# Patient Record
Sex: MALE | Race: BLACK OR AFRICAN AMERICAN | NOT HISPANIC OR LATINO | Employment: FULL TIME | URBAN - METROPOLITAN AREA
[De-identification: names, ages, dates, MRNs, and addresses within clinical notes are randomized per-mention and may not be internally consistent; named-entity substitution may affect disease eponyms.]

---

## 2017-05-10 ENCOUNTER — OFFICE VISIT (OUTPATIENT)
Dept: FAMILY MEDICINE CLINIC | Facility: CLINIC | Age: 32
End: 2017-05-10

## 2017-05-10 VITALS
WEIGHT: 315 LBS | HEIGHT: 72 IN | BODY MASS INDEX: 42.66 KG/M2 | TEMPERATURE: 98.4 F | SYSTOLIC BLOOD PRESSURE: 130 MMHG | OXYGEN SATURATION: 98 % | DIASTOLIC BLOOD PRESSURE: 64 MMHG | HEART RATE: 73 BPM

## 2017-05-10 DIAGNOSIS — E11.9 TYPE 2 DIABETES MELLITUS WITHOUT COMPLICATION, WITHOUT LONG-TERM CURRENT USE OF INSULIN (HCC): ICD-10-CM

## 2017-05-10 DIAGNOSIS — R10.13 EPIGASTRIC ABDOMINAL PAIN: ICD-10-CM

## 2017-05-10 DIAGNOSIS — L98.9 SKIN LESION: ICD-10-CM

## 2017-05-10 DIAGNOSIS — R10.11 RUQ ABDOMINAL PAIN: Primary | ICD-10-CM

## 2017-05-10 DIAGNOSIS — E78.5 DYSLIPIDEMIA: ICD-10-CM

## 2017-05-10 DIAGNOSIS — I10 BENIGN ESSENTIAL HYPERTENSION: ICD-10-CM

## 2017-05-10 PROCEDURE — 99214 OFFICE O/P EST MOD 30 MIN: CPT | Performed by: FAMILY MEDICINE

## 2017-05-10 RX ORDER — PANTOPRAZOLE SODIUM 40 MG/1
40 TABLET, DELAYED RELEASE ORAL DAILY
Qty: 90 TABLET | Refills: 3 | Status: SHIPPED | OUTPATIENT
Start: 2017-05-10 | End: 2017-11-10

## 2017-05-10 RX ORDER — HYDROCODONE BITARTRATE AND ACETAMINOPHEN 5; 325 MG/1; MG/1
1 TABLET ORAL EVERY 8 HOURS PRN
Qty: 24 TABLET | Refills: 0 | Status: SHIPPED | OUTPATIENT
Start: 2017-05-10 | End: 2017-07-17 | Stop reason: HOSPADM

## 2017-05-12 ENCOUNTER — APPOINTMENT (OUTPATIENT)
Dept: ULTRASOUND IMAGING | Facility: HOSPITAL | Age: 32
End: 2017-05-12
Attending: EMERGENCY MEDICINE

## 2017-05-12 ENCOUNTER — TELEPHONE (OUTPATIENT)
Dept: FAMILY MEDICINE CLINIC | Facility: CLINIC | Age: 32
End: 2017-05-12

## 2017-05-12 ENCOUNTER — APPOINTMENT (OUTPATIENT)
Dept: ULTRASOUND IMAGING | Facility: HOSPITAL | Age: 32
End: 2017-05-12

## 2017-05-12 ENCOUNTER — HOSPITAL ENCOUNTER (EMERGENCY)
Facility: HOSPITAL | Age: 32
Discharge: HOME | End: 2017-05-12
Attending: EMERGENCY MEDICINE | Admitting: EMERGENCY MEDICINE

## 2017-05-12 VITALS
OXYGEN SATURATION: 100 % | BODY MASS INDEX: 42.66 KG/M2 | RESPIRATION RATE: 20 BRPM | DIASTOLIC BLOOD PRESSURE: 67 MMHG | TEMPERATURE: 97 F | SYSTOLIC BLOOD PRESSURE: 133 MMHG | WEIGHT: 315 LBS | HEIGHT: 72 IN | HEART RATE: 86 BPM

## 2017-05-12 DIAGNOSIS — K29.00 ACUTE GASTRITIS WITHOUT HEMORRHAGE, UNSPECIFIED GASTRITIS TYPE: Primary | ICD-10-CM

## 2017-05-12 LAB
ALBUMIN SERPL-MCNC: 4.2 G/DL (ref 3.5–5.2)
ALBUMIN/GLOB SERPL: 1.4 G/DL
ALP SERPL-CCNC: 75 U/L (ref 39–117)
ALT SERPL W P-5'-P-CCNC: 31 U/L (ref 1–41)
ANION GAP SERPL CALCULATED.3IONS-SCNC: 13.4 MMOL/L
AST SERPL-CCNC: 25 U/L (ref 1–40)
BASOPHILS # BLD AUTO: 0.04 10*3/MM3 (ref 0–0.2)
BASOPHILS NFR BLD AUTO: 0.4 % (ref 0–1.5)
BILIRUB SERPL-MCNC: 0.4 MG/DL (ref 0.1–1.2)
BILIRUB UR QL STRIP: NEGATIVE
BUN BLD-MCNC: 11 MG/DL (ref 6–20)
BUN/CREAT SERPL: 9.6 (ref 7–25)
CALCIUM SPEC-SCNC: 9.3 MG/DL (ref 8.6–10.5)
CHLORIDE SERPL-SCNC: 99 MMOL/L (ref 98–107)
CLARITY UR: CLEAR
CO2 SERPL-SCNC: 25.6 MMOL/L (ref 22–29)
COLOR UR: YELLOW
CREAT BLD-MCNC: 1.14 MG/DL (ref 0.76–1.27)
DEPRECATED RDW RBC AUTO: 40.2 FL (ref 37–54)
EOSINOPHIL # BLD AUTO: 0.19 10*3/MM3 (ref 0–0.7)
EOSINOPHIL NFR BLD AUTO: 1.8 % (ref 0.3–6.2)
ERYTHROCYTE [DISTWIDTH] IN BLOOD BY AUTOMATED COUNT: 14.2 % (ref 11.5–14.5)
GFR SERPL CREATININE-BSD FRML MDRD: 91 ML/MIN/1.73
GLOBULIN UR ELPH-MCNC: 3.1 GM/DL
GLUCOSE BLD-MCNC: 106 MG/DL (ref 65–99)
GLUCOSE UR STRIP-MCNC: NEGATIVE MG/DL
HCT VFR BLD AUTO: 41.7 % (ref 40.4–52.2)
HGB BLD-MCNC: 13.7 G/DL (ref 13.7–17.6)
HGB UR QL STRIP.AUTO: NEGATIVE
HOLD SPECIMEN: NORMAL
HOLD SPECIMEN: NORMAL
IMM GRANULOCYTES # BLD: 0.02 10*3/MM3 (ref 0–0.03)
IMM GRANULOCYTES NFR BLD: 0.2 % (ref 0–0.5)
KETONES UR QL STRIP: NEGATIVE
LEUKOCYTE ESTERASE UR QL STRIP.AUTO: NEGATIVE
LIPASE SERPL-CCNC: 40 U/L (ref 13–60)
LYMPHOCYTES # BLD AUTO: 2.55 10*3/MM3 (ref 0.9–4.8)
LYMPHOCYTES NFR BLD AUTO: 24.6 % (ref 19.6–45.3)
MCH RBC QN AUTO: 25.8 PG (ref 27–32.7)
MCHC RBC AUTO-ENTMCNC: 32.9 G/DL (ref 32.6–36.4)
MCV RBC AUTO: 78.7 FL (ref 79.8–96.2)
MONOCYTES # BLD AUTO: 0.62 10*3/MM3 (ref 0.2–1.2)
MONOCYTES NFR BLD AUTO: 6 % (ref 5–12)
NEUTROPHILS # BLD AUTO: 6.95 10*3/MM3 (ref 1.9–8.1)
NEUTROPHILS NFR BLD AUTO: 67 % (ref 42.7–76)
NITRITE UR QL STRIP: NEGATIVE
PH UR STRIP.AUTO: 5.5 [PH] (ref 5–8)
PLATELET # BLD AUTO: 287 10*3/MM3 (ref 140–500)
PMV BLD AUTO: 9.8 FL (ref 6–12)
POTASSIUM BLD-SCNC: 3.7 MMOL/L (ref 3.5–5.2)
PROT SERPL-MCNC: 7.3 G/DL (ref 6–8.5)
PROT UR QL STRIP: NEGATIVE
RBC # BLD AUTO: 5.3 10*6/MM3 (ref 4.6–6)
SODIUM BLD-SCNC: 138 MMOL/L (ref 136–145)
SP GR UR STRIP: 1.03 (ref 1–1.03)
UROBILINOGEN UR QL STRIP: NORMAL
WBC NRBC COR # BLD: 10.37 10*3/MM3 (ref 4.5–10.7)
WHOLE BLOOD HOLD SPECIMEN: NORMAL
WHOLE BLOOD HOLD SPECIMEN: NORMAL

## 2017-05-12 PROCEDURE — 76705 ECHO EXAM OF ABDOMEN: CPT

## 2017-05-12 PROCEDURE — 81003 URINALYSIS AUTO W/O SCOPE: CPT | Performed by: EMERGENCY MEDICINE

## 2017-05-12 PROCEDURE — 99283 EMERGENCY DEPT VISIT LOW MDM: CPT

## 2017-05-12 PROCEDURE — 96374 THER/PROPH/DIAG INJ IV PUSH: CPT

## 2017-05-12 PROCEDURE — 85025 COMPLETE CBC W/AUTO DIFF WBC: CPT | Performed by: EMERGENCY MEDICINE

## 2017-05-12 PROCEDURE — 80053 COMPREHEN METABOLIC PANEL: CPT | Performed by: EMERGENCY MEDICINE

## 2017-05-12 PROCEDURE — 83690 ASSAY OF LIPASE: CPT | Performed by: EMERGENCY MEDICINE

## 2017-05-12 RX ORDER — SODIUM CHLORIDE 0.9 % (FLUSH) 0.9 %
10 SYRINGE (ML) INJECTION AS NEEDED
Status: DISCONTINUED | OUTPATIENT
Start: 2017-05-12 | End: 2017-05-12 | Stop reason: HOSPADM

## 2017-05-12 RX ORDER — PANTOPRAZOLE SODIUM 40 MG/10ML
80 INJECTION, POWDER, LYOPHILIZED, FOR SOLUTION INTRAVENOUS ONCE
Status: COMPLETED | OUTPATIENT
Start: 2017-05-12 | End: 2017-05-12

## 2017-05-12 RX ORDER — SUCRALFATE ORAL 1 G/10ML
1 SUSPENSION ORAL 4 TIMES DAILY
Qty: 420 ML | Refills: 0 | Status: SHIPPED | OUTPATIENT
Start: 2017-05-12 | End: 2017-07-17 | Stop reason: HOSPADM

## 2017-05-12 RX ORDER — LANSOPRAZOLE 15 MG/1
15 CAPSULE, DELAYED RELEASE ORAL 2 TIMES DAILY
Qty: 30 CAPSULE | Refills: 0 | Status: SHIPPED | OUTPATIENT
Start: 2017-05-12 | End: 2017-07-17 | Stop reason: HOSPADM

## 2017-05-12 RX ADMIN — PANTOPRAZOLE SODIUM 80 MG: 40 INJECTION, POWDER, FOR SOLUTION INTRAVENOUS at 07:34

## 2017-05-15 DIAGNOSIS — R10.13 EPIGASTRIC ABDOMINAL PAIN: ICD-10-CM

## 2017-05-15 DIAGNOSIS — R10.11 RUQ ABDOMINAL PAIN: Primary | ICD-10-CM

## 2017-05-16 ENCOUNTER — TELEPHONE (OUTPATIENT)
Dept: SOCIAL WORK | Facility: HOSPITAL | Age: 32
End: 2017-05-16

## 2017-07-17 ENCOUNTER — OFFICE VISIT (OUTPATIENT)
Dept: FAMILY MEDICINE CLINIC | Facility: CLINIC | Age: 32
End: 2017-07-17

## 2017-07-17 VITALS
WEIGHT: 315 LBS | OXYGEN SATURATION: 97 % | BODY MASS INDEX: 42.66 KG/M2 | HEIGHT: 72 IN | TEMPERATURE: 98 F | HEART RATE: 87 BPM | SYSTOLIC BLOOD PRESSURE: 118 MMHG | DIASTOLIC BLOOD PRESSURE: 60 MMHG

## 2017-07-17 DIAGNOSIS — J01.10 ACUTE NON-RECURRENT FRONTAL SINUSITIS: Primary | ICD-10-CM

## 2017-07-17 DIAGNOSIS — E78.5 DYSLIPIDEMIA: ICD-10-CM

## 2017-07-17 DIAGNOSIS — R73.03 PREDIABETES: ICD-10-CM

## 2017-07-17 DIAGNOSIS — I10 BENIGN ESSENTIAL HYPERTENSION: ICD-10-CM

## 2017-07-17 PROCEDURE — 99213 OFFICE O/P EST LOW 20 MIN: CPT | Performed by: FAMILY MEDICINE

## 2017-07-17 RX ORDER — AMOXICILLIN 875 MG/1
875 TABLET, COATED ORAL 2 TIMES DAILY
Qty: 20 TABLET | Refills: 0 | Status: SHIPPED | OUTPATIENT
Start: 2017-07-17 | End: 2017-11-10

## 2017-07-17 NOTE — PROGRESS NOTES
Subjective   Josiah Loomis is a 32 y.o. male. Presents today for   Chief Complaint   Patient presents with   • URI   • Cough     1 month       Cough   This is a new problem. Episode onset: 1 month. The problem has been unchanged. Episode frequency: intermittently. The cough is productive of sputum. Associated symptoms include chills, a fever (last week), headaches (after coughing sinus area), shortness of breath and wheezing. Pertinent negatives include no ear congestion, ear pain, heartburn, hemoptysis, nasal congestion, postnasal drip, rhinorrhea or sore throat. Nothing aggravates the symptoms. He has tried OTC cough suppressant for the symptoms. The treatment provided no relief. There is no history of environmental allergies.     Also right foot numb;  No pain;  No back pain;  Had predm 2 years ago with a1c 6.3%;  Has polyuria and polydipsia, though reports drinking 1 gallon water a day intentionally to lose weight.  Has lost several pounds.  Patient states going to gym, eating healthy;  Reports over all feels best has ever felt in general.  Hx of htn, bp okay w/o medications after weight loss.    Review of Systems   Constitutional: Positive for chills and fever (last week).   HENT: Positive for congestion and sinus pressure. Negative for ear pain, postnasal drip, rhinorrhea and sore throat.    Respiratory: Positive for cough, shortness of breath and wheezing. Negative for hemoptysis.    Gastrointestinal: Negative for abdominal pain, diarrhea, heartburn, nausea and vomiting.   Allergic/Immunologic: Negative for environmental allergies.   Neurological: Positive for headaches (after coughing sinus area).       The following portions of the patient's history were reviewed and updated as appropriate: allergies, current medications, past medical history and problem list.    Patient Active Problem List   Diagnosis   • Sprain of acromioclavicular ligament   • Atopic dermatitis   • Benign essential hypertension   •  "Dyslipidemia   • Abdominal hernia   • Prediabetes       No Known Allergies    Current Outpatient Prescriptions on File Prior to Visit   Medication Sig Dispense Refill   • pantoprazole (PROTONIX) 40 MG EC tablet Take 1 tablet by mouth Daily. 90 tablet 3   • [DISCONTINUED] atorvastatin (LIPITOR) 80 MG tablet Take 1 tablet by mouth daily. 30 tablet 11   • [DISCONTINUED] HYDROcodone-acetaminophen (NORCO) 5-325 MG per tablet Take 1 tablet by mouth Every 8 (Eight) Hours As Needed for Severe Pain (7-10). 24 tablet 0   • [DISCONTINUED] lansoprazole (PREVACID) 15 MG capsule Take 1 capsule by mouth 2 (Two) Times a Day. 30 capsule 0   • [DISCONTINUED] lisinopril (PRINIVIL,ZESTRIL) 20 MG tablet Take 1 tablet by mouth daily. 30 tablet 11   • [DISCONTINUED] metFORMIN (GLUCOPHAGE) 500 MG tablet Take 1 tablet by mouth daily with breakfast. 30 tablet 11   • [DISCONTINUED] sucralfate (CARAFATE) 1 GM/10ML suspension Take 10 mL by mouth 4 (Four) Times a Day. 420 mL 0     No current facility-administered medications on file prior to visit.        Objective   Vitals:    07/17/17 1317   BP: 118/60   BP Location: Left arm   Patient Position: Sitting   Cuff Size: Large Adult   Pulse: 87   Temp: 98 °F (36.7 °C)   TempSrc: Oral   SpO2: 97%   Weight: (!) 366 lb (166 kg)   Height: 72\" (182.9 cm)       Physical Exam   Constitutional: He appears well-developed and well-nourished.   HENT:   Head: Normocephalic and atraumatic.   Neck: Neck supple. No JVD present. No thyromegaly present.   Cardiovascular: Normal rate, regular rhythm and normal heart sounds.  Exam reveals no gallop and no friction rub.    No murmur heard.  Pulmonary/Chest: Effort normal and breath sounds normal. No respiratory distress. He has no wheezes. He has no rales.   Abdominal: Soft. Bowel sounds are normal. He exhibits no distension. There is no tenderness. There is no rebound and no guarding.   Musculoskeletal: He exhibits no edema.   Neurological: He is alert.   Skin: Skin " is warm and dry.   Psychiatric: He has a normal mood and affect. His behavior is normal.   Nursing note and vitals reviewed.      Assessment/Plan   Josiah was seen today for uri and cough.    Diagnoses and all orders for this visit:    Acute non-recurrent frontal sinusitis  -     amoxicillin (AMOXIL) 875 MG tablet; Take 1 tablet by mouth 2 (Two) Times a Day.    Prediabetes  -     Comprehensive Metabolic Panel  -     Hemoglobin A1c  -     Lipid Panel    Dyslipidemia  -     Comprehensive Metabolic Panel  -     Hemoglobin A1c  -     Lipid Panel    Benign essential hypertension  -     Comprehensive Metabolic Panel    -patient doing better overall, but overdue for labs;  Last ordered not done.  Right numb heel, no pain and non-bothersome, will monitor and screen for dm2.         -Follow up: 6 months       Current Outpatient Prescriptions:   •  pantoprazole (PROTONIX) 40 MG EC tablet, Take 1 tablet by mouth Daily., Disp: 90 tablet, Rfl: 3  •  amoxicillin (AMOXIL) 875 MG tablet, Take 1 tablet by mouth 2 (Two) Times a Day., Disp: 20 tablet, Rfl: 0

## 2017-07-18 LAB
ALBUMIN SERPL-MCNC: 3.8 G/DL (ref 3.5–5.5)
ALBUMIN/GLOB SERPL: 1.2 {RATIO} (ref 1.2–2.2)
ALP SERPL-CCNC: 148 IU/L (ref 39–117)
ALT SERPL-CCNC: 54 IU/L (ref 0–44)
AST SERPL-CCNC: 45 IU/L (ref 0–40)
BILIRUB SERPL-MCNC: 0.4 MG/DL (ref 0–1.2)
BUN SERPL-MCNC: 9 MG/DL (ref 6–20)
BUN/CREAT SERPL: 10 (ref 9–20)
CALCIUM SERPL-MCNC: 9.5 MG/DL (ref 8.7–10.2)
CHLORIDE SERPL-SCNC: 98 MMOL/L (ref 96–106)
CHOLEST SERPL-MCNC: 235 MG/DL (ref 100–199)
CO2 SERPL-SCNC: 25 MMOL/L (ref 18–29)
CREAT SERPL-MCNC: 0.92 MG/DL (ref 0.76–1.27)
GLOBULIN SER CALC-MCNC: 3.2 G/DL (ref 1.5–4.5)
GLUCOSE SERPL-MCNC: 133 MG/DL (ref 65–99)
HBA1C MFR BLD: 6.3 % (ref 4.8–5.6)
HDLC SERPL-MCNC: 45 MG/DL
LDLC SERPL CALC-MCNC: 166 MG/DL (ref 0–99)
POTASSIUM SERPL-SCNC: 3.9 MMOL/L (ref 3.5–5.2)
PROT SERPL-MCNC: 7 G/DL (ref 6–8.5)
SODIUM SERPL-SCNC: 139 MMOL/L (ref 134–144)
TRIGL SERPL-MCNC: 121 MG/DL (ref 0–149)
VLDLC SERPL CALC-MCNC: 24 MG/DL (ref 5–40)

## 2017-07-19 PROCEDURE — 85025 COMPLETE CBC W/AUTO DIFF WBC: CPT | Performed by: PHYSICIAN ASSISTANT

## 2017-07-19 PROCEDURE — 99283 EMERGENCY DEPT VISIT LOW MDM: CPT

## 2017-07-19 PROCEDURE — 80053 COMPREHEN METABOLIC PANEL: CPT | Performed by: PHYSICIAN ASSISTANT

## 2017-07-19 PROCEDURE — 36415 COLL VENOUS BLD VENIPUNCTURE: CPT

## 2017-07-19 NOTE — PROGRESS NOTES
Call and mail copy of results to patient.  Liver enzymes elevated mildly;  ORder RUQ US (GB) and acute hepatitis profile (dx elevated liver enzymes).  Blood sugar borderline, continue work on diet, exericse and weight loss.

## 2017-07-20 ENCOUNTER — HOSPITAL ENCOUNTER (EMERGENCY)
Facility: HOSPITAL | Age: 32
Discharge: HOME | End: 2017-07-20
Attending: EMERGENCY MEDICINE | Admitting: EMERGENCY MEDICINE

## 2017-07-20 VITALS
BODY MASS INDEX: 42.66 KG/M2 | HEART RATE: 85 BPM | WEIGHT: 315 LBS | RESPIRATION RATE: 14 BRPM | OXYGEN SATURATION: 100 % | TEMPERATURE: 99.6 F | HEIGHT: 72 IN | SYSTOLIC BLOOD PRESSURE: 132 MMHG | DIASTOLIC BLOOD PRESSURE: 63 MMHG

## 2017-07-20 DIAGNOSIS — R20.2 PARESTHESIA: ICD-10-CM

## 2017-07-20 DIAGNOSIS — M76.62 ACHILLES TENDINITIS OF LEFT LOWER EXTREMITY: Primary | ICD-10-CM

## 2017-07-20 LAB
ALBUMIN SERPL-MCNC: 3.9 G/DL (ref 3.5–5.2)
ALBUMIN/GLOB SERPL: 0.9 G/DL
ALP SERPL-CCNC: 141 U/L (ref 39–117)
ALT SERPL W P-5'-P-CCNC: 56 U/L (ref 1–41)
ANION GAP SERPL CALCULATED.3IONS-SCNC: 16.2 MMOL/L
AST SERPL-CCNC: 46 U/L (ref 1–40)
BASOPHILS # BLD AUTO: 0.04 10*3/MM3 (ref 0–0.2)
BASOPHILS NFR BLD AUTO: 0.4 % (ref 0–1.5)
BILIRUB SERPL-MCNC: 0.5 MG/DL (ref 0.1–1.2)
BUN BLD-MCNC: 12 MG/DL (ref 6–20)
BUN/CREAT SERPL: 9.6 (ref 7–25)
CALCIUM SPEC-SCNC: 9.8 MG/DL (ref 8.6–10.5)
CHLORIDE SERPL-SCNC: 98 MMOL/L (ref 98–107)
CO2 SERPL-SCNC: 23.8 MMOL/L (ref 22–29)
CREAT BLD-MCNC: 1.25 MG/DL (ref 0.76–1.27)
DEPRECATED RDW RBC AUTO: 40.1 FL (ref 37–54)
EOSINOPHIL # BLD AUTO: 0.31 10*3/MM3 (ref 0–0.7)
EOSINOPHIL NFR BLD AUTO: 3.2 % (ref 0.3–6.2)
ERYTHROCYTE [DISTWIDTH] IN BLOOD BY AUTOMATED COUNT: 14.2 % (ref 11.5–14.5)
GFR SERPL CREATININE-BSD FRML MDRD: 82 ML/MIN/1.73
GLOBULIN UR ELPH-MCNC: 4.2 GM/DL
GLUCOSE BLD-MCNC: 101 MG/DL (ref 65–99)
HCT VFR BLD AUTO: 41 % (ref 40.4–52.2)
HGB BLD-MCNC: 13.4 G/DL (ref 13.7–17.6)
IMM GRANULOCYTES # BLD: 0.03 10*3/MM3 (ref 0–0.03)
IMM GRANULOCYTES NFR BLD: 0.3 % (ref 0–0.5)
LYMPHOCYTES # BLD AUTO: 1.58 10*3/MM3 (ref 0.9–4.8)
LYMPHOCYTES NFR BLD AUTO: 16.3 % (ref 19.6–45.3)
MCH RBC QN AUTO: 25.7 PG (ref 27–32.7)
MCHC RBC AUTO-ENTMCNC: 32.7 G/DL (ref 32.6–36.4)
MCV RBC AUTO: 78.5 FL (ref 79.8–96.2)
MONOCYTES # BLD AUTO: 0.73 10*3/MM3 (ref 0.2–1.2)
MONOCYTES NFR BLD AUTO: 7.5 % (ref 5–12)
NEUTROPHILS # BLD AUTO: 7 10*3/MM3 (ref 1.9–8.1)
NEUTROPHILS NFR BLD AUTO: 72.3 % (ref 42.7–76)
PLATELET # BLD AUTO: 338 10*3/MM3 (ref 140–500)
PMV BLD AUTO: 9.7 FL (ref 6–12)
POTASSIUM BLD-SCNC: 3.8 MMOL/L (ref 3.5–5.2)
PROT SERPL-MCNC: 8.1 G/DL (ref 6–8.5)
RBC # BLD AUTO: 5.22 10*6/MM3 (ref 4.6–6)
SODIUM BLD-SCNC: 138 MMOL/L (ref 136–145)
WBC NRBC COR # BLD: 9.69 10*3/MM3 (ref 4.5–10.7)

## 2017-07-20 RX ORDER — NAPROXEN 500 MG/1
500 TABLET ORAL 2 TIMES DAILY PRN
Qty: 20 TABLET | Refills: 0 | Status: SHIPPED | OUTPATIENT
Start: 2017-07-20 | End: 2017-09-18

## 2017-07-20 NOTE — ED TRIAGE NOTES
"Pt c/o numbness in right heel that began this past Saturday but this morning when he went to get up from tying his shoe, he felt a severe \"pulling pain\" which is making it hard to walk. He also c/o pain in left achilles area that \"feels like its pulling and is tender to touch.   "

## 2017-07-20 NOTE — ED PROVIDER NOTES
" EMERGENCY DEPARTMENT ENCOUNTER    CHIEF COMPLAINT  Chief Complaint: left foot pain  History given by: patient  History limited by: nothing   Room Number: 16/16  PMD: Kodi Rosario DO      HPI:  Pt is a 32 y.o. male who presents complaining of left foot pain onset last night. Pt also complains of right foot numbness, but he denies right foot pain. He states he is on his feet a lot at work. Earlier, pt felt something \"pulling\" in his foot while bending over. He denies recent shoe change.    Onset: last night   Timing: constant   Location: feet   Radiation: does not radiate   Quality: new   Intensity/Severity: mild  Progression: unchanged   Associated Symptoms: right foot numbness   Aggravating Factors: none specified   Alleviating Factors: none specified   Previous Episodes: none specified   Treatment before arrival: none specified     PAST MEDICAL HISTORY  Active Ambulatory Problems     Diagnosis Date Noted   • Sprain of acromioclavicular ligament 05/26/2016   • Atopic dermatitis 05/26/2016   • Benign essential hypertension 05/26/2016   • Dyslipidemia 05/26/2016   • Abdominal hernia 05/26/2016   • Prediabetes 05/26/2016     Resolved Ambulatory Problems     Diagnosis Date Noted   • No Resolved Ambulatory Problems     Past Medical History:   Diagnosis Date   • GERD (gastroesophageal reflux disease)    • Hyperlipidemia    • Hypertension        PAST SURGICAL HISTORY  History reviewed. No pertinent surgical history.    FAMILY HISTORY  History reviewed. No pertinent family history.    SOCIAL HISTORY  Social History     Social History   • Marital status:      Spouse name: N/A   • Number of children: N/A   • Years of education: N/A     Occupational History   • Not on file.     Social History Main Topics   • Smoking status: Never Smoker   • Smokeless tobacco: Never Used   • Alcohol use Yes      Comment: occ   • Drug use: No   • Sexual activity: Defer     Other Topics Concern   • Not on file     Social History " Narrative       ALLERGIES  Review of patient's allergies indicates no known allergies.    REVIEW OF SYSTEMS  Review of Systems   Constitutional: Negative for chills and fever.   HENT: Negative for sore throat.    Respiratory: Negative for shortness of breath.    Cardiovascular: Negative for chest pain.   Gastrointestinal: Negative for nausea and vomiting.   Genitourinary: Negative for dysuria.   Musculoskeletal: Negative for back pain.        Left foot pain   Skin: Negative for rash.   Neurological: Positive for numbness (right foot). Negative for dizziness.   Psychiatric/Behavioral: The patient is not nervous/anxious.        PHYSICAL EXAM  ED Triage Vitals   Temp Heart Rate Resp BP SpO2   07/19/17 2233 07/19/17 2233 07/19/17 2233 07/19/17 2253 07/19/17 2233   99.6 °F (37.6 °C) 127 16 176/90 98 %      Temp src Heart Rate Source Patient Position BP Location FiO2 (%)   07/19/17 2233 07/19/17 2233 07/19/17 2253 07/19/17 2253 --   Tympanic Monitor Sitting Right arm        Physical Exam   Constitutional: He is oriented to person, place, and time and well-developed, well-nourished, and in no distress.   HENT:   Head: Normocephalic and atraumatic.   Eyes: EOM are normal. Pupils are equal, round, and reactive to light.   Neck: Normal range of motion. Neck supple.   Cardiovascular: Normal rate, regular rhythm and normal heart sounds.    Pulses:       Dorsalis pedis pulses are 2+ on the right side, and 2+ on the left side.   Pulmonary/Chest: Effort normal and breath sounds normal. No respiratory distress.   Abdominal: Soft. There is no tenderness. There is no rebound and no guarding.   Musculoskeletal: Normal range of motion. He exhibits no edema.        Left ankle: Achilles tendon exhibits pain.        Right foot: There is no tenderness and no swelling.        Left foot: There is normal range of motion.   Neurological: He is alert and oriented to person, place, and time. He has normal sensation and normal strength.   Skin:  Skin is warm and dry.   Psychiatric: Mood and affect normal.   Nursing note and vitals reviewed.      LAB RESULTS  Lab Results (last 24 hours)     Procedure Component Value Units Date/Time    CBC & Differential [[id number]] Collected:  07/19/17 2349    Specimen:  Blood Updated:  07/20/17 0027    Narrative:       The following orders were created for panel order CBC & Differential.  Procedure                               Abnormality         Status                     ---------                               -----------         ------                     CBC Auto Differential[[id number]]         Abnormal            Final result                 Please view results for these tests on the individual orders.    Comprehensive Metabolic Panel [[id number]]  (Abnormal) Collected:  07/19/17 2349    Specimen:  Blood Updated:  07/20/17 0057     Glucose 101 (H) mg/dL      BUN 12 mg/dL      Creatinine 1.25 mg/dL      Sodium 138 mmol/L      Potassium 3.8 mmol/L      Chloride 98 mmol/L      CO2 23.8 mmol/L      Calcium 9.8 mg/dL      Total Protein 8.1 g/dL      Albumin 3.90 g/dL      ALT (SGPT) 56 (H) U/L      AST (SGOT) 46 (H) U/L      Alkaline Phosphatase 141 (H) U/L      Total Bilirubin 0.5 mg/dL      eGFR   Amer 82 mL/min/1.73      Globulin 4.2 gm/dL      A/G Ratio 0.9 g/dL      BUN/Creatinine Ratio 9.6     Anion Gap 16.2 mmol/L     CBC Auto Differential [[id number]]  (Abnormal) Collected:  07/19/17 2349    Specimen:  Blood Updated:  07/20/17 0027     WBC 9.69 10*3/mm3      RBC 5.22 10*6/mm3      Hemoglobin 13.4 (L) g/dL      Hematocrit 41.0 %      MCV 78.5 (L) fL      MCH 25.7 (L) pg      MCHC 32.7 g/dL      RDW 14.2 %      RDW-SD 40.1 fl      MPV 9.7 fL      Platelets 338 10*3/mm3      Neutrophil % 72.3 %      Lymphocyte % 16.3 (L) %      Monocyte % 7.5 %      Eosinophil % 3.2 %      Basophil % 0.4 %      Immature Grans % 0.3 %      Neutrophils, Absolute 7.00 10*3/mm3      Lymphocytes, Absolute 1.58 10*3/mm3      Monocytes,  Absolute 0.73 10*3/mm3      Eosinophils, Absolute 0.31 10*3/mm3      Basophils, Absolute 0.04 10*3/mm3      Immature Grans, Absolute 0.03 10*3/mm3           I ordered the above labs and reviewed the results.    PROCEDURES  Procedures      PROGRESS AND CONSULTS  ED Course     0239: Discussed unremarkable labs and diagnosis of tendonitis. Pt will be discharged and treated symptomatically. Pt understands and agrees with plan for discharge and all questions were addressed.       MEDICAL DECISION MAKING  Results were reviewed/discussed with the patient and they were also made aware of online access. Pt also made aware that some labs, such as cultures, will not be resulted during ER visit and follow up with PMD is necessary.     MDM  Number of Diagnoses or Management Options     Amount and/or Complexity of Data Reviewed  Clinical lab tests: ordered and reviewed    Patient Progress  Patient progress: stable         DIAGNOSIS  Final diagnoses:   Achilles tendinitis of left lower extremity   Paresthesia of right heel       DISPOSITION  DISCHARGE    Patient discharged in stable condition.    Reviewed implications of results, diagnosis, meds, responsibility to follow up, warning signs and symptoms of possible worsening, potential complications and reasons to return to ER.  Patient/Family voiced understanding of above instructions.    Discussed plan for discharge, as there is no emergent indication for admission.  Pt/family is agreeable and understands need for follow up and repeat testing.  Pt is aware that discharge does not mean that nothing is wrong but it indicates no emergency is present that requires admission and they must continue care with follow-up as given below or physician of their choice.     FOLLOW-UP  Kodi Rosario DO  [address]  [address]  [place] [id number] [phone]    Call in 1 week  If symptoms persist         Medication List      New Prescriptions          naproxen 500 MG tablet   Commonly known  as:  NAPROSYN   Take 1 tablet by mouth 2 (Two) Times a Day As Needed for Mild Pain .             Latest Documented Vital Signs:  As of 2:49 AM  BP- 130/75 HR- 90 Temp- 99.6 °F (37.6 °C) (Tympanic) O2 sat- 98%    --  Documentation assistance provided by mirna Romano for Freddie Cochran.  Information recorded by the scribe was done at my direction and has been verified and validated by me.       Raina Romano  07/20/17 0250       Miguel Cochran MD  07/20/17 0402

## 2017-07-20 NOTE — DISCHARGE INSTRUCTIONS
Follow-up with your doctor next week if symptoms persist.  Apply ice to affected area as needed.  Take medication as prescribed.  Return to emergency department for worsening pain or other concern.

## 2017-07-21 ENCOUNTER — TELEPHONE (OUTPATIENT)
Dept: SOCIAL WORK | Facility: HOSPITAL | Age: 32
End: 2017-07-21

## 2017-07-31 DIAGNOSIS — R74.8 ELEVATED LIVER ENZYMES: Primary | ICD-10-CM

## 2017-08-01 ENCOUNTER — TELEPHONE (OUTPATIENT)
Dept: FAMILY MEDICINE CLINIC | Facility: CLINIC | Age: 32
End: 2017-08-01

## 2017-08-01 RX ORDER — AMOXICILLIN 500 MG/1
1000 CAPSULE ORAL 3 TIMES DAILY
Qty: 60 CAPSULE | Refills: 0 | Status: SHIPPED | OUTPATIENT
Start: 2017-08-01 | End: 2017-08-11

## 2017-08-01 RX ORDER — METHYLPREDNISOLONE 4 MG/1
TABLET ORAL
Qty: 21 TABLET | Refills: 0 | Status: SHIPPED | OUTPATIENT
Start: 2017-08-01 | End: 2017-09-18

## 2017-09-18 ENCOUNTER — OFFICE VISIT (OUTPATIENT)
Dept: FAMILY MEDICINE CLINIC | Facility: CLINIC | Age: 32
End: 2017-09-18

## 2017-09-18 VITALS
HEART RATE: 81 BPM | DIASTOLIC BLOOD PRESSURE: 80 MMHG | TEMPERATURE: 98.1 F | SYSTOLIC BLOOD PRESSURE: 142 MMHG | WEIGHT: 315 LBS | BODY MASS INDEX: 48.28 KG/M2 | OXYGEN SATURATION: 98 %

## 2017-09-18 DIAGNOSIS — H10.33 ACUTE CONJUNCTIVITIS OF BOTH EYES, UNSPECIFIED ACUTE CONJUNCTIVITIS TYPE: Primary | ICD-10-CM

## 2017-09-18 PROCEDURE — 99213 OFFICE O/P EST LOW 20 MIN: CPT | Performed by: FAMILY MEDICINE

## 2017-09-18 RX ORDER — TOBRAMYCIN 3 MG/ML
1 SOLUTION/ DROPS OPHTHALMIC 4 TIMES DAILY
Qty: 5 ML | Refills: 0 | Status: SHIPPED | OUTPATIENT
Start: 2017-09-18 | End: 2017-11-10

## 2017-09-18 NOTE — PROGRESS NOTES
Subjective   Josiah Loomis is a 32 y.o. male. Presents today for   Chief Complaint   Patient presents with   • Follow-up     had pink eye and was given ointment and would like a eye drop, ointment causing blurred vision.       Eye Problem    Both eyes are affected.This is a new problem. The current episode started 1 to 4 weeks ago. The problem occurs intermittently. The problem has been gradually improving. There was no injury mechanism. The pain is moderate. He does not wear contacts. Associated symptoms include blurred vision (from ointment), an eye discharge, eye redness and a recent URI. Pertinent negatives include no double vision, fever, foreign body sensation, nausea, photophobia or vomiting. Treatments tried: eye ointment, doesn't like as blurs vision at work. The treatment provided mild relief.       Review of Systems   Constitutional: Negative for chills and fever.   Eyes: Positive for blurred vision (from ointment), discharge and redness. Negative for double vision, photophobia and visual disturbance.   Gastrointestinal: Negative for nausea and vomiting.       The following portions of the patient's history were reviewed and updated as appropriate: allergies, current medications, past medical history and problem list.    Patient Active Problem List   Diagnosis   • Sprain of acromioclavicular ligament   • Atopic dermatitis   • Benign essential hypertension   • Dyslipidemia   • Abdominal hernia   • Prediabetes       No Known Allergies    Current Outpatient Prescriptions on File Prior to Visit   Medication Sig Dispense Refill   • amoxicillin (AMOXIL) 875 MG tablet Take 1 tablet by mouth 2 (Two) Times a Day. 20 tablet 0   • pantoprazole (PROTONIX) 40 MG EC tablet Take 1 tablet by mouth Daily. 90 tablet 3   • [DISCONTINUED] MethylPREDNISolone (MEDROL, MICHAEL,) 4 MG tablet Take as directed on package instructions. 21 tablet 0   • [DISCONTINUED] naproxen (NAPROSYN) 500 MG tablet Take 1 tablet by mouth 2 (Two) Times  a Day As Needed for Mild Pain . 20 tablet 0     No current facility-administered medications on file prior to visit.        Objective   Vitals:    09/18/17 0911   BP: 142/80   Pulse: 81   Temp: 98.1 °F (36.7 °C)   SpO2: 98%   Weight: (!) 356 lb (161 kg)       Physical Exam   Constitutional: He appears well-developed and well-nourished.   Eyes: EOM and lids are normal. Right conjunctiva is injected. Left conjunctiva is injected.   Neck: Neck supple.   Neurological: He is alert.   Skin: Skin is warm and dry.   Psychiatric: He has a normal mood and affect. His behavior is normal.   Nursing note and vitals reviewed.      Assessment/Plan   Josiah was seen today for follow-up.    Diagnoses and all orders for this visit:    Acute conjunctivitis of both eyes, unspecified acute conjunctivitis type  -     tobramycin 0.3 % solution ophthalmic solution; Administer 1 drop to both eyes 4 (Four) Times a Day.    -improving;  Ok change to drops         -Follow up: Prn - RTC if worse or no improvement.          Current Outpatient Prescriptions:   •  amoxicillin (AMOXIL) 875 MG tablet, Take 1 tablet by mouth 2 (Two) Times a Day., Disp: 20 tablet, Rfl: 0  •  pantoprazole (PROTONIX) 40 MG EC tablet, Take 1 tablet by mouth Daily., Disp: 90 tablet, Rfl: 3

## 2017-11-10 ENCOUNTER — OFFICE VISIT (OUTPATIENT)
Dept: FAMILY MEDICINE CLINIC | Facility: CLINIC | Age: 32
End: 2017-11-10

## 2017-11-10 VITALS
SYSTOLIC BLOOD PRESSURE: 138 MMHG | DIASTOLIC BLOOD PRESSURE: 78 MMHG | BODY MASS INDEX: 42.66 KG/M2 | WEIGHT: 315 LBS | OXYGEN SATURATION: 97 % | TEMPERATURE: 98.3 F | HEART RATE: 97 BPM | HEIGHT: 72 IN

## 2017-11-10 DIAGNOSIS — L02.31 CELLULITIS AND ABSCESS OF BUTTOCK: Primary | ICD-10-CM

## 2017-11-10 DIAGNOSIS — L03.317 CELLULITIS AND ABSCESS OF BUTTOCK: Primary | ICD-10-CM

## 2017-11-10 PROCEDURE — 96372 THER/PROPH/DIAG INJ SC/IM: CPT | Performed by: FAMILY MEDICINE

## 2017-11-10 PROCEDURE — 10061 I&D ABSCESS COMP/MULTIPLE: CPT | Performed by: FAMILY MEDICINE

## 2017-11-10 RX ORDER — SULFAMETHOXAZOLE AND TRIMETHOPRIM 800; 160 MG/1; MG/1
1 TABLET ORAL 2 TIMES DAILY
Qty: 20 TABLET | Refills: 0 | Status: SHIPPED | OUTPATIENT
Start: 2017-11-10 | End: 2017-11-16 | Stop reason: SDUPTHER

## 2017-11-10 RX ORDER — CEPHALEXIN 500 MG/1
500 CAPSULE ORAL 4 TIMES DAILY
Qty: 40 CAPSULE | Refills: 0 | Status: SHIPPED | OUTPATIENT
Start: 2017-11-10 | End: 2017-11-16 | Stop reason: SDUPTHER

## 2017-11-10 RX ORDER — IBUPROFEN 800 MG/1
800 TABLET ORAL EVERY 8 HOURS PRN
Qty: 45 TABLET | Refills: 0 | Status: SHIPPED | OUTPATIENT
Start: 2017-11-10 | End: 2018-04-01

## 2017-11-10 NOTE — PROGRESS NOTES
"Subjective   Josiah Loomis is a 32 y.o. male. Presents today for   Chief Complaint   Patient presents with   • Cyst     pt has painful cyst at top of tail bone. started hurting thursday of last week.   • Burn     pt has a blister on back from heating pad.       Cyst   This is a new problem. The current episode started in the past 7 days. The problem occurs constantly. The problem has been rapidly worsening. Associated symptoms include chills, myalgias and a rash. Pertinent negatives include no fever or nausea. Associated symptoms comments: Reports past 3-4 days developed growing \"cyst\" on gluteal crease.  Used heating pad as severe pain and hardness to gluteal crease and left buttock;  Warm to touch, painful to sit.  Thought may be burn himself.. Nothing aggravates the symptoms. He has tried heat and NSAIDs for the symptoms. The treatment provided mild relief.       Review of Systems   Constitutional: Positive for chills. Negative for fever.   Gastrointestinal: Negative for nausea.   Musculoskeletal: Positive for myalgias.   Skin: Positive for rash.       The following portions of the patient's history were reviewed and updated as appropriate: allergies, current medications, past medical history and problem list.    Patient Active Problem List   Diagnosis   • Sprain of acromioclavicular ligament   • Atopic dermatitis   • Benign essential hypertension   • Dyslipidemia   • Abdominal hernia   • Prediabetes       No Known Allergies    No current outpatient prescriptions on file prior to visit.     No current facility-administered medications on file prior to visit.        Objective   Vitals:    11/10/17 1433   BP: 138/78   BP Location: Left arm   Patient Position: Sitting   Cuff Size: Large Adult   Pulse: 97   Temp: 98.3 °F (36.8 °C)   TempSrc: Oral   SpO2: 97%   Weight: (!) 350 lb (159 kg)   Height: 72\" (182.9 cm)       Physical Exam   Constitutional: He appears well-developed and well-nourished.   HENT:   Head: " Normocephalic and atraumatic.   Neck: Neck supple. No JVD present. No thyromegaly present.   Cardiovascular: Normal rate, regular rhythm and normal heart sounds.  Exam reveals no gallop and no friction rub.    No murmur heard.  Pulmonary/Chest: Effort normal and breath sounds normal. No respiratory distress. He has no wheezes. He has no rales.   Abdominal: Soft. Bowel sounds are normal. He exhibits no distension. There is no tenderness. There is no rebound and no guarding.   Musculoskeletal: He exhibits no edema.   Neurological: He is alert.   Skin: Skin is warm and dry.        Psychiatric: He has a normal mood and affect. His behavior is normal.   Nursing note and vitals reviewed.  Incision & Drainage  Date/Time: 11/13/2017 8:29 AM  Performed by: GONZALEZ CHAUDHRY  Authorized by: GONZALEZ CHAUDHRY   Consent: Verbal consent obtained.  Risks and benefits: risks, benefits and alternatives were discussed  Consent given by: patient  Patient understanding: patient states understanding of the procedure being performed  Site marked: the operative site was marked  Required items: required blood products, implants, devices, and special equipment available  Patient identity confirmed: verbally with patient  Type: abscess  Body area: anogenital  Location details: gluteal cleft  Anesthesia: local infiltration    Anesthesia:  Local Anesthetic: lidocaine 2% without epinephrine  Anesthetic total: 10 mL    Sedation:  Patient sedated: no  Scalpel size: 11  Incision type: single straight  Drainage: purulent  Drainage amount: copious  Wound treatment: wound left open and  drain placed  Packing material: 1/4 in iodoform gauze (24 in pcked;  wound tracked 4-5 inches, debrided loculations with copious purulent drainage)  Patient tolerance: Patient tolerated the procedure well with no immediate complications      Ceftriaxone 1gm IM x1  Wound cx/s taken    Assessment/Plan   Josiah was seen today for cyst and burn.    Diagnoses and all  orders for this visit:    Cellulitis and abscess of buttock  -     Culture, Routine - Swab, Buttock  -     cefTRIAXone (ROCEPHIN) 1 g in lidocaine PF 1% (XYLOCAINE) IM only syringe; Inject 4 mL into the shoulder, thigh, or buttocks 1 (One) Time.  -     sulfamethoxazole-trimethoprim (BACTRIM DS,SEPTRA DS) 800-160 MG per tablet; Take 1 tablet by mouth 2 (Two) Times a Day.  -     cephalexin (KEFLEX) 500 MG capsule; Take 1 capsule by mouth 4 (Four) Times a Day.  -     ibuprofen (ADVIL,MOTRIN) 800 MG tablet; Take 1 tablet by mouth Every 8 (Eight) Hours As Needed for Mild Pain , Moderate Pain  or Fever.  -     Incision & Drainage    -I am working in  tomorrow directed to f/u there as needs daily recheck and packing.  I expressed importance of f/u and to go TO ER if worsening, red streaking or fevers and d/w him how serious the infection could become.  Currently outpatient treatment appropriate.  Left in stable condition and felt better.         -Follow up: 1 day       Current Outpatient Prescriptions:   •  cephalexin (KEFLEX) 500 MG capsule, Take 1 capsule by mouth 4 (Four) Times a Day., Disp: 40 capsule, Rfl: 0  •  ibuprofen (ADVIL,MOTRIN) 800 MG tablet, Take 1 tablet by mouth Every 8 (Eight) Hours As Needed for Mild Pain , Moderate Pain  or Fever., Disp: 45 tablet, Rfl: 0  •  sulfamethoxazole-trimethoprim (BACTRIM DS,SEPTRA DS) 800-160 MG per tablet, Take 1 tablet by mouth 2 (Two) Times a Day., Disp: 20 tablet, Rfl: 0

## 2017-11-16 ENCOUNTER — OFFICE VISIT (OUTPATIENT)
Dept: FAMILY MEDICINE CLINIC | Facility: CLINIC | Age: 32
End: 2017-11-16

## 2017-11-16 VITALS
WEIGHT: 315 LBS | DIASTOLIC BLOOD PRESSURE: 66 MMHG | BODY MASS INDEX: 48.28 KG/M2 | SYSTOLIC BLOOD PRESSURE: 126 MMHG | HEART RATE: 84 BPM | TEMPERATURE: 98.4 F | OXYGEN SATURATION: 98 %

## 2017-11-16 DIAGNOSIS — L03.317 CELLULITIS AND ABSCESS OF BUTTOCK: ICD-10-CM

## 2017-11-16 DIAGNOSIS — L02.31 ABSCESS AND CELLULITIS OF GLUTEAL REGION: Primary | ICD-10-CM

## 2017-11-16 DIAGNOSIS — L02.31 CELLULITIS AND ABSCESS OF BUTTOCK: ICD-10-CM

## 2017-11-16 DIAGNOSIS — L03.317 ABSCESS AND CELLULITIS OF GLUTEAL REGION: Primary | ICD-10-CM

## 2017-11-16 LAB
BACTERIA SPEC AEROBE CULT: ABNORMAL
BACTERIA SPEC CULT: ABNORMAL
BASOPHILS # BLD AUTO: 0.05 10*3/MM3 (ref 0–0.2)
BASOPHILS NFR BLD AUTO: 0.7 % (ref 0–1.5)
CRP SERPL-MCNC: 1.21 MG/DL (ref 0–0.5)
EOSINOPHIL # BLD AUTO: 0.32 10*3/MM3 (ref 0–0.7)
EOSINOPHIL NFR BLD AUTO: 4.2 % (ref 0.3–6.2)
ERYTHROCYTE [DISTWIDTH] IN BLOOD BY AUTOMATED COUNT: 16.1 % (ref 11.5–14.5)
ERYTHROCYTE [SEDIMENTATION RATE] IN BLOOD BY WESTERGREN METHOD: 20 MM/HR (ref 0–15)
HCT VFR BLD AUTO: 43.9 % (ref 40.4–52.2)
HGB BLD-MCNC: 13.5 G/DL (ref 13.7–17.6)
IMM GRANULOCYTES # BLD: 0.04 10*3/MM3 (ref 0–0.03)
IMM GRANULOCYTES NFR BLD: 0.5 % (ref 0–0.5)
LYMPHOCYTES # BLD AUTO: 1.6 10*3/MM3 (ref 0.9–4.8)
LYMPHOCYTES NFR BLD AUTO: 20.9 % (ref 19.6–45.3)
MCH RBC QN AUTO: 24.7 PG (ref 27–32.7)
MCHC RBC AUTO-ENTMCNC: 30.8 G/DL (ref 32.6–36.4)
MCV RBC AUTO: 80.4 FL (ref 79.8–96.2)
MONOCYTES # BLD AUTO: 0.64 10*3/MM3 (ref 0.2–1.2)
MONOCYTES NFR BLD AUTO: 8.3 % (ref 5–12)
NEUTROPHILS # BLD AUTO: 5.02 10*3/MM3 (ref 1.9–8.1)
NEUTROPHILS NFR BLD AUTO: 65.4 % (ref 42.7–76)
OTHER ANTIBIOTIC SUSC ISLT: ABNORMAL
PLATELET # BLD AUTO: 348 10*3/MM3 (ref 140–500)
RBC # BLD AUTO: 5.46 10*6/MM3 (ref 4.6–6)
WBC # BLD AUTO: 7.67 10*3/MM3 (ref 4.5–10.7)

## 2017-11-16 PROCEDURE — 99213 OFFICE O/P EST LOW 20 MIN: CPT | Performed by: FAMILY MEDICINE

## 2017-11-16 RX ORDER — CEPHALEXIN 500 MG/1
500 CAPSULE ORAL 4 TIMES DAILY
Qty: 40 CAPSULE | Refills: 0 | Status: SHIPPED | OUTPATIENT
Start: 2017-11-16 | End: 2018-04-01

## 2017-11-16 RX ORDER — SULFAMETHOXAZOLE AND TRIMETHOPRIM 800; 160 MG/1; MG/1
1 TABLET ORAL 2 TIMES DAILY
Qty: 20 TABLET | Refills: 0 | Status: SHIPPED | OUTPATIENT
Start: 2017-11-16 | End: 2018-04-01

## 2017-11-16 NOTE — PROGRESS NOTES
Subjective   Josiah Loomis is a 32 y.o. male. Presents today for   Chief Complaint   Patient presents with   • Follow-up     cyst on tailbone       Wound Check   He was originally treated 5 to 10 days ago. Previous treatment included IV/IM antibiotics, oral antibiotics, wound cleansing or irrigation and I&D of abscess. Maximum temperature: no fevers or chills. There has been colored (some purulent drainage) discharge from the wound. There is no redness (resolved) present. There is no swelling (resolved) present. There is no pain present.       Review of Systems   Constitutional: Negative for chills and fever.   Skin: Positive for wound. Negative for rash.       The following portions of the patient's history were reviewed and updated as appropriate: allergies, current medications, past medical history and problem list.    Patient Active Problem List   Diagnosis   • Sprain of acromioclavicular ligament   • Atopic dermatitis   • Benign essential hypertension   • Dyslipidemia   • Abdominal hernia   • Prediabetes       No Known Allergies    Current Outpatient Prescriptions on File Prior to Visit   Medication Sig Dispense Refill   • cephalexin (KEFLEX) 500 MG capsule Take 1 capsule by mouth 4 (Four) Times a Day. 40 capsule 0   • ibuprofen (ADVIL,MOTRIN) 800 MG tablet Take 1 tablet by mouth Every 8 (Eight) Hours As Needed for Mild Pain , Moderate Pain  or Fever. 45 tablet 0   • sulfamethoxazole-trimethoprim (BACTRIM DS,SEPTRA DS) 800-160 MG per tablet Take 1 tablet by mouth 2 (Two) Times a Day. 20 tablet 0     No current facility-administered medications on file prior to visit.        Objective   Vitals:    11/16/17 0736   BP: 126/66   Pulse: 84   Temp: 98.4 °F (36.9 °C)   SpO2: 98%   Weight: (!) 356 lb (161 kg)       Physical Exam   Constitutional: He appears well-developed and well-nourished.   Neck: Neck supple.   Neurological: He is alert.   Skin: Skin is warm and dry.   Indurated area of buttock resolved, small amt  purulence from I&D site, iodoform gauze down to 1 inch in wound.  No redness or warmth; no tenderness to touch.  Cleansed with betadine and covered with sterile gauze.   Psychiatric: He has a normal mood and affect. His behavior is normal.   Nursing note and vitals reviewed.      Assessment/Plan   Josiah was seen today for follow-up.    Diagnoses and all orders for this visit:    Abscess and cellulitis of gluteal region  -     cephalexin (KEFLEX) 500 MG capsule; Take 1 capsule by mouth 4 (Four) Times a Day.  -     sulfamethoxazole-trimethoprim (BACTRIM DS,SEPTRA DS) 800-160 MG per tablet; Take 1 tablet by mouth 2 (Two) Times a Day.  -     CBC & Differential  -     Sedimentation Rate  -     C-reactive Protein    Cellulitis and abscess of buttock  -     cephalexin (KEFLEX) 500 MG capsule; Take 1 capsule by mouth 4 (Four) Times a Day.  -     sulfamethoxazole-trimethoprim (BACTRIM DS,SEPTRA DS) 800-160 MG per tablet; Take 1 tablet by mouth 2 (Two) Times a Day.  -     CBC & Differential  -     Sedimentation Rate  -     C-reactive Protein    -will extend abx, keep covered sterile gauze, to return if reforming of abscess; Has greatly improved;  Will recheck CBC, sed rate and crp as some scanty purulence today.           -Follow up: 2 weeks       Current Outpatient Prescriptions:   •  cephalexin (KEFLEX) 500 MG capsule, Take 1 capsule by mouth 4 (Four) Times a Day., Disp: 40 capsule, Rfl: 0  •  ibuprofen (ADVIL,MOTRIN) 800 MG tablet, Take 1 tablet by mouth Every 8 (Eight) Hours As Needed for Mild Pain , Moderate Pain  or Fever., Disp: 45 tablet, Rfl: 0  •  sulfamethoxazole-trimethoprim (BACTRIM DS,SEPTRA DS) 800-160 MG per tablet, Take 1 tablet by mouth 2 (Two) Times a Day., Disp: 20 tablet, Rfl: 0

## 2018-03-31 ENCOUNTER — APPOINTMENT (OUTPATIENT)
Dept: CT IMAGING | Facility: HOSPITAL | Age: 33
End: 2018-03-31

## 2018-03-31 ENCOUNTER — HOSPITAL ENCOUNTER (EMERGENCY)
Facility: HOSPITAL | Age: 33
Discharge: HOME | End: 2018-04-01
Attending: EMERGENCY MEDICINE | Admitting: EMERGENCY MEDICINE

## 2018-03-31 DIAGNOSIS — H20.9 UVEITIS: Primary | ICD-10-CM

## 2018-03-31 LAB
ALBUMIN SERPL-MCNC: 3.8 G/DL (ref 3.5–5.2)
ALBUMIN/GLOB SERPL: 0.9 G/DL
ALP SERPL-CCNC: 297 U/L (ref 39–117)
ALT SERPL W P-5'-P-CCNC: 56 U/L (ref 1–41)
ANION GAP SERPL CALCULATED.3IONS-SCNC: 12.4 MMOL/L
AST SERPL-CCNC: 50 U/L (ref 1–40)
BASOPHILS # BLD AUTO: 0.04 10*3/MM3 (ref 0–0.2)
BASOPHILS NFR BLD AUTO: 0.5 % (ref 0–1.5)
BILIRUB SERPL-MCNC: 0.5 MG/DL (ref 0.1–1.2)
BUN BLD-MCNC: 8 MG/DL (ref 6–20)
BUN/CREAT SERPL: 10.3 (ref 7–25)
CALCIUM SPEC-SCNC: 9.5 MG/DL (ref 8.6–10.5)
CHLORIDE SERPL-SCNC: 99 MMOL/L (ref 98–107)
CO2 SERPL-SCNC: 25.6 MMOL/L (ref 22–29)
CREAT BLD-MCNC: 0.78 MG/DL (ref 0.76–1.27)
D-LACTATE SERPL-SCNC: 1.1 MMOL/L (ref 0.5–2)
DEPRECATED RDW RBC AUTO: 40.7 FL (ref 37–54)
EOSINOPHIL # BLD AUTO: 0.33 10*3/MM3 (ref 0–0.7)
EOSINOPHIL NFR BLD AUTO: 4.2 % (ref 0.3–6.2)
ERYTHROCYTE [DISTWIDTH] IN BLOOD BY AUTOMATED COUNT: 14.4 % (ref 11.5–14.5)
FLUAV AG NPH QL: NEGATIVE
FLUBV AG NPH QL IA: NEGATIVE
GFR SERPL CREATININE-BSD FRML MDRD: 141 ML/MIN/1.73
GLOBULIN UR ELPH-MCNC: 4.2 GM/DL
GLUCOSE BLD-MCNC: 146 MG/DL (ref 65–99)
HCT VFR BLD AUTO: 45.8 % (ref 40.4–52.2)
HGB BLD-MCNC: 14.8 G/DL (ref 13.7–17.6)
HOLD SPECIMEN: NORMAL
HOLD SPECIMEN: NORMAL
IMM GRANULOCYTES # BLD: 0.02 10*3/MM3 (ref 0–0.03)
IMM GRANULOCYTES NFR BLD: 0.3 % (ref 0–0.5)
LYMPHOCYTES # BLD AUTO: 1.35 10*3/MM3 (ref 0.9–4.8)
LYMPHOCYTES NFR BLD AUTO: 17.3 % (ref 19.6–45.3)
MCH RBC QN AUTO: 25.5 PG (ref 27–32.7)
MCHC RBC AUTO-ENTMCNC: 32.3 G/DL (ref 32.6–36.4)
MCV RBC AUTO: 79 FL (ref 79.8–96.2)
MONOCYTES # BLD AUTO: 0.71 10*3/MM3 (ref 0.2–1.2)
MONOCYTES NFR BLD AUTO: 9.1 % (ref 5–12)
NEUTROPHILS # BLD AUTO: 5.37 10*3/MM3 (ref 1.9–8.1)
NEUTROPHILS NFR BLD AUTO: 68.6 % (ref 42.7–76)
PLATELET # BLD AUTO: 277 10*3/MM3 (ref 140–500)
PMV BLD AUTO: 9.6 FL (ref 6–12)
POTASSIUM BLD-SCNC: 4 MMOL/L (ref 3.5–5.2)
PROT SERPL-MCNC: 8 G/DL (ref 6–8.5)
RBC # BLD AUTO: 5.8 10*6/MM3 (ref 4.6–6)
SODIUM BLD-SCNC: 137 MMOL/L (ref 136–145)
WBC NRBC COR # BLD: 7.82 10*3/MM3 (ref 4.5–10.7)
WHOLE BLOOD HOLD SPECIMEN: NORMAL
WHOLE BLOOD HOLD SPECIMEN: NORMAL

## 2018-03-31 PROCEDURE — 99284 EMERGENCY DEPT VISIT MOD MDM: CPT

## 2018-03-31 PROCEDURE — 80053 COMPREHEN METABOLIC PANEL: CPT | Performed by: PHYSICIAN ASSISTANT

## 2018-03-31 PROCEDURE — 87040 BLOOD CULTURE FOR BACTERIA: CPT | Performed by: PHYSICIAN ASSISTANT

## 2018-03-31 PROCEDURE — 83605 ASSAY OF LACTIC ACID: CPT | Performed by: PHYSICIAN ASSISTANT

## 2018-03-31 PROCEDURE — 70450 CT HEAD/BRAIN W/O DYE: CPT

## 2018-03-31 PROCEDURE — 85025 COMPLETE CBC W/AUTO DIFF WBC: CPT | Performed by: PHYSICIAN ASSISTANT

## 2018-03-31 PROCEDURE — 36415 COLL VENOUS BLD VENIPUNCTURE: CPT

## 2018-03-31 PROCEDURE — 87804 INFLUENZA ASSAY W/OPTIC: CPT | Performed by: PHYSICIAN ASSISTANT

## 2018-04-01 VITALS
DIASTOLIC BLOOD PRESSURE: 78 MMHG | RESPIRATION RATE: 16 BRPM | HEIGHT: 72 IN | BODY MASS INDEX: 42.66 KG/M2 | TEMPERATURE: 99 F | OXYGEN SATURATION: 96 % | SYSTOLIC BLOOD PRESSURE: 139 MMHG | HEART RATE: 98 BPM | WEIGHT: 315 LBS

## 2018-04-01 LAB — CHROMATIN AB SERPL-ACNC: <10 IU/ML (ref 0–14)

## 2018-04-01 PROCEDURE — 86592 SYPHILIS TEST NON-TREP QUAL: CPT | Performed by: EMERGENCY MEDICINE

## 2018-04-01 PROCEDURE — 86431 RHEUMATOID FACTOR QUANT: CPT | Performed by: EMERGENCY MEDICINE

## 2018-04-01 PROCEDURE — 86038 ANTINUCLEAR ANTIBODIES: CPT | Performed by: EMERGENCY MEDICINE

## 2018-04-01 PROCEDURE — 96374 THER/PROPH/DIAG INJ IV PUSH: CPT

## 2018-04-01 PROCEDURE — 96375 TX/PRO/DX INJ NEW DRUG ADDON: CPT

## 2018-04-01 PROCEDURE — 25010000002 KETOROLAC TROMETHAMINE PER 15 MG: Performed by: EMERGENCY MEDICINE

## 2018-04-01 PROCEDURE — 25010000002 DIPHENHYDRAMINE PER 50 MG: Performed by: EMERGENCY MEDICINE

## 2018-04-01 RX ORDER — HYDROCODONE BITARTRATE AND ACETAMINOPHEN 5; 325 MG/1; MG/1
1 TABLET ORAL EVERY 6 HOURS PRN
Qty: 10 TABLET | Refills: 0 | OUTPATIENT
Start: 2018-04-01 | End: 2019-04-07

## 2018-04-01 RX ORDER — TETRACAINE HYDROCHLORIDE 5 MG/ML
2 SOLUTION OPHTHALMIC ONCE
Status: DISCONTINUED | OUTPATIENT
Start: 2018-04-01 | End: 2018-04-01 | Stop reason: HOSPADM

## 2018-04-01 RX ORDER — KETOROLAC TROMETHAMINE 30 MG/ML
30 INJECTION, SOLUTION INTRAMUSCULAR; INTRAVENOUS ONCE
Status: COMPLETED | OUTPATIENT
Start: 2018-04-01 | End: 2018-04-01

## 2018-04-01 RX ORDER — DIPHENHYDRAMINE HYDROCHLORIDE 50 MG/ML
25 INJECTION INTRAMUSCULAR; INTRAVENOUS ONCE
Status: COMPLETED | OUTPATIENT
Start: 2018-04-01 | End: 2018-04-01

## 2018-04-01 RX ADMIN — KETOROLAC TROMETHAMINE 30 MG: 30 INJECTION, SOLUTION INTRAMUSCULAR at 01:37

## 2018-04-01 RX ADMIN — DIPHENHYDRAMINE HYDROCHLORIDE 25 MG: 50 INJECTION, SOLUTION INTRAMUSCULAR; INTRAVENOUS at 01:37

## 2018-04-01 NOTE — ED TRIAGE NOTES
C/o headache/rt eye pain that started Wednesday with blurred vision.  +photophobia severe today. Denies nausea/vomiting.  Fever today.

## 2018-04-01 NOTE — ED NOTES
Pt c/o HA (behind right eye), blurred vision and sensitivity to light x 2 days, worse today. Pt had no relief with Ibuprofen. No history of migraines. Denies any neck pain. No nausea/vomiting.     Phyllis Burgess RN  03/31/18 2150          Phyllis Burgess RN  03/31/18 2151

## 2018-04-01 NOTE — ED PROVIDER NOTES
EMERGENCY DEPARTMENT ENCOUNTER    CHIEF COMPLAINT  Chief Complaint: Headache  History given by: Pt  History limited by: Nothing  Room Number: 18/18  PMD: Kodi Rosario DO      HPI:  Pt is a 33 y.o. male who presents with throbbing and stabbing headache that began 2-3 days ago, and begins at his R eye and radiates backwards. The pt confirms blurry vision even with his glasses, photophobia, fever, and sinus pressure and denies neck pain, chest pain, SOA, N/V. The pt states that this has happened in the past but normally goes away, and it was due more to lack of sleep.     Duration - 2-3 days  Onset - gradual  Timing - persistent  Location of Headache - right-sided unilateral  Radiation - with radiation to right side of head  Description of Headache - throbbing pain  Intensity/Severity - moderate  Progression - worse  Associated Symptoms - photophobia, blurred vision, fever  Aura -Blurred vision  Aggravating Factors- No increased intracranial pressure symptoms  Alleviating Factors - unable to obtain relief with OTC meds  History of Headaches- The pt has had previous headache like this caused by lack of sleep  Treatment Prior to Arrival - Unknown    PAST MEDICAL HISTORY  Active Ambulatory Problems     Diagnosis Date Noted   • Sprain of acromioclavicular ligament 05/26/2016   • Atopic dermatitis 05/26/2016   • Benign essential hypertension 05/26/2016   • Dyslipidemia 05/26/2016   • Abdominal hernia 05/26/2016   • Prediabetes 05/26/2016     Resolved Ambulatory Problems     Diagnosis Date Noted   • No Resolved Ambulatory Problems     Past Medical History:   Diagnosis Date   • GERD (gastroesophageal reflux disease)    • Hyperlipidemia    • Hypertension        PAST SURGICAL HISTORY  History reviewed. No pertinent surgical history.    FAMILY HISTORY  History reviewed. No pertinent family history.    SOCIAL HISTORY  Social History     Social History   • Marital status:      Spouse name: N/A   • Number of children:  N/A   • Years of education: N/A     Occupational History   • Not on file.     Social History Main Topics   • Smoking status: Never Smoker   • Smokeless tobacco: Never Used   • Alcohol use Yes      Comment: rarely   • Drug use: No   • Sexual activity: Defer     Other Topics Concern   • Not on file     Social History Narrative   • No narrative on file       ALLERGIES  Review of patient's allergies indicates no known allergies.    REVIEW OF SYSTEMS  Review of Systems    PHYSICAL EXAM  ED Triage Vitals   Temp Heart Rate Resp BP SpO2   03/31/18 2151 03/31/18 2151 03/31/18 2151 03/31/18 2211 03/31/18 2151   (!) 101 °F (38.3 °C) 114 18 138/96 95 %      Temp src Heart Rate Source Patient Position BP Location FiO2 (%)   03/31/18 2151 03/31/18 2151 -- -- --   Tympanic Monitor          Physical Exam   Constitutional: He is oriented to person, place, and time and well-developed, well-nourished, and in no distress. He appears distressed.   HENT:   Head: Normocephalic and atraumatic.   Eyes: EOM are normal. Pupils are equal, round, and reactive to light. Right conjunctiva is injected. Left conjunctiva is injected.   Photophobia   Neck: Normal range of motion and full passive range of motion without pain. Neck supple. No no neck rigidity.   Cardiovascular: Normal rate, regular rhythm and normal heart sounds.    Pulmonary/Chest: Effort normal and breath sounds normal. No respiratory distress.   Abdominal: Soft. There is no tenderness. There is no rebound and no guarding.   Musculoskeletal: Normal range of motion. He exhibits no edema.   Neurological: He is alert and oriented to person, place, and time. He has normal sensation and normal strength.   No focal neuro defecits   Skin: Skin is warm and dry.   Psychiatric: Mood and affect normal.   Nursing note and vitals reviewed.      LAB RESULTS  Lab Results (last 24 hours)     Procedure Component Value Units Date/Time    Influenza Antigen, Rapid - Swab, Nasopharynx [[id number]]   (Normal) Collected:  03/31/18 2213    Specimen:  Swab from Nasopharynx Updated:  03/31/18 2234     Influenza A Ag, EIA Negative     Influenza B Ag, EIA Negative    CBC & Differential [[id number]] Collected:  03/31/18 2238    Specimen:  Blood Updated:  03/31/18 2252    Narrative:       The following orders were created for panel order CBC & Differential.  Procedure                               Abnormality         Status                     ---------                               -----------         ------                     CBC Auto Differential[[id number]]        Abnormal            Final result                 Please view results for these tests on the individual orders.    Comprehensive Metabolic Panel [[id number]]  (Abnormal) Collected:  03/31/18 2238    Specimen:  Blood from Arm, Left Updated:  03/31/18 2313     Glucose 146 (H) mg/dL      BUN 8 mg/dL      Creatinine 0.78 mg/dL      Sodium 137 mmol/L      Potassium 4.0 mmol/L      Chloride 99 mmol/L      CO2 25.6 mmol/L      Calcium 9.5 mg/dL      Total Protein 8.0 g/dL      Albumin 3.80 g/dL      ALT (SGPT) 56 (H) U/L      AST (SGOT) 50 (H) U/L      Alkaline Phosphatase 297 (H) U/L      Total Bilirubin 0.5 mg/dL      eGFR   Amer 141 mL/min/1.73      Globulin 4.2 gm/dL      A/G Ratio 0.9 g/dL      BUN/Creatinine Ratio 10.3     Anion Gap 12.4 mmol/L     Lactic Acid, Plasma [[id number]]  (Normal) Collected:  03/31/18 2238    Specimen:  Blood from Arm, Left Updated:  03/31/18 2304     Lactate 1.1 mmol/L     Blood Culture - Blood, [[id number]] Collected:  03/31/18 2238    Specimen:  Blood from Arm, Right Updated:  03/31/18 2243    Blood Culture - Blood, [[id number]] Collected:  03/31/18 2238    Specimen:  Blood from Arm, Left Updated:  03/31/18 2243    CBC Auto Differential [[id number]]  (Abnormal) Collected:  03/31/18 2238    Specimen:  Blood from Arm, Left Updated:  03/31/18 2252     WBC 7.82 10*3/mm3      RBC 5.80 10*6/mm3      Hemoglobin 14.8 g/dL       Hematocrit 45.8 %      MCV 79.0 (L) fL      MCH 25.5 (L) pg      MCHC 32.3 (L) g/dL      RDW 14.4 %      RDW-SD 40.7 fl      MPV 9.6 fL      Platelets 277 10*3/mm3      Neutrophil % 68.6 %      Lymphocyte % 17.3 (L) %      Monocyte % 9.1 %      Eosinophil % 4.2 %      Basophil % 0.5 %      Immature Grans % 0.3 %      Neutrophils, Absolute 5.37 10*3/mm3      Lymphocytes, Absolute 1.35 10*3/mm3      Monocytes, Absolute 0.71 10*3/mm3      Eosinophils, Absolute 0.33 10*3/mm3      Basophils, Absolute 0.04 10*3/mm3      Immature Grans, Absolute 0.02 10*3/mm3     Antinuclear Antibody With Reflex Cascade [[id number]] Collected:  04/01/18 0141    Specimen:  Blood Updated:  04/01/18 0148    Rheumatoid Factor [[id number]] Collected:  04/01/18 0141    Specimen:  Blood Updated:  04/01/18 0148    RPR [[id number]] Collected:  04/01/18 0141    Specimen:  Blood Updated:  04/01/18 0148          I ordered the above labs and reviewed the results    RADIOLOGY  CT Head Without Contrast   Preliminary Result   No definite acute intracranial pathology.                           I ordered the above noted radiological studies. Interpreted by radiologist. Reviewed by me in PACS.       PROCEDURES  Procedures      PROGRESS AND CONSULTS  ED Course   2338 Ordered CT head for further evaluation. Ordered tetracaine for the pt's eye exam.     0024 Rechecked the patient and he is resting, and still has eye and head pain. Discussed pertinent lab and imaging results, including negative head CT and unremarkable labs. Discussed the plan to check the pressure in the pt's eye, which had 2 normal readings. Discussed that the eye pain could be caused by uveitis, and that the pt should see an eye doctor for treatment. Patient agrees with plan and all questions were addressed.   Eye pressure: 17, 15 IOP    0032 Ordered fluorescein for eye exam.    0100 Performed eye exam, which was negative for corneal uptake.     0109 Placed call to ophthalmology for consult  without answer.     0115 Discussed the pt with Dr. Joshi (ophthalmology) who states that I should order syphilis and ABBEY workup for further evaluation. Also stated that the pt can be seen in office on Monday.     0119 Ordered rheumatoid factor, RPR, and ABBEY for further evaluation.     0120 Ordered toradol for pain and benadryl.    0122 Discussed the consultation with the ophthalmologist, and the plan to discharge the pt and for him to call the ophthalmologist tomorrow for an appointment. Stated that the pt will also be given pain medication for the pt's headache. The pt and girlfriend agree with the plan and all questions were addressed.     0205 Rechecked the pt who states that his headache has improved. Discussed the plan to discharge the pt with a prescription for his headache pain. The pt and  agree with the plan and all questions were addressed.     MEDICAL DECISION MAKING  Results were reviewed/discussed with the patient and they were also made aware of online access. Pt also made aware that some labs, such as cultures, will not be resulted during ER visit and follow up with PMD is necessary.     MDM  Number of Diagnoses or Management Options     Amount and/or Complexity of Data Reviewed  Clinical lab tests: ordered and reviewed (Unremarkable labs)  Tests in the radiology section of CPT®: ordered and reviewed (CT head: No acute intracranial pathology)  Decide to obtain previous medical records or to obtain history from someone other than the patient: yes  Review and summarize past medical records: yes  Discuss the patient with other providers: yes (Dr. Joshi (ophthalmology) )    Patient Progress  Patient progress: stable         DIAGNOSIS  Final diagnoses:   Uveitis       DISPOSITION  Disposition: Discharged.    Patient discharged in stable condition.    Reviewed implications of results, diagnosis, meds, responsibility to follow up, warning signs and symptoms of possible worsening,  potential complications and reasons to return to ER, including new or worsening symptoms.    Patient/Family voiced understanding of above instructions.    Discussed plan for discharge, as there is no emergent indication for admission.  Pt/family is agreeable and understands need for follow up and repeat testing.  Pt is aware that discharge does not mean that nothing is wrong but it indicates no emergency is present and they must continue care with follow-up as given below or physician of their choice.     FOLLOW-UP  Magda Joshi MD  [address] [place] [id number] [phone]      follow up on Monday         Medication List      New Prescriptions    HYDROcodone-acetaminophen 5-325 MG per tablet  Commonly known as:  NORCO  Take 1 tablet by mouth Every 6 (Six) Hours As Needed for Moderate Pain .        Stop    cephalexin 500 MG capsule  Commonly known as:  KEFLEX     ibuprofen 800 MG tablet  Commonly known as:  ADVIL,MOTRIN     sulfamethoxazole-trimethoprim 800-160 MG per tablet  Commonly known as:  BACTRIM DS,SEPTRA DS            Latest Documented Vital Signs:  As of 2:06 AM  BP- 138/96 HR- 110 Temp- 99.8 °F (37.7 °C) (Oral) O2 sat- 95%    --  Documentation assistance provided by mirna Ramires for Dr. Soler.  Information recorded by the scribe was done at my direction and has been verified and validated by me.         Madiha Ramires  04/01/18 0113       Madiha Ramires  04/01/18 0133       Madiha Ramires  04/01/18 0206       Reid Soler MD  04/01/18 5292

## 2018-04-02 LAB — RPR SER QL: NORMAL

## 2018-04-03 ENCOUNTER — TELEPHONE (OUTPATIENT)
Dept: SOCIAL WORK | Facility: HOSPITAL | Age: 33
End: 2018-04-03

## 2018-04-03 LAB
ANA SER QL: NEGATIVE
Lab: NORMAL

## 2018-04-05 LAB
BACTERIA SPEC AEROBE CULT: NORMAL
BACTERIA SPEC AEROBE CULT: NORMAL

## 2018-11-16 ENCOUNTER — OFFICE VISIT (OUTPATIENT)
Dept: FAMILY MEDICINE CLINIC | Facility: CLINIC | Age: 33
End: 2018-11-16

## 2018-11-16 VITALS
BODY MASS INDEX: 45.1 KG/M2 | DIASTOLIC BLOOD PRESSURE: 72 MMHG | OXYGEN SATURATION: 97 % | HEIGHT: 70 IN | SYSTOLIC BLOOD PRESSURE: 140 MMHG | HEART RATE: 79 BPM | WEIGHT: 315 LBS

## 2018-11-16 DIAGNOSIS — E78.5 DYSLIPIDEMIA: Primary | ICD-10-CM

## 2018-11-16 DIAGNOSIS — Z20.2 STD EXPOSURE: ICD-10-CM

## 2018-11-16 DIAGNOSIS — R73.03 PREDIABETES: ICD-10-CM

## 2018-11-16 DIAGNOSIS — E66.01 MORBIDLY OBESE (HCC): ICD-10-CM

## 2018-11-16 PROCEDURE — 99213 OFFICE O/P EST LOW 20 MIN: CPT | Performed by: FAMILY MEDICINE

## 2018-11-16 RX ORDER — METRONIDAZOLE 500 MG/1
500 TABLET ORAL 2 TIMES DAILY
Qty: 14 TABLET | Refills: 0 | OUTPATIENT
Start: 2018-11-16 | End: 2019-04-07

## 2018-11-16 NOTE — PATIENT INSTRUCTIONS
Calorie Counting for Weight Loss  Calories are units of energy. Your body needs a certain amount of calories from food to keep you going throughout the day. When you eat more calories than your body needs, your body stores the extra calories as fat. When you eat fewer calories than your body needs, your body burns fat to get the energy it needs.  Calorie counting means keeping track of how many calories you eat and drink each day. Calorie counting can be helpful if you need to lose weight. If you make sure to eat fewer calories than your body needs, you should lose weight. Ask your health care provider what a healthy weight is for you.  For calorie counting to work, you will need to eat the right number of calories in a day in order to lose a healthy amount of weight per week. A dietitian can help you determine how many calories you need in a day and will give you suggestions on how to reach your calorie goal.  · A healthy amount of weight to lose per week is usually 1-2 lb (0.5-0.9 kg). This usually means that your daily calorie intake should be reduced by 500-750 calories.  · Eating 1,200 - 1,500 calories per day can help most women lose weight.  · Eating 1,500 - 1,800 calories per day can help most men lose weight.    What do I need to know about calorie counting?  In order to meet your daily calorie goal, you will need to:  · Find out how many calories are in each food you would like to eat. Try to do this before you eat.  · Decide how much of the food you plan to eat.  · Write down what you ate and how many calories it had. Doing this is called keeping a food log.    To successfully lose weight, it is important to balance calorie counting with a healthy lifestyle that includes regular activity. Aim for 150 minutes of moderate exercise (such as walking) or 75 minutes of vigorous exercise (such as running) each week.  Where do I find calorie information?    The number of calories in a food can be found on a  Nutrition Facts label. If a food does not have a Nutrition Facts label, try to look up the calories online or ask your dietitian for help.  Remember that calories are listed per serving. If you choose to have more than one serving of a food, you will have to multiply the calories per serving by the amount of servings you plan to eat. For example, the label on a package of bread might say that a serving size is 1 slice and that there are 90 calories in a serving. If you eat 1 slice, you will have eaten 90 calories. If you eat 2 slices, you will have eaten 180 calories.  How do I keep a food log?  Immediately after each meal, record the following information in your food log:  · What you ate. Don't forget to include toppings, sauces, and other extras on the food.  · How much you ate. This can be measured in cups, ounces, or number of items.  · How many calories each food and drink had.  · The total number of calories in the meal.    Keep your food log near you, such as in a small notebook in your pocket, or use a mobile steffen or website. Some programs will calculate calories for you and show you how many calories you have left for the day to meet your goal.  What are some calorie counting tips?  · Use your calories on foods and drinks that will fill you up and not leave you hungry:  ? Some examples of foods that fill you up are nuts and nut butters, vegetables, lean proteins, and high-fiber foods like whole grains. High-fiber foods are foods with more than 5 g fiber per serving.  ? Drinks such as sodas, specialty coffee drinks, alcohol, and juices have a lot of calories, yet do not fill you up.  · Eat nutritious foods and avoid empty calories. Empty calories are calories you get from foods or beverages that do not have many vitamins or protein, such as candy, sweets, and soda. It is better to have a nutritious high-calorie food (such as an avocado) than a food with few nutrients (such as a bag of chips).  · Know how  "many calories are in the foods you eat most often. This will help you calculate calorie counts faster.  · Pay attention to calories in drinks. Low-calorie drinks include water and unsweetened drinks.  · Pay attention to nutrition labels for \"low fat\" or \"fat free\" foods. These foods sometimes have the same amount of calories or more calories than the full fat versions. They also often have added sugar, starch, or salt, to make up for flavor that was removed with the fat.  · Find a way of tracking calories that works for you. Get creative. Try different apps or programs if writing down calories does not work for you.  What are some portion control tips?  · Know how many calories are in a serving. This will help you know how many servings of a certain food you can have.  · Use a measuring cup to measure serving sizes. You could also try weighing out portions on a kitchen scale. With time, you will be able to estimate serving sizes for some foods.  · Take some time to put servings of different foods on your favorite plates, bowls, and cups so you know what a serving looks like.  · Try not to eat straight from a bag or box. Doing this can lead to overeating. Put the amount you would like to eat in a cup or on a plate to make sure you are eating the right portion.  · Use smaller plates, glasses, and bowls to prevent overeating.  · Try not to multitask (for example, watch TV or use your computer) while eating. If it is time to eat, sit down at a table and enjoy your food. This will help you to know when you are full. It will also help you to be aware of what you are eating and how much you are eating.  What are tips for following this plan?  Reading food labels  · Check the calorie count compared to the serving size. The serving size may be smaller than what you are used to eating.  · Check the source of the calories. Make sure the food you are eating is high in vitamins and protein and low in saturated and trans " fats.  Shopping  · Read nutrition labels while you shop. This will help you make healthy decisions before you decide to purchase your food.  · Make a grocery list and stick to it.  Cooking  · Try to cook your favorite foods in a healthier way. For example, try baking instead of frying.  · Use low-fat dairy products.  Meal planning  · Use more fruits and vegetables. Half of your plate should be fruits and vegetables.  · Include lean proteins like poultry and fish.  How do I count calories when eating out?  · Ask for smaller portion sizes.  · Consider sharing an entree and sides instead of getting your own entree.  · If you get your own entree, eat only half. Ask for a box at the beginning of your meal and put the rest of your entree in it so you are not tempted to eat it.  · If calories are listed on the menu, choose the lower calorie options.  · Choose dishes that include vegetables, fruits, whole grains, low-fat dairy products, and lean protein.  · Choose items that are boiled, broiled, grilled, or steamed. Stay away from items that are buttered, battered, fried, or served with cream sauce. Items labeled “crispy” are usually fried, unless stated otherwise.  · Choose water, low-fat milk, unsweetened iced tea, or other drinks without added sugar. If you want an alcoholic beverage, choose a lower calorie option such as a glass of wine or light beer.  · Ask for dressings, sauces, and syrups on the side. These are usually high in calories, so you should limit the amount you eat.  · If you want a salad, choose a garden salad and ask for grilled meats. Avoid extra toppings like villafana, cheese, or fried items. Ask for the dressing on the side, or ask for olive oil and vinegar or lemon to use as dressing.  · Estimate how many servings of a food you are given. For example, a serving of cooked rice is ½ cup or about the size of half a baseball. Knowing serving sizes will help you be aware of how much food you are eating at  restaurants. The list below tells you how big or small some common portion sizes are based on everyday objects:  ? 1 oz--4 stacked dice.  ? 3 oz--1 deck of cards.  ? 1 tsp--1 die.  ? 1 Tbsp--½ a ping-pong ball.  ? 2 Tbsp--1 ping-pong ball.  ? ½ cup--½ baseball.  ? 1 cup--1 baseball.  Summary  · Calorie counting means keeping track of how many calories you eat and drink each day. If you eat fewer calories than your body needs, you should lose weight.  · A healthy amount of weight to lose per week is usually 1-2 lb (0.5-0.9 kg). This usually means reducing your daily calorie intake by 500-750 calories.  · The number of calories in a food can be found on a Nutrition Facts label. If a food does not have a Nutrition Facts label, try to look up the calories online or ask your dietitian for help.  · Use your calories on foods and drinks that will fill you up, and not on foods and drinks that will leave you hungry.  · Use smaller plates, glasses, and bowls to prevent overeating.  This information is not intended to replace advice given to you by your health care provider. Make sure you discuss any questions you have with your health care provider.  Document Released: 12/18/2006 Document Revised: 11/17/2017 Document Reviewed: 11/17/2017  [employer] Interactive Patient Education © 2018 [employer]        Exercising to Lose Weight  Exercising can help you to lose weight. In order to lose weight through exercise, you need to do vigorous-intensity exercise. You can tell that you are exercising with vigorous intensity if you are breathing very hard and fast and cannot hold a conversation while exercising.  Moderate-intensity exercise helps to maintain your current weight. You can tell that you are exercising at a moderate level if you have a higher heart rate and faster breathing, but you are still able to hold a conversation.  How often should I exercise?  Choose an activity that you enjoy and set realistic goals. Your health  care provider can help you to make an activity plan that works for you. Exercise regularly as directed by your health care provider. This may include:  · Doing resistance training twice each week, such as:  ? Push-ups.  ? Sit-ups.  ? Lifting weights.  ? Using resistance bands.  · Doing a given intensity of exercise for a given amount of time. Choose from these options:  ? 150 minutes of moderate-intensity exercise every week.  ? 75 minutes of vigorous-intensity exercise every week.  ? A mix of moderate-intensity and vigorous-intensity exercise every week.    Children, pregnant women, people who are out of shape, people who are overweight, and older adults may need to consult a health care provider for individual recommendations. If you have any sort of medical condition, be sure to consult your health care provider before starting a new exercise program.  What are some activities that can help me to lose weight?  · Walking at a rate of at least 4.5 miles an hour.  · Jogging or running at a rate of 5 miles per hour.  · Biking at a rate of at least 10 miles per hour.  · Lap swimming.  · Roller-skating or in-line skating.  · Cross-country skiing.  · Vigorous competitive sports, such as football, basketball, and soccer.  · Jumping rope.  · Aerobic dancing.  How can I be more active in my day-to-day activities?  · Use the stairs instead of the elevator.  · Take a walk during your lunch break.  · If you drive, park your car farther away from work or school.  · If you take public transportation, get off one stop early and walk the rest of the way.  · Make all of your phone calls while standing up and walking around.  · Get up, stretch, and walk around every 30 minutes throughout the day.  What guidelines should I follow while exercising?  · Do not exercise so much that you hurt yourself, feel dizzy, or get very short of breath.  · Consult your health care provider prior to starting a new exercise program.  · Wear  comfortable clothes and shoes with good support.  · Drink plenty of water while you exercise to prevent dehydration or heat stroke. Body water is lost during exercise and must be replaced.  · Work out until you breathe faster and your heart beats faster.  This information is not intended to replace advice given to you by your health care provider. Make sure you discuss any questions you have with your health care provider.  Document Released: 01/20/2012 Document Revised: 05/25/2017 Document Reviewed: 05/21/2015  [employer] Interactive Patient Education © 2018 [employer]

## 2018-11-16 NOTE — PROGRESS NOTES
"Subjective   Josiah Loomis is a 33 y.o. male. Presents today for   Chief Complaint   Patient presents with   • Exposure to STD     testing       History of Present Illness  Wife tested + for trich at pap smear;  Patient denies being with someone else.  Would like checked.      Also hx of htn, off meds after significant weight loss;  Also predm, due for dm2 screening.  Lipids elevatd in past.    Review of Systems   Respiratory: Negative for shortness of breath.    Cardiovascular: Negative for chest pain.   Genitourinary: Negative for decreased urine volume, difficulty urinating, dysuria, penile pain, penile swelling and urgency.       Patient Active Problem List   Diagnosis   • Sprain of acromioclavicular ligament   • Atopic dermatitis   • Dyslipidemia   • Abdominal hernia   • Prediabetes       Social History     Socioeconomic History   • Marital status:      Spouse name: Not on file   • Number of children: Not on file   • Years of education: Not on file   • Highest education level: Not on file   Tobacco Use   • Smoking status: Never Smoker   • Smokeless tobacco: Never Used   Substance and Sexual Activity   • Alcohol use: Yes     Comment: rarely   • Drug use: No   • Sexual activity: Defer       No Known Allergies    Current Outpatient Medications on File Prior to Visit   Medication Sig Dispense Refill   • HYDROcodone-acetaminophen (NORCO) 5-325 MG per tablet Take 1 tablet by mouth Every 6 (Six) Hours As Needed for Moderate Pain . 10 tablet 0     No current facility-administered medications on file prior to visit.        Objective   Vitals:    11/16/18 0809   BP: 140/72   BP Location: Right arm   Patient Position: Sitting   Cuff Size: Large Adult   Pulse: 79   SpO2: 97%   Weight: (!) 156 kg (344 lb)   Height: 177.8 cm (70\")       Physical Exam   Constitutional: He appears well-developed and well-nourished.   HENT:   Head: Normocephalic and atraumatic.   Neck: Neck supple. No JVD present. No thyromegaly present. "   Cardiovascular: Normal rate, regular rhythm and normal heart sounds. Exam reveals no gallop and no friction rub.   No murmur heard.  Pulmonary/Chest: Effort normal and breath sounds normal. No respiratory distress. He has no wheezes. He has no rales.   Abdominal: Soft. Bowel sounds are normal. He exhibits no distension. There is no tenderness. There is no rebound and no guarding.   Musculoskeletal: He exhibits no edema.   Neurological: He is alert.   Skin: Skin is warm and dry.   Psychiatric: He has a normal mood and affect. His behavior is normal.   Nursing note and vitals reviewed.      Assessment/Plan   Josiah was seen today for exposure to std.    Diagnoses and all orders for this visit:    Dyslipidemia  -     Comprehensive Metabolic Panel  -     Lipid Panel  -     RPR  -     HIV-1 / O / 2 Ag / Antibody 4th Generation  -     Chlamydia trachomatis, Neisseria gonorrhoeae, Trichomonas vaginalis, PCR - Urine, Urine, Clean Catch  -     Hemoglobin A1c    Prediabetes  -     Comprehensive Metabolic Panel  -     Lipid Panel  -     RPR  -     HIV-1 / O / 2 Ag / Antibody 4th Generation  -     Chlamydia trachomatis, Neisseria gonorrhoeae, Trichomonas vaginalis, PCR - Urine, Urine, Clean Catch  -     Hemoglobin A1c    Morbidly obese (CMS/HCC)  -     Comprehensive Metabolic Panel  -     Lipid Panel  -     RPR  -     HIV-1 / O / 2 Ag / Antibody 4th Generation  -     Chlamydia trachomatis, Neisseria gonorrhoeae, Trichomonas vaginalis, PCR - Urine, Urine, Clean Catch  -     Hemoglobin A1c    STD exposure  -     RPR  -     HIV-1 / O / 2 Ag / Antibody 4th Generation  -     Chlamydia trachomatis, Neisseria gonorrhoeae, Trichomonas vaginalis, PCR - Urine, Urine, Clean Catch  -     metroNIDAZOLE (FLAGYL) 500 MG tablet; Take 1 tablet by mouth 2 (Two) Times a Day.      Obesity - will give educational material on weight loss;  Has lost a lot of weight laredy  Patient has been erroneously marked as diabetic. Based on the available  clinical information, he does not have diabetes and should therefore be excluded from diabetic health maintenance and quality measures for the remainder of the reporting period.  He has prediabetes.       -Follow up: 6 months and prn

## 2018-11-20 LAB
ALBUMIN SERPL-MCNC: 4 G/DL (ref 3.5–5.2)
ALBUMIN/GLOB SERPL: 1.1 G/DL
ALP SERPL-CCNC: 297 U/L (ref 39–117)
ALT SERPL-CCNC: 70 U/L (ref 1–41)
AST SERPL-CCNC: 56 U/L (ref 1–40)
BILIRUB SERPL-MCNC: 0.4 MG/DL (ref 0.1–1.2)
BUN SERPL-MCNC: 9 MG/DL (ref 6–20)
BUN/CREAT SERPL: 9.4 (ref 7–25)
C TRACH RRNA SPEC QL NAA+PROBE: NEGATIVE
CALCIUM SERPL-MCNC: 9.8 MG/DL (ref 8.6–10.5)
CHLORIDE SERPL-SCNC: 100 MMOL/L (ref 98–107)
CHOLEST SERPL-MCNC: 282 MG/DL (ref 0–200)
CO2 SERPL-SCNC: 27.6 MMOL/L (ref 22–29)
CREAT SERPL-MCNC: 0.96 MG/DL (ref 0.76–1.27)
GLOBULIN SER CALC-MCNC: 3.7 GM/DL
GLUCOSE SERPL-MCNC: 112 MG/DL (ref 65–99)
HBA1C MFR BLD: 5.9 % (ref 4.8–5.6)
HDLC SERPL-MCNC: 48 MG/DL (ref 40–60)
HIV 1+2 AB+HIV1 P24 AG SERPL QL IA: NON REACTIVE
LDLC SERPL CALC-MCNC: 204 MG/DL (ref 0–100)
N GONORRHOEA RRNA SPEC QL NAA+PROBE: NEGATIVE
POTASSIUM SERPL-SCNC: 4.3 MMOL/L (ref 3.5–5.2)
PROT SERPL-MCNC: 7.7 G/DL (ref 6–8.5)
RPR SER QL: NORMAL
SODIUM SERPL-SCNC: 139 MMOL/L (ref 136–145)
T VAGINALIS RRNA VAG QL NAA+PROBE: NEGATIVE
TRIGL SERPL-MCNC: 148 MG/DL (ref 0–150)
VLDLC SERPL CALC-MCNC: 29.6 MG/DL (ref 5–40)

## 2018-11-23 NOTE — PROGRESS NOTES
Call and mail copy of results to patient.  STD testing is all negative.  His liver enzymes are elevated again and a little worse.  Work on weight loss.  Blood sugar borderline, work on weight loss and exercise.

## 2019-04-07 ENCOUNTER — HOSPITAL ENCOUNTER (EMERGENCY)
Facility: HOSPITAL | Age: 34
Discharge: HOME | End: 2019-04-07
Attending: EMERGENCY MEDICINE | Admitting: EMERGENCY MEDICINE

## 2019-04-07 VITALS
SYSTOLIC BLOOD PRESSURE: 147 MMHG | WEIGHT: 315 LBS | HEIGHT: 72 IN | BODY MASS INDEX: 42.66 KG/M2 | OXYGEN SATURATION: 98 % | DIASTOLIC BLOOD PRESSURE: 87 MMHG | HEART RATE: 98 BPM | TEMPERATURE: 99.2 F | RESPIRATION RATE: 16 BRPM

## 2019-04-07 DIAGNOSIS — J10.1 INFLUENZA B: Primary | ICD-10-CM

## 2019-04-07 LAB
FLUAV AG NPH QL: NEGATIVE
FLUBV AG NPH QL IA: POSITIVE

## 2019-04-07 PROCEDURE — 99282 EMERGENCY DEPT VISIT SF MDM: CPT

## 2019-04-07 PROCEDURE — 87804 INFLUENZA ASSAY W/OPTIC: CPT | Performed by: PHYSICIAN ASSISTANT

## 2019-04-07 PROCEDURE — 99283 EMERGENCY DEPT VISIT LOW MDM: CPT

## 2019-04-07 RX ORDER — CETIRIZINE HYDROCHLORIDE 10 MG/1
10 TABLET ORAL DAILY
Qty: 30 TABLET | Refills: 0 | Status: SHIPPED | OUTPATIENT
Start: 2019-04-07 | End: 2019-05-24

## 2019-04-07 RX ORDER — PREDNISOLONE ACETATE 10 MG/ML
1 SUSPENSION/ DROPS OPHTHALMIC 4 TIMES DAILY
COMMUNITY
End: 2019-04-07

## 2019-04-07 RX ORDER — FLUTICASONE PROPIONATE 50 MCG
2 SPRAY, SUSPENSION (ML) NASAL DAILY
Qty: 18.2 ML | Refills: 0 | Status: SHIPPED | OUTPATIENT
Start: 2019-04-07 | End: 2019-05-24

## 2019-04-07 NOTE — DISCHARGE INSTRUCTIONS
Home, rest, medicine as prescribed, keep well hydrated, tylenol or ibuprofen as needed, follow up with PCP for recheck. Return to care with further concerns.

## 2019-04-07 NOTE — ED PROVIDER NOTES
The patient presents complaining of nasal congestion that started three days ago. Pt denies fever or cough but reports he was exposed to flu B at work.     Physical Exam:  Patient is nontoxic appearing and in NAD. The pt is alert and oriented X 3.  HENT: normocephalic, atraumatic  Lungs/cardiovascular: The pt's heart is RRR without murmur. The pt's lungs are clear to auscultation. Nasal congestion.  Abdomen: The pt's abd is soft and nontender.  Back/extremities: FROM  Skin: warm and dry    Plan: Discussed plan to discharge the pt with recommendations to take medications for symptomatic treatment. Pt understands and agrees with the plan. All questions have been answered.      MD ATTESTATION NOTE    The ANALISA and I have discussed this patient's history, physical exam, and treatment plan.  I have reviewed the documentation and personally had a face to face interaction with the patient. I affirm the documentation and agree with the treatment and plan.  The attached note describes my personal findings.    Documentation assistance provided by mirna Jimenez for Dr Soler. Information recorded by the scribe was done at my direction and has been verified and validated by me.       Tracy Jimenez  04/07/19 0474       Reid Soler MD  04/07/19 1032

## 2019-04-07 NOTE — ED PROVIDER NOTES
EMERGENCY DEPARTMENT ENCOUNTER    CHIEF COMPLAINT  Chief Complaint: Congestion  History given by: Patient  History limited by:   Room Number: 29/29  PMD: Kodi Rosario DO      HPI:  Pt is a 34 y.o. male who presents complaining of congestion that began 2 days ago. He also reports having some rhinorrhea. He denies any sore throat or cough. He also denies any abd pain, N/V/D, fever, or chills. Pt reports some generalized myalgia. He states recent sick contact at work. Pt has had 2 supervisors dx with flu. No hx of pulmonary disease.     Duration: 2 days  Onset: gradual  Timing: intermittent  Location: upper respiratory  Quality: congestion  Intensity/Severity: moderate  Progression: unchanged  Associated Symptoms: none  Aggravating Factors: none  Alleviating Factors: none  Previous Episodes: none  Treatment before arrival: none    PAST MEDICAL HISTORY  Active Ambulatory Problems     Diagnosis Date Noted   • Sprain of acromioclavicular ligament 05/26/2016   • Atopic dermatitis 05/26/2016   • Dyslipidemia 05/26/2016   • Abdominal hernia 05/26/2016   • Prediabetes 05/26/2016   • Morbidly obese (CMS/HCC) 11/16/2018     Resolved Ambulatory Problems     Diagnosis Date Noted   • Benign essential hypertension 05/26/2016     Past Medical History:   Diagnosis Date   • GERD (gastroesophageal reflux disease)    • Hyperlipidemia    • Hypertension        PAST SURGICAL HISTORY  History reviewed. No pertinent surgical history.    FAMILY HISTORY  History reviewed. No pertinent family history.    SOCIAL HISTORY  Social History     Socioeconomic History   • Marital status:      Spouse name: Not on file   • Number of children: Not on file   • Years of education: Not on file   • Highest education level: Not on file   Tobacco Use   • Smoking status: Never Smoker   • Smokeless tobacco: Never Used   Substance and Sexual Activity   • Alcohol use: Yes     Comment: rarely   • Drug use: No   • Sexual activity: Defer        ALLERGIES  Patient has no known allergies.    REVIEW OF SYSTEMS  Review of Systems   Constitutional: Negative for activity change, appetite change and fever.   HENT: Positive for congestion, rhinorrhea, sinus pressure and sneezing. Negative for sore throat.    Eyes: Negative.    Respiratory: Negative for cough and shortness of breath.    Cardiovascular: Negative for chest pain and leg swelling.   Gastrointestinal: Negative for abdominal pain, diarrhea and vomiting.   Endocrine: Negative.    Genitourinary: Negative for decreased urine volume and dysuria.   Musculoskeletal: Negative for neck pain.   Skin: Negative for rash and wound.   Allergic/Immunologic: Negative.    Neurological: Negative for weakness, numbness and headaches.   Hematological: Negative.    Psychiatric/Behavioral: Negative.    All other systems reviewed and are negative.      PHYSICAL EXAM  ED Triage Vitals [04/07/19 0237]   Temp Heart Rate Resp BP SpO2   99.2 °F (37.3 °C) 111 18 149/96 98 %      Temp src Heart Rate Source Patient Position BP Location FiO2 (%)   Tympanic Monitor Sitting Right arm --       Physical Exam   Constitutional: He is oriented to person, place, and time and well-developed, well-nourished, and in no distress. No distress.   HENT:   Head: Normocephalic and atraumatic.   Right Ear: Tympanic membrane is not erythematous.   Left Ear: Tympanic membrane is not erythematous.   Mouth/Throat: Oropharynx is clear and moist. No oropharyngeal exudate or posterior oropharyngeal erythema.   Eyes: EOM are normal. Pupils are equal, round, and reactive to light.   Neck: Normal range of motion. Neck supple.   Cardiovascular: Normal rate, regular rhythm and normal heart sounds.   Pulmonary/Chest: Effort normal and breath sounds normal. No respiratory distress. He has no wheezes.   Abdominal: Soft. There is no tenderness. There is no rebound and no guarding.   Musculoskeletal: Normal range of motion. He exhibits no edema.    Lymphadenopathy:     He has no cervical adenopathy.   Neurological: He is alert and oriented to person, place, and time. He has normal sensation and normal strength.   Skin: Skin is warm and dry. No rash noted.   Psychiatric: Mood and affect normal.   Nursing note and vitals reviewed.      LAB RESULTS  Lab Results (last 24 hours)     Procedure Component Value Units Date/Time    Influenza Antigen, Rapid - Swab, Nasopharynx [[id number]]  (Abnormal) Collected:  04/07/19 0326    Specimen:  Swab from Nasopharynx Updated:  04/07/19 0351     Influenza A Ag, EIA Negative     Influenza B Ag, EIA Positive          I ordered the above labs and reviewed the results    RADIOLOGY  No orders to display        I ordered the above noted radiological studies. Interpreted by radiologist.  Reviewed by me in PACS.       PROCEDURES  Procedures      PROGRESS AND CONSULTS     3:15 AM  Ordered flu swab.  3:53 AM  Rechecked with pt. Informed pt of his positive flu swab and plan to discharge. Pt understands and agrees with plan. All concerns were addressed.  3:57 AM  Reviewed pt's history and workup with Dr. Soler.  After a bedside evaluation; Dr Soler agrees with the plan of care        MEDICAL DECISION MAKING  Results were reviewed/discussed with the patient and they were also made aware of online access. Pt also made aware that some labs, such as cultures, will not be resulted during ER visit and follow up with PMD is necessary.     MDM  Number of Diagnoses or Management Options  Influenza B:      Amount and/or Complexity of Data Reviewed  Clinical lab tests: reviewed and ordered (Flu B positive)           DIAGNOSIS  Final diagnoses:   Influenza B       DISPOSITION  DISCHARGE    Patient discharged in stable condition.    Reviewed implications of results, diagnosis, meds, responsibility to follow up, warning signs and symptoms of possible worsening, potential complications and reasons to return to ER.    Patient/Family voiced  understanding of above instructions.    Discussed plan for discharge, as there is no emergent indication for admission. Patient referred to primary care provider for BP management due to today's BP. Pt/family is agreeable and understands need for follow up and repeat testing.  Pt is aware that discharge does not mean that nothing is wrong but it indicates no emergency is present that requires admission and they must continue care with follow-up as given below or physician of their choice.     FOLLOW-UP  Kodi Rosario, DO  [address]  [place] [id number] [phone]    Schedule an appointment as soon as possible for a visit in 1 week           Medication List      New Prescriptions    cetirizine 10 MG tablet  Commonly known as:  zyrTEC  Take 1 tablet by mouth Daily.     fluticasone 50 MCG/ACT nasal spray  Commonly known as:  FLONASE  2 sprays into the nostril(s) as directed by provider Daily.        Stop    brimonidine 0.1 % solution ophthalmic solution  Commonly known as:  ALPHAGAN P     HYDROcodone-acetaminophen 5-325 MG per tablet  Commonly known as:  NORCO     metroNIDAZOLE 500 MG tablet  Commonly known as:  FLAGYL     prednisoLONE acetate 1 % ophthalmic suspension  Commonly known as:  PRED FORTE              Latest Documented Vital Signs:  As of 4:39 AM  BP- 147/87 HR- 98 Temp- 99.2 °F (37.3 °C) (Tympanic) O2 sat- 98%    --  Documentation assistance provided by mirna Knox for Amadeo Bangura PA-C.  Information recorded by the scribe was done at my direction and has been verified and validated by me.     Josias Knox  04/07/19 9492       Amadeo Bangura PA  04/07/19 5015

## 2019-04-07 NOTE — ED NOTES
Pt to the ER with sinus congestion and exposure to the flu.      Phyllis Burgess RN  04/07/19 0230

## 2019-04-11 ENCOUNTER — OFFICE VISIT (OUTPATIENT)
Dept: FAMILY MEDICINE CLINIC | Facility: CLINIC | Age: 34
End: 2019-04-11

## 2019-04-11 VITALS
HEART RATE: 97 BPM | SYSTOLIC BLOOD PRESSURE: 140 MMHG | TEMPERATURE: 98.4 F | BODY MASS INDEX: 42.66 KG/M2 | WEIGHT: 315 LBS | OXYGEN SATURATION: 98 % | HEIGHT: 72 IN | DIASTOLIC BLOOD PRESSURE: 82 MMHG

## 2019-04-11 DIAGNOSIS — J10.1 INFLUENZA B: Primary | ICD-10-CM

## 2019-04-11 PROCEDURE — 99213 OFFICE O/P EST LOW 20 MIN: CPT | Performed by: NURSE PRACTITIONER

## 2019-04-11 RX ORDER — BENZONATATE 100 MG/1
100 CAPSULE ORAL 3 TIMES DAILY PRN
Qty: 30 CAPSULE | Refills: 0 | Status: SHIPPED | OUTPATIENT
Start: 2019-04-11 | End: 2019-05-24

## 2019-04-11 NOTE — PROGRESS NOTES
Subjective     Josiah Loomis is a 34 y.o. male who presents with   Chief Complaint   Patient presents with   • Influenza     dx with influenza B at ER on 4/7 and is here for a follow up on this. Was advised to return to work tomorrow but still not feeling well.        4/7/2019 DX at ER with Influenza B  Feeling really fatigued and unable to attend work. Wants to follow up with PCP as directed.      Influenza   This is a new problem. The current episode started in the past 7 days. The problem occurs constantly. The problem has been gradually improving. Associated symptoms include coughing and fatigue. Pertinent negatives include no chest pain or chills.   Cough   This is a new problem. The current episode started in the past 7 days. The problem has been gradually improving. The problem occurs constantly. The cough is productive of sputum. Associated symptoms include postnasal drip and rhinorrhea. Pertinent negatives include no chest pain, chills, hemoptysis, nasal congestion or shortness of breath. Nothing aggravates the symptoms. He has tried nothing for the symptoms. The treatment provided mild relief.        Review of Systems   Constitutional: Positive for fatigue. Negative for chills.   HENT: Positive for postnasal drip and rhinorrhea.    Respiratory: Positive for cough. Negative for hemoptysis and shortness of breath.    Cardiovascular: Negative for chest pain.   All other systems reviewed and are negative.        The following portions of the patient's history were reviewed and updated as appropriate: allergies, current medications, past family history, past medical history, past social history, past surgical history and problem list.      Patient Active Problem List    Diagnosis Date Noted   • Morbidly obese (CMS/HCC) 11/16/2018   • Sprain of acromioclavicular ligament 05/26/2016   • Atopic dermatitis 05/26/2016   • Dyslipidemia 05/26/2016   • Abdominal hernia 05/26/2016   • Prediabetes 05/26/2016  "      Current Outpatient Medications on File Prior to Visit   Medication Sig Dispense Refill   • cetirizine (zyrTEC) 10 MG tablet Take 1 tablet by mouth Daily. 30 tablet 0   • fluticasone (FLONASE) 50 MCG/ACT nasal spray 2 sprays into the nostril(s) as directed by provider Daily. 18.2 mL 0     No current facility-administered medications on file prior to visit.        Objective     /82   Pulse 97   Temp 98.4 °F (36.9 °C) (Oral)   Ht 182.9 cm (72\")   Wt (!) 158 kg (349 lb)   SpO2 98%   BMI 47.33 kg/m²     Physical Exam   Constitutional: He is oriented to person, place, and time. He appears well-developed and well-nourished.   Eyes: Conjunctivae are normal. Pupils are equal, round, and reactive to light.   Cardiovascular: Normal rate and regular rhythm.   Pulmonary/Chest: Effort normal and breath sounds normal.   Abdominal: Soft. Bowel sounds are normal.   Musculoskeletal: Normal range of motion. He exhibits no edema.   Neurological: He is alert and oriented to person, place, and time. No cranial nerve deficit.   Skin: Skin is warm. No erythema.   Psychiatric: He has a normal mood and affect. His behavior is normal. Judgment and thought content normal.   Vitals reviewed.      Procedures    Assessment/Plan   Josiah was seen today for influenza.    Diagnoses and all orders for this visit:    Influenza B  -     benzonatate (TESSALON PERLES) 100 MG capsule; Take 1 capsule by mouth 3 (Three) Times a Day As Needed for Cough.        Discussion  Extended work note RTC if problem persist or worsen.       Future Appointments   Date Time Provider Department Center   5/24/2019  8:00 AM Kodi Rosario DO [facility] None         "

## 2019-05-24 ENCOUNTER — OFFICE VISIT (OUTPATIENT)
Dept: FAMILY MEDICINE CLINIC | Facility: CLINIC | Age: 34
End: 2019-05-24

## 2019-05-24 VITALS
WEIGHT: 315 LBS | HEIGHT: 72 IN | SYSTOLIC BLOOD PRESSURE: 128 MMHG | OXYGEN SATURATION: 97 % | DIASTOLIC BLOOD PRESSURE: 78 MMHG | BODY MASS INDEX: 42.66 KG/M2 | HEART RATE: 91 BPM

## 2019-05-24 DIAGNOSIS — M77.11 LATERAL EPICONDYLITIS OF RIGHT ELBOW: ICD-10-CM

## 2019-05-24 DIAGNOSIS — H20.9 UVEITIS OF BOTH EYES: Primary | ICD-10-CM

## 2019-05-24 PROCEDURE — 99213 OFFICE O/P EST LOW 20 MIN: CPT | Performed by: FAMILY MEDICINE

## 2019-05-24 RX ORDER — METHYLPREDNISOLONE 4 MG/1
TABLET ORAL
Qty: 21 TABLET | Refills: 0 | Status: SHIPPED | OUTPATIENT
Start: 2019-05-24 | End: 2019-08-08

## 2019-05-24 NOTE — PROGRESS NOTES
Subjective   Josiah Loomis is a 34 y.o. male. Presents today for   Chief Complaint   Patient presents with   • Eye Problem     follow up   • elbow pain     right       Eye Problem    Both eyes are affected.This is a new problem. The current episode started more than 1 year ago. The problem occurs constantly. The problem has been waxing and waning. There was no injury mechanism. The pain is moderate. Associated symptoms include blurred vision and eye redness. Pertinent negatives include no double vision, fever, nausea, vomiting or weakness. Associated symptoms comments: Patient seeing ophthalmology just had procedure left eye as worse.  Reports told eye pressure high.  Dx with uveitis per patient.  Reviewed ophthalmology notes and per them ABBEY negative, RF negative, syphilis neg, neg cxr and ACE level high 108.  They recommend see rheumatology.  He denies swollen or stiff joints.  Denies dry mouth.  Denies family hx of rheumatoid arthritis or lupus.  Doesn't know Fathers side well.  Denies rashes, TB exposure;  No penile d/c;  No arthralgias;  Does have some right elbow pain, hurting from work.  Denies cough, dyspnea.  Denies hx of sarcoid.. He has tried eye drops for the symptoms. The treatment provided mild relief.       Review of Systems   Constitutional: Negative for fever. Unexpected weight change: Weight gain or loss.   Eyes: Positive for blurred vision, redness and visual disturbance. Negative for double vision.   Respiratory: Negative for shortness of breath and wheezing.    Cardiovascular: Negative for chest pain, palpitations and leg swelling.   Gastrointestinal: Negative for abdominal pain, nausea and vomiting.   Genitourinary: Negative for discharge.   Musculoskeletal: Negative for myalgias.   Skin: Negative for rash.   Neurological: Negative for dizziness, tremors, syncope, facial asymmetry, speech difficulty, weakness, light-headedness, numbness and headaches.   Hematological: Negative for adenopathy.  "      Patient Active Problem List   Diagnosis   • Sprain of acromioclavicular ligament   • Atopic dermatitis   • Dyslipidemia   • Abdominal hernia   • Prediabetes   • Morbidly obese (CMS/HCC)       Social History     Socioeconomic History   • Marital status:      Spouse name: Not on file   • Number of children: Not on file   • Years of education: Not on file   • Highest education level: Not on file   Tobacco Use   • Smoking status: Never Smoker   • Smokeless tobacco: Never Used   Substance and Sexual Activity   • Alcohol use: Yes     Comment: rarely   • Drug use: No   • Sexual activity: Defer       No Known Allergies    Current Outpatient Medications on File Prior to Visit   Medication Sig Dispense Refill   • [DISCONTINUED] benzonatate (TESSALON PERLES) 100 MG capsule Take 1 capsule by mouth 3 (Three) Times a Day As Needed for Cough. 30 capsule 0   • [DISCONTINUED] cetirizine (zyrTEC) 10 MG tablet Take 1 tablet by mouth Daily. 30 tablet 0   • [DISCONTINUED] fluticasone (FLONASE) 50 MCG/ACT nasal spray 2 sprays into the nostril(s) as directed by provider Daily. 18.2 mL 0     No current facility-administered medications on file prior to visit.        Objective   Vitals:    05/24/19 1128   BP: 128/78   BP Location: Right arm   Patient Position: Sitting   Cuff Size: Large Adult   Pulse: 91   SpO2: 97%   Weight: (!) 159 kg (350 lb)   Height: 182.9 cm (72\")       Physical Exam   Constitutional: He appears well-developed and well-nourished.   Eyes: Left conjunctiva is injected.   Left ARVIND   Neck: Neck supple.   Musculoskeletal:        Right elbow: He exhibits normal range of motion. Tenderness found. Lateral epicondyle tenderness noted. No radial head, no medial epicondyle and no olecranon process tenderness noted.   Neurological: He is alert.   Skin: Skin is warm and dry.   Psychiatric: He has a normal mood and affect. His behavior is normal.   Nursing note and vitals reviewed.      Assessment/Plan   Problems " Addressed this Visit     None      Visit Diagnoses     Uveitis of both eyes    -  Primary    Relevant Orders    Comprehensive Metabolic Panel    CBC & Differential    ABBEY With / DsDNA, RNP, Sjogrens A / B, Smith    Sedimentation Rate    C-reactive Protein    Ambulatory Referral to Rheumatology    ANCA Panel    Angiotensin Converting Enzyme    HLA-B27 Antigen    Lyme, Total Antibody Test / Reflex    C4+C3    FTA Antibodies, IgG & IgM - Cerebrospinal Fluid, Lumbar Puncture    QuantiFERON TB Gold    Lateral epicondylitis of right elbow        Relevant Medications    methylPREDNISolone (MEDROL, MICHAEL,) 4 MG tablet        Refer to rheumatology for chronic uveitis, uncertain etiology;  Will check labs prior for possible causes.  Had mildly elevated ACE level, will recheck.  No prior hx of sarcoid.  Elbow pain - medrol pack;  Prn - RTC if worse or no improvement.         -Follow up: will call with results;  Patient to call if doesn't hear back on referral to rheumatology.

## 2019-05-30 LAB
ACE SERPL-CCNC: 113 U/L (ref 14–82)
ALBUMIN SERPL-MCNC: 4.1 G/DL (ref 3.5–5.5)
ALBUMIN/GLOB SERPL: 1.4 {RATIO} (ref 1.2–2.2)
ALP SERPL-CCNC: 208 IU/L (ref 39–117)
ALT SERPL-CCNC: 59 IU/L (ref 0–44)
ANA SER QL: NEGATIVE
AST SERPL-CCNC: 50 IU/L (ref 0–40)
B BURGDOR IGG+IGM SER-ACNC: <0.91 ISR (ref 0–0.9)
BASOPHILS # BLD AUTO: 0 X10E3/UL (ref 0–0.2)
BASOPHILS NFR BLD AUTO: 1 %
BILIRUB SERPL-MCNC: 0.6 MG/DL (ref 0–1.2)
BUN SERPL-MCNC: 7 MG/DL (ref 6–20)
BUN/CREAT SERPL: 7 (ref 9–20)
C-ANCA TITR SER IF: ABNORMAL TITER
C3 SERPL-MCNC: 211 MG/DL (ref 82–167)
C4 SERPL-MCNC: 34 MG/DL (ref 14–44)
CALCIUM SERPL-MCNC: 9.5 MG/DL (ref 8.7–10.2)
CHLORIDE SERPL-SCNC: 101 MMOL/L (ref 96–106)
CO2 SERPL-SCNC: 22 MMOL/L (ref 20–29)
CREAT SERPL-MCNC: 0.94 MG/DL (ref 0.76–1.27)
CRP SERPL-MCNC: 7.2 MG/L (ref 0–4.9)
EOSINOPHIL # BLD AUTO: 0.3 X10E3/UL (ref 0–0.4)
EOSINOPHIL NFR BLD AUTO: 4 %
ERYTHROCYTE [DISTWIDTH] IN BLOOD BY AUTOMATED COUNT: 14.3 % (ref 12.3–15.4)
ERYTHROCYTE [SEDIMENTATION RATE] IN BLOOD BY WESTERGREN METHOD: 26 MM/HR (ref 0–15)
GAMMA INTERFERON BACKGROUND BLD IA-ACNC: 0.03 IU/ML
GLOBULIN SER CALC-MCNC: 3 G/DL (ref 1.5–4.5)
GLUCOSE SERPL-MCNC: 118 MG/DL (ref 65–99)
HCT VFR BLD AUTO: 41.4 % (ref 37.5–51)
HGB BLD-MCNC: 14.4 G/DL (ref 13–17.7)
HLA-B27 QL NAA+PROBE: NEGATIVE
IMM GRANULOCYTES # BLD AUTO: 0 X10E3/UL (ref 0–0.1)
IMM GRANULOCYTES NFR BLD AUTO: 0 %
LYMPHOCYTES # BLD AUTO: 2.4 X10E3/UL (ref 0.7–3.1)
LYMPHOCYTES NFR BLD AUTO: 33 %
M TB IFN-G BLD-IMP: NEGATIVE
M TB IFN-G CD4+ BCKGRND COR BLD-ACNC: 0.16 IU/ML
MCH RBC QN AUTO: 27 PG (ref 26.6–33)
MCHC RBC AUTO-ENTMCNC: 34.8 G/DL (ref 31.5–35.7)
MCV RBC AUTO: 78 FL (ref 79–97)
MITOGEN IGNF BLD-ACNC: >10 IU/ML
MONOCYTES # BLD AUTO: 0.6 X10E3/UL (ref 0.1–0.9)
MONOCYTES NFR BLD AUTO: 8 %
MYELOPEROXIDASE AB SER IA-ACNC: <9 U/ML (ref 0–9)
NEUTROPHILS # BLD AUTO: 3.9 X10E3/UL (ref 1.4–7)
NEUTROPHILS NFR BLD AUTO: 54 %
P-ANCA ATYPICAL TITR SER IF: ABNORMAL TITER
P-ANCA TITR SER IF: ABNORMAL TITER
PLATELET # BLD AUTO: 255 X10E3/UL (ref 150–450)
POTASSIUM SERPL-SCNC: 4 MMOL/L (ref 3.5–5.2)
PROT SERPL-MCNC: 7.1 G/DL (ref 6–8.5)
PROTEINASE3 AB SER IA-ACNC: <3.5 U/ML (ref 0–3.5)
QUANTIFERON INCUBATION: NORMAL
QUANTIFERON TB2 AG VALUE: 0.07 IU/ML
RBC # BLD AUTO: 5.33 X10E6/UL (ref 4.14–5.8)
SERVICE CMNT-IMP: NORMAL
SODIUM SERPL-SCNC: 139 MMOL/L (ref 134–144)
WBC # BLD AUTO: 7.3 X10E3/UL (ref 3.4–10.8)

## 2019-08-08 ENCOUNTER — OFFICE VISIT (OUTPATIENT)
Dept: FAMILY MEDICINE CLINIC | Facility: CLINIC | Age: 34
End: 2019-08-08

## 2019-08-08 VITALS
SYSTOLIC BLOOD PRESSURE: 140 MMHG | OXYGEN SATURATION: 98 % | HEART RATE: 81 BPM | DIASTOLIC BLOOD PRESSURE: 90 MMHG | TEMPERATURE: 98.2 F | WEIGHT: 315 LBS | HEIGHT: 72 IN | BODY MASS INDEX: 42.66 KG/M2

## 2019-08-08 DIAGNOSIS — R35.0 FREQUENT URINATION: ICD-10-CM

## 2019-08-08 DIAGNOSIS — E11.9 TYPE 2 DIABETES MELLITUS WITHOUT COMPLICATION, WITHOUT LONG-TERM CURRENT USE OF INSULIN (HCC): ICD-10-CM

## 2019-08-08 DIAGNOSIS — R63.1 POLYDIPSIA: ICD-10-CM

## 2019-08-08 DIAGNOSIS — R35.89 POLYURIA: ICD-10-CM

## 2019-08-08 DIAGNOSIS — R63.2 POLYPHAGIA: Primary | ICD-10-CM

## 2019-08-08 DIAGNOSIS — R30.0 DYSURIA: ICD-10-CM

## 2019-08-08 LAB
BILIRUB BLD-MCNC: NEGATIVE MG/DL
CLARITY, POC: CLEAR
COLOR UR: YELLOW
GLUCOSE BLDC GLUCOMTR-MCNC: 512 MG/DL (ref 70–130)
GLUCOSE UR STRIP-MCNC: ABNORMAL MG/DL
KETONES UR QL: NEGATIVE
LEUKOCYTE EST, POC: NEGATIVE
NITRITE UR-MCNC: NEGATIVE MG/ML
PH UR: 6 [PH] (ref 5–8)
PROT UR STRIP-MCNC: NEGATIVE MG/DL
RBC # UR STRIP: NEGATIVE /UL
SP GR UR: 1 (ref 1–1.03)
UROBILINOGEN UR QL: NORMAL

## 2019-08-08 PROCEDURE — 99214 OFFICE O/P EST MOD 30 MIN: CPT | Performed by: NURSE PRACTITIONER

## 2019-08-08 PROCEDURE — 82947 ASSAY GLUCOSE BLOOD QUANT: CPT | Performed by: NURSE PRACTITIONER

## 2019-08-08 RX ORDER — BRIMONIDINE TARTRATE 2 MG/ML
SOLUTION/ DROPS OPHTHALMIC
COMMUNITY
Start: 2019-06-30 | End: 2020-02-07 | Stop reason: DRUGHIGH

## 2019-08-08 RX ORDER — FLUOROMETHOLONE 0.1 %
SUSPENSION, DROPS(FINAL DOSAGE FORM)(ML) OPHTHALMIC (EYE)
COMMUNITY
Start: 2019-06-30

## 2019-08-08 RX ORDER — BRIMONIDINE TARTRATE 0.1 %
DROPS OPHTHALMIC (EYE)
COMMUNITY
Start: 2019-06-30 | End: 2022-05-31 | Stop reason: ALTCHOICE

## 2019-08-08 RX ORDER — PREDNISONE 20 MG/1
TABLET ORAL
COMMUNITY
Start: 2019-05-31 | End: 2020-02-07

## 2019-08-08 NOTE — PROGRESS NOTES
Subjective     Josiah Loomis is a 34 y.o. male who presents with   Chief Complaint   Patient presents with   • Urinary Frequency       Excessive thirst drinks about 3L to several gallons a day and still thirsty. For about 1-4 wks ago and has not changed. Took AZO bladder control on Saturday and again on Monday and Tuesday      Urinary Frequency    This is a new problem. The current episode started 1 to 4 weeks ago. The problem occurs every urination. The problem has been gradually worsening. The patient is experiencing no pain. There has been no fever. He is sexually active. Associated symptoms include frequency and urgency. Pertinent negatives include no chills, discharge, flank pain, hesitancy or vomiting. He has tried nothing for the symptoms.        Review of Systems   Constitutional: Negative for activity change, chills and fever.   Gastrointestinal: Negative for abdominal distention, abdominal pain and vomiting.   Endocrine: Positive for polydipsia, polyphagia and polyuria.   Genitourinary: Positive for frequency and urgency. Negative for difficulty urinating, dysuria, flank pain, genital sores and hesitancy.   Psychiatric/Behavioral: Negative.    All other systems reviewed and are negative.        The following portions of the patient's history were reviewed and updated as appropriate: allergies, current medications, past family history, past medical history, past social history, past surgical history and problem list.      Patient Active Problem List    Diagnosis Date Noted   • Morbidly obese (CMS/[facility]) 11/16/2018   • Sprain of acromioclavicular ligament 05/26/2016   • Atopic dermatitis 05/26/2016   • Dyslipidemia 05/26/2016   • Abdominal hernia 05/26/2016   • Prediabetes 05/26/2016       Current Outpatient Medications on File Prior to Visit   Medication Sig Dispense Refill   • ALPHAGAN P 0.1 % solution ophthalmic solution      • brimonidine (ALPHAGAN) 0.2 % ophthalmic solution      • fluorometholone (FML)  "0.1 % ophthalmic suspension      • predniSONE (DELTASONE) 20 MG tablet      • [DISCONTINUED] methylPREDNISolone (MEDROL, MICHAEL,) 4 MG tablet Take as directed on package instructions. 21 tablet 0     No current facility-administered medications on file prior to visit.        Objective     /90 (BP Location: Left arm, Patient Position: Sitting, Cuff Size: Adult)   Pulse 81   Temp 98.2 °F (36.8 °C) (Oral)   Ht 182.9 cm (72\")   Wt (!) 147 kg (325 lb)   SpO2 98%   BMI 44.08 kg/m²     Physical Exam   Constitutional: He is oriented to person, place, and time. He appears well-developed and well-nourished.   Eyes: Conjunctivae are normal. Pupils are equal, round, and reactive to light.   Neck: Normal range of motion.   Cardiovascular: Normal rate, regular rhythm and normal heart sounds.   Pulmonary/Chest: Effort normal and breath sounds normal. No respiratory distress.   Abdominal: Bowel sounds are normal. He exhibits no distension and no mass. There is no tenderness. There is no rebound and no guarding.   Neurological: He is alert and oriented to person, place, and time. No cranial nerve deficit or sensory deficit.   Skin: Skin is warm and dry.   Psychiatric: He has a normal mood and affect.   Vitals reviewed.      Procedures    Assessment/Plan   Josiah was seen today for urinary frequency.    Diagnoses and all orders for this visit:    Polyphagia    Polydipsia  -     POC Glucose  -     Hemoglobin A1c    Dysuria    Frequent urination  -     POCT urinalysis dipstick, automated    Type 2 diabetes mellitus without complication, without long-term current use of insulin (CMS/[facility])  -     metFORMIN (GLUCOPHAGE) 850 MG tablet; Take 1 tablet by mouth Daily With Breakfast.  -     insulin lispro (humaLOG) injection 14 Units  -     CBC & Differential  -     Comprehensive metabolic panel; Future        Discussion Fasting glucose 544 and 514. Patient has been trying to loose weight and exercise. He has lost weight. He was " previously pre-diabetic and the Blood sugars at this time are not controlled. Patient started on metformin. Labs to check GFR. Patient is to follow up in 2-3 wks.  Discussed DM2 disease process and medications. Diabetes education teaching. Self monitoring and care. Diet and exercise and lifestyle modification.     Patient education given on DM2 and the patient expresses understanding and acceptance of instructions. Valerie Gutierrez, ISAIAH 8/8/2019 11:03 AM    No future appointments.

## 2019-08-09 LAB
BASOPHILS # BLD AUTO: 0.03 10*3/MM3 (ref 0–0.2)
BASOPHILS NFR BLD AUTO: 0.4 % (ref 0–1.5)
EOSINOPHIL # BLD AUTO: 0.03 10*3/MM3 (ref 0–0.4)
EOSINOPHIL NFR BLD AUTO: 0.4 % (ref 0.3–6.2)
ERYTHROCYTE [DISTWIDTH] IN BLOOD BY AUTOMATED COUNT: 13.7 % (ref 12.3–15.4)
HCT VFR BLD AUTO: 47.7 % (ref 37.5–51)
HGB BLD-MCNC: 14.8 G/DL (ref 13–17.7)
IMM GRANULOCYTES # BLD AUTO: 0.03 10*3/MM3 (ref 0–0.05)
IMM GRANULOCYTES NFR BLD AUTO: 0.4 % (ref 0–0.5)
LYMPHOCYTES # BLD AUTO: 0.76 10*3/MM3 (ref 0.7–3.1)
LYMPHOCYTES NFR BLD AUTO: 9.5 % (ref 19.6–45.3)
MCH RBC QN AUTO: 25.4 PG (ref 26.6–33)
MCHC RBC AUTO-ENTMCNC: 31 G/DL (ref 31.5–35.7)
MCV RBC AUTO: 82 FL (ref 79–97)
MONOCYTES # BLD AUTO: 0.2 10*3/MM3 (ref 0.1–0.9)
MONOCYTES NFR BLD AUTO: 2.5 % (ref 5–12)
NEUTROPHILS # BLD AUTO: 6.92 10*3/MM3 (ref 1.7–7)
NEUTROPHILS NFR BLD AUTO: 86.8 % (ref 42.7–76)
NRBC BLD AUTO-RTO: 0 /100 WBC (ref 0–0.2)
PLATELET # BLD AUTO: 249 10*3/MM3 (ref 140–450)
RBC # BLD AUTO: 5.82 10*6/MM3 (ref 4.14–5.8)
WBC # BLD AUTO: 7.97 10*3/MM3 (ref 3.4–10.8)

## 2019-08-10 LAB — HBA1C MFR BLD: 13.2 % (ref 4.8–5.6)

## 2019-08-12 NOTE — PROGRESS NOTES
HGA1C is 13.2 continue all medication as directed. How are blood sugars at home? Below 200 fasting ? Did he make follow up appt ?

## 2019-08-13 ENCOUNTER — RESULTS ENCOUNTER (OUTPATIENT)
Dept: FAMILY MEDICINE CLINIC | Facility: CLINIC | Age: 34
End: 2019-08-13

## 2019-08-13 DIAGNOSIS — E11.9 TYPE 2 DIABETES MELLITUS WITHOUT COMPLICATION, WITHOUT LONG-TERM CURRENT USE OF INSULIN (HCC): ICD-10-CM

## 2019-08-13 RX ORDER — PEN NEEDLE, DIABETIC 30 GX3/16"
1 NEEDLE, DISPOSABLE MISCELLANEOUS 4 TIMES DAILY
Qty: 400 EACH | Refills: 5 | Status: SHIPPED | OUTPATIENT
Start: 2019-08-13 | End: 2019-08-14 | Stop reason: SDUPTHER

## 2019-08-14 ENCOUNTER — TELEPHONE (OUTPATIENT)
Dept: FAMILY MEDICINE CLINIC | Facility: CLINIC | Age: 34
End: 2019-08-14

## 2019-08-14 RX ORDER — GLIPIZIDE AND METFORMIN HCL 2.5; 5 MG/1; MG/1
2 TABLET, FILM COATED ORAL DAILY
Qty: 42 TABLET | Refills: 0 | Status: SHIPPED | OUTPATIENT
Start: 2019-08-14 | End: 2019-08-22

## 2019-08-14 RX ORDER — PEN NEEDLE, DIABETIC 30 GX3/16"
1 NEEDLE, DISPOSABLE MISCELLANEOUS 4 TIMES DAILY
Qty: 400 EACH | Refills: 5 | Status: SHIPPED | OUTPATIENT
Start: 2019-08-14 | End: 2020-09-17

## 2019-08-14 NOTE — TELEPHONE ENCOUNTER
Spoke with patient in regards to metformin causing stomach issues. He said it is no longer causing problems.     His blood sugar checks were...    08/13/19  9pm (300's)    08/14/19  (306) before breakfast  (424) after breakfast     He also has an appoint with Dr. Rosario next week.

## 2019-08-15 ENCOUNTER — TELEPHONE (OUTPATIENT)
Dept: FAMILY MEDICINE CLINIC | Facility: CLINIC | Age: 34
End: 2019-08-15

## 2019-08-15 NOTE — TELEPHONE ENCOUNTER
Dr. Johnson called in Insulin yesterday for patient, Was starting patient on Konbiglyze XR 2.5/1000 daily for one week. Then 5mg/1000 daily. We can discontinue and he can follow up with Dr. Rosario

## 2019-08-22 ENCOUNTER — OFFICE VISIT (OUTPATIENT)
Dept: FAMILY MEDICINE CLINIC | Facility: CLINIC | Age: 34
End: 2019-08-22

## 2019-08-22 VITALS
DIASTOLIC BLOOD PRESSURE: 80 MMHG | BODY MASS INDEX: 44.76 KG/M2 | HEART RATE: 70 BPM | TEMPERATURE: 97.8 F | SYSTOLIC BLOOD PRESSURE: 136 MMHG | OXYGEN SATURATION: 97 % | WEIGHT: 315 LBS

## 2019-08-22 DIAGNOSIS — Z79.4 TYPE 2 DIABETES MELLITUS WITHOUT COMPLICATION, WITH LONG-TERM CURRENT USE OF INSULIN (HCC): Primary | ICD-10-CM

## 2019-08-22 DIAGNOSIS — E11.9 TYPE 2 DIABETES MELLITUS WITHOUT COMPLICATION, WITH LONG-TERM CURRENT USE OF INSULIN (HCC): Primary | ICD-10-CM

## 2019-08-22 PROCEDURE — 99213 OFFICE O/P EST LOW 20 MIN: CPT | Performed by: FAMILY MEDICINE

## 2019-08-22 RX ORDER — FLASH GLUCOSE SCANNING READER
1 EACH MISCELLANEOUS DAILY
Qty: 2 DEVICE | Refills: 12 | Status: SHIPPED | OUTPATIENT
Start: 2019-08-22 | End: 2022-01-27

## 2019-08-22 NOTE — PROGRESS NOTES
Subjective   Josiah Loomis is a 34 y.o. male. Presents today for   Chief Complaint   Patient presents with   • Diabetes     med check and diabetes out of control.  Labs microalbumin and diabetic foot exam.       Diabetes   He presents for his initial diabetic visit. He has type 2 diabetes mellitus. The initial diagnosis of diabetes was made 1 month ago. His disease course has been improving. Associated symptoms include blurred vision, polydipsia, polyuria and visual change. Pertinent negatives for diabetes include no chest pain, no foot paresthesias and no foot ulcerations. Symptoms are improving. Risk factors for coronary artery disease include diabetes mellitus, dyslipidemia, hypertension, male sex and obesity. Current diabetic treatment includes insulin injections and oral agent (monotherapy). He is compliant with treatment most of the time. He is following a generally healthy diet. An ACE inhibitor/angiotensin II receptor blocker is not being taken. Eye exam is current.   a1c 2 weeks ago over 13;   BS trended down to 250s on insuln.    Review of Systems   Eyes: Positive for blurred vision.   Cardiovascular: Negative for chest pain, palpitations and leg swelling.   Gastrointestinal: Negative for abdominal pain, nausea and vomiting.   Endocrine: Positive for polydipsia and polyuria.       Patient Active Problem List   Diagnosis   • Sprain of acromioclavicular ligament   • Atopic dermatitis   • Dyslipidemia   • Abdominal hernia   • Prediabetes   • Morbidly obese (CMS/[facility])       Social History     Socioeconomic History   • Marital status:      Spouse name: Not on file   • Number of children: Not on file   • Years of education: Not on file   • Highest education level: Not on file   Tobacco Use   • Smoking status: Never Smoker   • Smokeless tobacco: Never Used   Substance and Sexual Activity   • Alcohol use: Yes     Comment: rarely   • Drug use: No   • Sexual activity: Defer       No Known Allergies    Current  Outpatient Medications on File Prior to Visit   Medication Sig Dispense Refill   • ALPHAGAN P 0.1 % solution ophthalmic solution      • brimonidine (ALPHAGAN) 0.2 % ophthalmic solution      • fluorometholone (FML) 0.1 % ophthalmic suspension      • glipiZIDE-metFORMIN (METAGLIP) 2.5-500 MG per tablet Take 2 tablets by mouth Daily. For one week. Then increase to 2 tabs at breakfast daily 42 tablet 0   • insulin aspart (NOVOLOG FLEXPEN) 100 UNIT/ML solution pen-injector sc pen Check 3 times a day with meals +SS - -200 +3u, 201-250 +6u, 251-300 + 9u, 301-350 +12u, >351 +12u 15 mL 5   • insulin degludec (TRESIBA FLEXTOUCH) 100 UNIT/ML solution pen-injector injection Inject 10 Units under the skin into the appropriate area as directed Every Night. Dx.E11.9 15 mL 5   • Insulin Pen Needle (PEN NEEDLES) 32G X 4 MM misc 1 Device 4 (Four) Times a Day. Dx. E11.9 400 each 5   • predniSONE (DELTASONE) 20 MG tablet        No current facility-administered medications on file prior to visit.        Objective   Vitals:    08/22/19 0829   BP: 136/80   Pulse: 70   Temp: 97.8 °F (36.6 °C)   SpO2: 97%   Weight: (!) 150 kg (330 lb)       Physical Exam   Constitutional: He appears well-developed and well-nourished.   HENT:   Head: Normocephalic and atraumatic.   Neck: Neck supple. No JVD present. No thyromegaly present.   Cardiovascular: Normal rate, regular rhythm and normal heart sounds. Exam reveals no gallop and no friction rub.   No murmur heard.  Pulmonary/Chest: Effort normal and breath sounds normal. No respiratory distress. He has no wheezes. He has no rales.   Abdominal: Soft. Bowel sounds are normal. He exhibits no distension. There is no tenderness. There is no rebound and no guarding.   Musculoskeletal: He exhibits no edema.    Josiah had a diabetic foot exam performed (see scanned report) today.   During the foot exam he had a monofilament test performed.  Neurological: He is alert.   Skin: Skin is warm and dry.    Psychiatric: He has a normal mood and affect. His behavior is normal.   Nursing note and vitals reviewed.    MAL/Cr ordered but unable to give specimen  Hemoglobin A1c   Order: [id number]   Status:  Final result   Visible to patient:  Yes (MyChart) Dx:  Polydipsia   Component  Ref Range & Units 2wk ago   Hemoglobin A1C  4.80 - 5.60 % 13.20 Abnormally high     Comment: Hemoglobin A1C Ranges:   Increased Risk for Diabetes  5.7% to 6.4%   Diabetes                     >= 6.5%   Diabetic Goal                < 7.0%              Assessment/Plan   Josiah was seen today for diabetes.    Diagnoses and all orders for this visit:    Type 2 diabetes mellitus without complication, with long-term current use of insulin (CMS/[facility])  -     POCT microalbumin  -     metFORMIN (GLUCOPHAGE) 500 MG tablet; Take 1 tablet by mouth 2 (Two) Times a Day With Meals.  -     Semaglutide (OZEMPIC) 1 MG/DOSE solution pen-injector; Inject 1 mg under the skin into the appropriate area as directed 1 (One) Time Per Week.  -     Continuous Blood Gluc  (FREESTYLE LINDA 14 DAY READER) device; 1 applicator Daily.    would benefit from continuous glucose monitor to help teach blood sugar trends and relation to diet as severely uncontrolled, recent a1c over 13%.  We discussed non-insulin regimens and started insulin to obtain rapid control.  I will start metformin and ozempic, d/w risks and side effects.  Will continue long acting insulin, ssi hopefully temporary but will have continue for now for higher blood sugar readings as previously recommended.    Patient to call if any blood sugars over 200 to further titrate.         -Follow up: 2 months and prn

## 2019-09-12 ENCOUNTER — APPOINTMENT (OUTPATIENT)
Dept: LAB | Facility: HOSPITAL | Age: 34
End: 2019-09-12

## 2019-09-12 ENCOUNTER — HOSPITAL ENCOUNTER (EMERGENCY)
Facility: HOSPITAL | Age: 34
Discharge: HOME | End: 2019-09-12
Attending: EMERGENCY MEDICINE | Admitting: EMERGENCY MEDICINE

## 2019-09-12 ENCOUNTER — TRANSCRIBE ORDERS (OUTPATIENT)
Dept: ADMINISTRATIVE | Facility: HOSPITAL | Age: 34
End: 2019-09-12

## 2019-09-12 ENCOUNTER — TELEPHONE (OUTPATIENT)
Dept: FAMILY MEDICINE CLINIC | Facility: CLINIC | Age: 34
End: 2019-09-12

## 2019-09-12 VITALS
TEMPERATURE: 99.4 F | RESPIRATION RATE: 18 BRPM | SYSTOLIC BLOOD PRESSURE: 163 MMHG | OXYGEN SATURATION: 98 % | HEIGHT: 72 IN | WEIGHT: 315 LBS | BODY MASS INDEX: 42.66 KG/M2 | DIASTOLIC BLOOD PRESSURE: 84 MMHG | HEART RATE: 91 BPM

## 2019-09-12 DIAGNOSIS — H20.9 UVEITIS: Primary | ICD-10-CM

## 2019-09-12 DIAGNOSIS — Z20.2 EXPOSURE TO STD: Primary | ICD-10-CM

## 2019-09-12 DIAGNOSIS — L02.211 CUTANEOUS ABSCESS OF ABDOMINAL WALL: Primary | ICD-10-CM

## 2019-09-12 PROCEDURE — 87491 CHLMYD TRACH DNA AMP PROBE: CPT | Performed by: FAMILY MEDICINE

## 2019-09-12 PROCEDURE — 99283 EMERGENCY DEPT VISIT LOW MDM: CPT

## 2019-09-12 PROCEDURE — 87661 TRICHOMONAS VAGINALIS AMPLIF: CPT | Performed by: FAMILY MEDICINE

## 2019-09-12 PROCEDURE — 87591 N.GONORRHOEAE DNA AMP PROB: CPT | Performed by: FAMILY MEDICINE

## 2019-09-12 RX ORDER — CEPHALEXIN 500 MG/1
500 CAPSULE ORAL 3 TIMES DAILY
Qty: 21 CAPSULE | Refills: 0 | Status: SHIPPED | OUTPATIENT
Start: 2019-09-12 | End: 2020-02-07

## 2019-09-12 RX ORDER — LIDOCAINE HYDROCHLORIDE AND EPINEPHRINE 10; 10 MG/ML; UG/ML
10 INJECTION, SOLUTION INFILTRATION; PERINEURAL ONCE
Status: COMPLETED | OUTPATIENT
Start: 2019-09-12 | End: 2019-09-12

## 2019-09-12 RX ADMIN — LIDOCAINE HYDROCHLORIDE AND EPINEPHRINE 10 ML: 10; 10 INJECTION, SOLUTION INFILTRATION; PERINEURAL at 17:53

## 2019-09-12 NOTE — ED PROVIDER NOTES
EMERGENCY DEPARTMENT ENCOUNTER    CHIEF COMPLAINT  Chief Complaint: abscess in umbilical region  History given by: patient  History limited by: none  Room Number: 03/03  PMD: Kodi Rosario DO      HPI:  Pt is a 34 y.o. male who presents complaining of an umbilical abscess with pain in the area that began about 3 days ago. Pt states that he has had Hx of similar episode 6 years ago. Pt affirms subjective fever. Pt affirms Hx of DM.    Duration:  3 days ago  Onset: gradual  Timing: constant  Location: per-umbilical  Radiation: none  Quality: abscess  Intensity/Severity: mild  Progression: unchanged  Associated Symptoms: none  Aggravating Factors: none  Alleviating Factors: none  Previous Episodes: Pt states he had similar episode 6 years ago  Treatment before arrival: none    PAST MEDICAL HISTORY  Active Ambulatory Problems     Diagnosis Date Noted   • Sprain of acromioclavicular ligament 05/26/2016   • Atopic dermatitis 05/26/2016   • Dyslipidemia 05/26/2016   • Abdominal hernia 05/26/2016   • Prediabetes 05/26/2016   • Morbidly obese (CMS/[facility]) 11/16/2018   • Type 2 diabetes mellitus without complication, with long-term current use of insulin (CMS/[facility]) 08/22/2019     Resolved Ambulatory Problems     Diagnosis Date Noted   • Benign essential hypertension 05/26/2016     Past Medical History:   Diagnosis Date   • GERD (gastroesophageal reflux disease)    • Hyperlipidemia    • Hypertension        PAST SURGICAL HISTORY  History reviewed. No pertinent surgical history.    FAMILY HISTORY  No family history on file.    SOCIAL HISTORY  Social History     Socioeconomic History   • Marital status:      Spouse name: Not on file   • Number of children: Not on file   • Years of education: Not on file   • Highest education level: Not on file   Tobacco Use   • Smoking status: Never Smoker   • Smokeless tobacco: Never Used   Substance and Sexual Activity   • Alcohol use: Yes     Comment: rarely   • Drug use: No   •  "Sexual activity: Defer       ALLERGIES  Patient has no known allergies.    REVIEW OF SYSTEMS  Review of Systems   Constitutional: Negative for fever.   Respiratory: Negative for shortness of breath.    Cardiovascular: Negative for chest pain.   Skin:        \"abscess\" in umbilical region       PHYSICAL EXAM  ED Triage Vitals [09/12/19 1557]   Temp Heart Rate Resp BP SpO2   99.4 °F (37.4 °C) 103 18 -- 98 %      Temp src Heart Rate Source Patient Position BP Location FiO2 (%)   Tympanic Monitor -- -- --       Physical Exam   Constitutional: He is well-developed, well-nourished, and in no distress. No distress.   HENT:   Head: Normocephalic and atraumatic.   Eyes: EOM are normal.   Neck: Normal range of motion.   Pulmonary/Chest: No respiratory distress.   Abdominal: There is no tenderness.   Musculoskeletal: He exhibits no edema.   Neurological: He is alert.   Skin: Skin is warm and dry.   in umbilicus, 1 cm area of erythema, fluctuance, and tenderness consistant with an abscess     Nursing note and vitals reviewed.      LAB RESULTS  Lab Results (last 24 hours)     ** No results found for the last 24 hours. **          I ordered the above labs and reviewed the results    RADIOLOGY  No orders to display        I ordered the above noted radiological studies. Interpreted by radiologist. Discussed with radiologist. Reviewed by me in PACS.       PROCEDURES  Incision & Drainage  Date/Time: 9/12/2019 5:35 PM  Performed by: Miguel Cochran MD  Authorized by: Miguel Cochran MD     Consent:     Consent obtained:  Verbal    Consent given by:  Patient    Risks discussed:  Bleeding, incomplete drainage, pain and infection  Location:     Type:  Abscess    Size:  1x1cm    Location:  Trunk (in umbilicus)    Trunk location:  Abdomen  Pre-procedure details:     Skin preparation:  Betadine  Anesthesia (see MAR for exact dosages):     Anesthesia method:  Local infiltration    Local anesthetic:  Lidocaine 1% WITH epi  Procedure " type:     Complexity:  Simple  Procedure details:     Needle aspiration: no      Incision types:  Stab incision    Incision depth:  Subcutaneous    Scalpel blade:  11    Wound management:  Irrigated with saline    Drainage:  Purulent    Drainage amount:  Scant    Wound treatment:  Wound left open    Packing materials:  None  Post-procedure details:     Patient tolerance of procedure:  Tolerated well, no immediate complications          PROGRESS AND CONSULTS   2218: Pt rechecked and resting comfortably. Discussed plan for discharge after I&D with Abx prescription. Pt understands and agrees with the plan, all questions answered.      MEDICAL DECISION MAKING  Results were reviewed/discussed with the patient and they were also made aware of online access. Pt also made aware that some labs, such as cultures, will not be resulted during ER visit and follow up with PMD is necessary.     MDM       DIAGNOSIS  Final diagnoses:   Cutaneous abscess of abdominal wall       DISPOSITION  DISCHARGE    Patient discharged in stable condition.    Reviewed implications of results, diagnosis, meds, responsibility to follow up, warning signs and symptoms of possible worsening, potential complications and reasons to return to ER.    Patient/Family voiced understanding of above instructions.    Discussed plan for discharge, as there is no emergent indication for admission. Patient referred to primary care provider for BP management due to today's BP. Pt/family is agreeable and understands need for follow up and repeat testing.  Pt is aware that discharge does not mean that nothing is wrong but it indicates no emergency is present that requires admission and they must continue care with follow-up as given below or physician of their choice.     FOLLOW-UP  Kodi Rosario,   [id number]  [address] [id number] [phone]    Go to   If symptoms persist         Medication List      New Prescriptions    cephalexin 500 MG  capsule  Commonly known as:  KEFLEX  Take 1 capsule by mouth 3 (Three) Times a Day.              Latest Documented Vital Signs:  As of 2:08 PM  BP- 163/84 HR- 91 Temp- 99.4 °F (37.4 °C) (Tympanic) O2 sat- 98%    --  Documentation assistance provided by mirna Vanegas for Dr. Cochran.  Information recorded by the scribe was done at my direction and has been verified and validated by me.        Stephanie Vanegas  09/12/19 [id number]       Miguel Cochran MD  09/12/19 [id number]       Miguel Cochran MD  09/17/19 9590

## 2019-09-12 NOTE — DISCHARGE INSTRUCTIONS
Take antibiotic as prescribed.  Apply warm compresses and/or soak area in warm water several times daily for the next several days.  Return to the emergency department for fever, chills, increased redness/swelling, or other concern.

## 2019-09-12 NOTE — TELEPHONE ENCOUNTER
Left message for pt to call back with what brand of test strips he needs and also if I need to send in monitor also. He is also supposed to confirm pharmacy.

## 2019-09-16 LAB
C TRACH RRNA SPEC DONR QL NAA+PROBE: NEGATIVE
GNRH SERPL-MCNC: NEGATIVE
T VAGINALIS RRNA GENITAL QL PROBE: NEGATIVE

## 2020-02-07 ENCOUNTER — OFFICE VISIT (OUTPATIENT)
Dept: FAMILY MEDICINE CLINIC | Facility: CLINIC | Age: 35
End: 2020-02-07

## 2020-02-07 VITALS
DIASTOLIC BLOOD PRESSURE: 68 MMHG | HEIGHT: 72 IN | BODY MASS INDEX: 42.66 KG/M2 | SYSTOLIC BLOOD PRESSURE: 124 MMHG | WEIGHT: 315 LBS | HEART RATE: 77 BPM | OXYGEN SATURATION: 96 %

## 2020-02-07 DIAGNOSIS — H20.9 UVEITIS: ICD-10-CM

## 2020-02-07 DIAGNOSIS — M25.511 ACUTE PAIN OF RIGHT SHOULDER: Primary | ICD-10-CM

## 2020-02-07 DIAGNOSIS — Z79.4 TYPE 2 DIABETES MELLITUS WITHOUT COMPLICATION, WITH LONG-TERM CURRENT USE OF INSULIN (HCC): ICD-10-CM

## 2020-02-07 DIAGNOSIS — E11.9 TYPE 2 DIABETES MELLITUS WITHOUT COMPLICATION, WITH LONG-TERM CURRENT USE OF INSULIN (HCC): ICD-10-CM

## 2020-02-07 LAB
ALBUMIN SERPL-MCNC: 4.3 G/DL (ref 3.5–5.2)
ALBUMIN/GLOB SERPL: 1.5 G/DL
ALP SERPL-CCNC: 254 U/L (ref 39–117)
ALT SERPL-CCNC: 76 U/L (ref 1–41)
AST SERPL-CCNC: 48 U/L (ref 1–40)
BILIRUB SERPL-MCNC: 0.6 MG/DL (ref 0.2–1.2)
BUN SERPL-MCNC: 9 MG/DL (ref 6–20)
BUN/CREAT SERPL: 10 (ref 7–25)
CALCIUM SERPL-MCNC: 9.8 MG/DL (ref 8.6–10.5)
CHLORIDE SERPL-SCNC: 98 MMOL/L (ref 98–107)
CHOLEST SERPL-MCNC: 300 MG/DL (ref 0–200)
CO2 SERPL-SCNC: 25.6 MMOL/L (ref 22–29)
CREAT SERPL-MCNC: 0.9 MG/DL (ref 0.76–1.27)
GLOBULIN SER CALC-MCNC: 2.8 GM/DL
GLUCOSE SERPL-MCNC: 139 MG/DL (ref 65–99)
HBA1C MFR BLD: 8.1 % (ref 4.8–5.6)
HDLC SERPL-MCNC: 62 MG/DL (ref 40–60)
LDLC SERPL CALC-MCNC: 214 MG/DL (ref 0–100)
POTASSIUM SERPL-SCNC: 4.4 MMOL/L (ref 3.5–5.2)
PROT SERPL-MCNC: 7.1 G/DL (ref 6–8.5)
SODIUM SERPL-SCNC: 136 MMOL/L (ref 136–145)
TRIGL SERPL-MCNC: 121 MG/DL (ref 0–150)
VLDLC SERPL CALC-MCNC: 24.2 MG/DL (ref 5–40)

## 2020-02-07 PROCEDURE — 96372 THER/PROPH/DIAG INJ SC/IM: CPT | Performed by: FAMILY MEDICINE

## 2020-02-07 PROCEDURE — 99214 OFFICE O/P EST MOD 30 MIN: CPT | Performed by: FAMILY MEDICINE

## 2020-02-07 RX ORDER — METHYLPREDNISOLONE ACETATE 80 MG/ML
80 INJECTION, SUSPENSION INTRA-ARTICULAR; INTRALESIONAL; INTRAMUSCULAR; SOFT TISSUE ONCE
Status: COMPLETED | OUTPATIENT
Start: 2020-02-07 | End: 2020-02-07

## 2020-02-07 RX ADMIN — METHYLPREDNISOLONE ACETATE 80 MG: 80 INJECTION, SUSPENSION INTRA-ARTICULAR; INTRALESIONAL; INTRAMUSCULAR; SOFT TISSUE at 10:52

## 2020-02-07 NOTE — PROGRESS NOTES
Subjective   Josiah Loomis is a 35 y.o. male. Presents today for   Chief Complaint   Patient presents with   • Shoulder Pain     right       Arm Pain    Incident onset: Patient right shoudler pain, had injury 5 years ago after caught fuel line;  Had done better but [place] a lot of heavy lifting and aggravated it, but no specific injury. The incident occurred at work. Injury mechanism: as above. The pain is present in the right shoulder. The quality of the pain is described as aching and cramping. The pain does not radiate. The pain is moderate. The pain has been fluctuating since the incident. Pertinent negatives include no chest pain, numbness or tingling. The symptoms are aggravated by lifting and movement. He has tried NSAIDs for the symptoms. The treatment provided mild relief.   Diabetes   He presents for his follow-up diabetic visit. He has type 2 diabetes mellitus. His disease course has been improving. Pertinent negatives for diabetes include no chest pain, no foot paresthesias and no foot ulcerations. Symptoms are improving. Pertinent negatives for diabetic complications include no CVA, heart disease, impotence or nephropathy. Risk factors for coronary artery disease include diabetes mellitus, dyslipidemia, hypertension, male sex and obesity. Current diabetic treatment includes insulin injections and diet (ran out of ozempic but was covered). He is compliant with treatment most of the time. He is following a diabetic diet. An ACE inhibitor/angiotensin II receptor blocker is not being taken.       Review of Systems   Cardiovascular: Negative for chest pain.   Genitourinary: Negative for impotence.   Neurological: Negative for tingling and numbness.       Patient Active Problem List   Diagnosis   • Sprain of acromioclavicular ligament   • Atopic dermatitis   • Dyslipidemia   • Abdominal hernia   • Prediabetes   • Morbidly obese (CMS/[facility])   • Type 2 diabetes mellitus without  "complication, with long-term current use of insulin (CMS/[facility])       Social History     Socioeconomic History   • Marital status:      Spouse name: Not on file   • Number of children: Not on file   • Years of education: Not on file   • Highest education level: Not on file   Tobacco Use   • Smoking status: Never Smoker   • Smokeless tobacco: Never Used   Substance and Sexual Activity   • Alcohol use: Yes     Comment: rarely   • Drug use: No   • Sexual activity: Defer       No Known Allergies    Current Outpatient Medications on File Prior to Visit   Medication Sig Dispense Refill   • ALPHAGAN P 0.1 % solution ophthalmic solution      • Continuous Blood Gluc  (FREESTYLE LINDA 14 DAY READER) device 1 applicator Daily. 2 Device 12   • fluorometholone (FML) 0.1 % ophthalmic suspension      • glucose blood (ACCU-CHEK GUIDE) test strip 1 each by Other route 4 (Four) Times a Day. Use as instructed. Dx. E11.9 400 each 12   • insulin aspart (NOVOLOG FLEXPEN) 100 UNIT/ML solution pen-injector sc pen Check 3 times a day with meals +SS - -200 +3u, 201-250 +6u, 251-300 + 9u, 301-350 +12u, >351 +12u 15 mL 5   • insulin degludec (TRESIBA FLEXTOUCH) 100 UNIT/ML solution pen-injector injection Inject 10 Units under the skin into the appropriate area as directed Every Night. Dx.E11.9 15 mL 5   • Insulin Pen Needle (PEN NEEDLES) 32G X 4 MM misc 1 Device 4 (Four) Times a Day. Dx. E11.9 400 each 5   • Lancets 30G misc 1 Device 4 (Four) Times a Day. Dx. E11.9 400 each 12     No current facility-administered medications on file prior to visit.        Objective   Vitals:    02/07/20 0940   BP: 124/68   BP Location: Left arm  Comment: forearm   Patient Position: Sitting   Cuff Size: Large Adult   Pulse: 77   SpO2: 96%   Weight: (!) 158 kg (349 lb)   Height: 182.9 cm (72\")       Physical Exam   Constitutional: He appears well-developed and well-nourished.   HENT:   Head: Normocephalic and atraumatic.   Neck: Neck supple. " No JVD present. No thyromegaly present.   Cardiovascular: Normal rate, regular rhythm and normal heart sounds. Exam reveals no gallop and no friction rub.   No murmur heard.  Pulmonary/Chest: Effort normal and breath sounds normal. No respiratory distress. He has no wheezes. He has no rales.   Abdominal: Soft. Bowel sounds are normal. He exhibits no distension. There is no tenderness. There is no rebound and no guarding.   Musculoskeletal: He exhibits no edema.        Right shoulder: He exhibits tenderness and pain. He exhibits normal range of motion, no deformity, no spasm and normal strength.   Negative drop arm test.   Neurological: He is alert.   Skin: Skin is warm and dry.   Psychiatric: He has a normal mood and affect. His behavior is normal.   Nursing note and vitals reviewed.      Assessment/Plan   Josiah was seen today for shoulder pain.    Diagnoses and all orders for this visit:    Acute pain of right shoulder  -     methylPREDNISolone acetate (DEPO-medrol) injection 80 mg  -     diclofenac (VOLTAREN) 50 MG EC tablet; Take 1 tablet by mouth 2 (Two) Times a Day As Needed (shoulder pain).    Type 2 diabetes mellitus without complication, with long-term current use of insulin (CMS/[facility])  -     Comprehensive Metabolic Panel  -     Lipid Panel  -     Hemoglobin A1c  -     Semaglutide, 1 MG/DOSE, (OZEMPIC, 1 MG/DOSE,) 2 MG/1.5ML solution pen-injector; Inject 1 mg under the skin into the appropriate area as directed 1 (One) Time Per Week.    Uveitis  -     Ambulatory Referral to Rheumatology    -patient has had uveitis - serology negative but ophthalmology concerned possible autoimmune issue based on exam;  Will refer.  -dm2 - refill meds and check labs  -shoulder pain - steorid injection as above, watch blood sugars closely;   nsaid as directed;  D/w imaging if no relief.         -Follow up: 3 months and prn

## 2020-02-13 RX ORDER — ROSUVASTATIN CALCIUM 20 MG/1
20 TABLET, COATED ORAL DAILY
Qty: 30 TABLET | Refills: 5 | Status: SHIPPED | OUTPATIENT
Start: 2020-02-13 | End: 2020-10-13

## 2020-02-13 NOTE — PROGRESS NOTES
Diabetes is uncontrolled.  Restart ozempic as discussed  Cholesterol is very high, start rosuvastatin 20mg po daily  Rest of labs normal or stable.

## 2020-03-25 DIAGNOSIS — M25.511 ACUTE PAIN OF RIGHT SHOULDER: ICD-10-CM

## 2020-04-28 ENCOUNTER — TELEPHONE (OUTPATIENT)
Dept: FAMILY MEDICINE CLINIC | Facility: CLINIC | Age: 35
End: 2020-04-28

## 2020-04-28 DIAGNOSIS — A59.9 TRICHOMONIASIS: Primary | ICD-10-CM

## 2020-04-28 NOTE — TELEPHONE ENCOUNTER
PATIENT CALLED AND WOULD LIKE TO GET A CALL BACK IN REGARDS TO HIM POSSIBLY GETTING TESTED FOR TRACHOMATIS PLEASE ADVISE AND GIVE THE PATIENT A CALL BACK [phone].

## 2020-04-30 ENCOUNTER — TELEPHONE (OUTPATIENT)
Dept: FAMILY MEDICINE CLINIC | Facility: CLINIC | Age: 35
End: 2020-04-30

## 2020-04-30 LAB
C TRACH RRNA SPEC QL NAA+PROBE: NEGATIVE
N GONORRHOEA RRNA SPEC QL NAA+PROBE: NEGATIVE
T VAGINALIS DNA SPEC QL NAA+PROBE: NEGATIVE

## 2020-05-06 ENCOUNTER — OFFICE VISIT (OUTPATIENT)
Dept: FAMILY MEDICINE CLINIC | Facility: CLINIC | Age: 35
End: 2020-05-06

## 2020-05-06 VITALS
HEIGHT: 73 IN | HEART RATE: 96 BPM | BODY MASS INDEX: 41.75 KG/M2 | DIASTOLIC BLOOD PRESSURE: 86 MMHG | SYSTOLIC BLOOD PRESSURE: 126 MMHG | OXYGEN SATURATION: 96 % | WEIGHT: 315 LBS

## 2020-05-06 DIAGNOSIS — E66.01 MORBIDLY OBESE (HCC): ICD-10-CM

## 2020-05-06 DIAGNOSIS — M25.511 CHRONIC RIGHT SHOULDER PAIN: ICD-10-CM

## 2020-05-06 DIAGNOSIS — Z79.4 TYPE 2 DIABETES MELLITUS WITHOUT COMPLICATION, WITH LONG-TERM CURRENT USE OF INSULIN (HCC): Primary | ICD-10-CM

## 2020-05-06 DIAGNOSIS — E11.9 TYPE 2 DIABETES MELLITUS WITHOUT COMPLICATION, WITH LONG-TERM CURRENT USE OF INSULIN (HCC): Primary | ICD-10-CM

## 2020-05-06 DIAGNOSIS — K21.9 GASTROESOPHAGEAL REFLUX DISEASE WITHOUT ESOPHAGITIS: ICD-10-CM

## 2020-05-06 DIAGNOSIS — G89.29 CHRONIC RIGHT SHOULDER PAIN: ICD-10-CM

## 2020-05-06 DIAGNOSIS — E78.5 DYSLIPIDEMIA: ICD-10-CM

## 2020-05-06 PROCEDURE — 99214 OFFICE O/P EST MOD 30 MIN: CPT | Performed by: FAMILY MEDICINE

## 2020-05-06 RX ORDER — MELOXICAM 15 MG/1
15 TABLET ORAL DAILY
Qty: 30 TABLET | Refills: 5 | Status: SHIPPED | OUTPATIENT
Start: 2020-05-06 | End: 2020-12-04

## 2020-05-06 RX ORDER — FAMOTIDINE 40 MG/1
40 TABLET, FILM COATED ORAL NIGHTLY
Qty: 30 TABLET | Refills: 5 | Status: SHIPPED | OUTPATIENT
Start: 2020-05-06 | End: 2021-01-07

## 2020-05-06 RX ORDER — METRONIDAZOLE 500 MG/1
500 TABLET ORAL 2 TIMES DAILY
Qty: 14 TABLET | Refills: 0 | Status: SHIPPED | OUTPATIENT
Start: 2020-05-06 | End: 2022-05-31 | Stop reason: ALTCHOICE

## 2020-05-06 NOTE — PROGRESS NOTES
Subjective   Josiah Loomis is a 35 y.o. male. Presents today for   Chief Complaint   Patient presents with   • Hyperlipidemia   • Diabetes   • Shoulder Pain     right       Hyperlipidemia   This is a chronic problem. The current episode started more than 1 year ago. The problem is controlled. Recent lipid tests were reviewed and are normal. Exacerbating diseases include diabetes and obesity. Pertinent negatives include no chest pain or shortness of breath. Current antihyperlipidemic treatment includes statins. The current treatment provides moderate improvement of lipids. There are no compliance problems.  Risk factors for coronary artery disease include diabetes mellitus, dyslipidemia, male sex and obesity.   Diabetes   He presents for his follow-up diabetic visit. He has type 2 diabetes mellitus. His disease course has been stable. Pertinent negatives for diabetes include no chest pain, no foot paresthesias and no foot ulcerations. Symptoms are stable. Risk factors for coronary artery disease include diabetes mellitus, dyslipidemia, male sex and obesity. An ACE inhibitor/angiotensin II receptor blocker is not being taken.   Shoulder Injury    The incident occurred at work. The right shoulder is affected. The incident occurred more than 1 week ago. The injury mechanism was repetitive motion and overhead work (50 lbs hose dropped and arm pulled down). The quality of the pain is described as cramping. The pain is moderate. Pertinent negatives include no chest pain, numbness or tingling. The symptoms are aggravated by movement and overhead lifting. He has tried NSAIDs for the symptoms. The treatment provided mild (diclofenac helps marginally;  pain hurts with trying to work-out or push up) relief.     Uveitis - stable;  Saw rheumatology and has blood work for them.      Has tested negative x 3 for Trich.  Wife has been +, ? Cleared on tx.  Review of Systems   Respiratory: Negative for shortness of breath.     Cardiovascular: Negative for chest pain.   Neurological: Negative for tingling and numbness.       Patient Active Problem List   Diagnosis   • Sprain of acromioclavicular ligament   • Atopic dermatitis   • Dyslipidemia   • Abdominal hernia   • Prediabetes   • Morbidly obese (CMS/[facility])   • Type 2 diabetes mellitus without complication, with long-term current use of insulin (CMS/[facility])       Social History     Socioeconomic History   • Marital status:      Spouse name: Not on file   • Number of children: Not on file   • Years of education: Not on file   • Highest education level: Not on file   Tobacco Use   • Smoking status: Never Smoker   • Smokeless tobacco: Never Used   Substance and Sexual Activity   • Alcohol use: Yes     Comment: rarely   • Drug use: No   • Sexual activity: Defer       No Known Allergies    Current Outpatient Medications on File Prior to Visit   Medication Sig Dispense Refill   • ALPHAGAN P 0.1 % solution ophthalmic solution      • diclofenac (VOLTAREN) 50 MG EC tablet TAKE 1 TABLET BY MOUTH TWICE DAILY AS NEEDED FOR SHOULDER PAIN 60 tablet 0   • fluorometholone (FML) 0.1 % ophthalmic suspension      • glucose blood (ACCU-CHEK GUIDE) test strip 1 each by Other route 4 (Four) Times a Day. Use as instructed. Dx. E11.9 400 each 12   • insulin aspart (NOVOLOG FLEXPEN) 100 UNIT/ML solution pen-injector sc pen Check 3 times a day with meals +SS - -200 +3u, 201-250 +6u, 251-300 + 9u, 301-350 +12u, >351 +12u 15 mL 5   • insulin degludec (TRESIBA FLEXTOUCH) 100 UNIT/ML solution pen-injector injection Inject 10 Units under the skin into the appropriate area as directed Every Night. Dx.E11.9 15 mL 5   • Insulin Pen Needle (PEN NEEDLES) 32G X 4 MM misc 1 Device 4 (Four) Times a Day. Dx. E11.9 400 each 5   • Lancets 30G misc 1 Device 4 (Four) Times a Day. Dx. E11.9 400 each 12   • rosuvastatin (CRESTOR) 20 MG tablet Take 1 tablet by mouth Daily. 30 tablet 5   • Semaglutide, 1 MG/DOSE, (OZEMPIC, 1  "MG/DOSE,) 2 MG/1.5ML solution pen-injector Inject 1 mg under the skin into the appropriate area as directed 1 (One) Time Per Week. 2 pen 12   • Continuous Blood Gluc  (FREESTYLE LINDA 14 DAY READER) device 1 applicator Daily. 2 Device 12     No current facility-administered medications on file prior to visit.        Objective   Vitals:    05/06/20 1154   BP: 126/86   BP Location: Left arm   Patient Position: Sitting   Cuff Size: Large Adult   Pulse: 96   SpO2: 96%   Weight: (!) 162 kg (358 lb)   Height: 185.4 cm (73\")     Body mass index is 47.23 kg/m².    Physical Exam   Constitutional: He appears well-developed and well-nourished.   HENT:   Head: Normocephalic and atraumatic.   Neck: Neck supple. No JVD present. No thyromegaly present.   Cardiovascular: Normal rate, regular rhythm and normal heart sounds. Exam reveals no gallop and no friction rub.   No murmur heard.  Pulmonary/Chest: Effort normal and breath sounds normal. No respiratory distress. He has no wheezes. He has no rales.   Abdominal: Soft. Bowel sounds are normal. He exhibits no distension. There is no tenderness. There is no rebound and no guarding.   Musculoskeletal: He exhibits no edema.   Neurological: He is alert.   Skin: Skin is warm and dry.   Psychiatric: He has a normal mood and affect. His behavior is normal.   Nursing note and vitals reviewed.      Assessment/Plan   Josiah was seen today for hyperlipidemia, diabetes and shoulder pain.    Diagnoses and all orders for this visit:    Type 2 diabetes mellitus without complication, with long-term current use of insulin (CMS/[facility])  -     Hemoglobin A1c    Morbidly obese (CMS/[facility])    Dyslipidemia    Other orders  -     metroNIDAZOLE (Flagyl) 500 MG tablet; Take 1 tablet by mouth 2 (Two) Times a Day.    -noting more gerd, d/w on chronic nsaid if worsens let me know;  Denies black or bloody stools;    -shoulder pain 0ff/on with certain tasks, will change to meloxicam  -work on seigh " tloss  Recheck a1c  Continue statin, recheck next ov  Will try empiric trich tx but doubt x 3 negatives.  ? Wife cleared or not.       -Follow up: 6 months and prn

## 2020-08-20 ENCOUNTER — OFFICE VISIT (OUTPATIENT)
Dept: FAMILY MEDICINE CLINIC | Facility: CLINIC | Age: 35
End: 2020-08-20

## 2020-08-20 VITALS
TEMPERATURE: 97.8 F | HEIGHT: 73 IN | DIASTOLIC BLOOD PRESSURE: 66 MMHG | BODY MASS INDEX: 41.75 KG/M2 | OXYGEN SATURATION: 98 % | SYSTOLIC BLOOD PRESSURE: 116 MMHG | HEART RATE: 87 BPM | WEIGHT: 315 LBS

## 2020-08-20 DIAGNOSIS — E11.9 TYPE 2 DIABETES MELLITUS WITHOUT COMPLICATION, WITH LONG-TERM CURRENT USE OF INSULIN (HCC): Primary | ICD-10-CM

## 2020-08-20 DIAGNOSIS — Z79.4 TYPE 2 DIABETES MELLITUS WITHOUT COMPLICATION, WITH LONG-TERM CURRENT USE OF INSULIN (HCC): Primary | ICD-10-CM

## 2020-08-20 DIAGNOSIS — H20.9 UVEITIS: ICD-10-CM

## 2020-08-20 DIAGNOSIS — R74.8 ELEVATED LIVER ENZYMES: ICD-10-CM

## 2020-08-20 DIAGNOSIS — E78.2 MIXED HYPERLIPIDEMIA: ICD-10-CM

## 2020-08-20 DIAGNOSIS — K76.9 LIVER DISEASE: ICD-10-CM

## 2020-08-20 PROBLEM — E78.5 HYPERLIPIDEMIA: Status: ACTIVE | Noted: 2020-08-20

## 2020-08-20 PROCEDURE — 99214 OFFICE O/P EST MOD 30 MIN: CPT | Performed by: FAMILY MEDICINE

## 2020-08-20 NOTE — PATIENT INSTRUCTIONS
Calorie Counting for Weight Loss  Calories are units of energy. Your body needs a certain amount of calories from food to keep you going throughout the day. When you eat more calories than your body needs, your body stores the extra calories as fat. When you eat fewer calories than your body needs, your body burns fat to get the energy it needs.  Calorie counting means keeping track of how many calories you eat and drink each day. Calorie counting can be helpful if you need to lose weight. If you make sure to eat fewer calories than your body needs, you should lose weight. Ask your health care provider what a healthy weight is for you.  For calorie counting to work, you will need to eat the right number of calories in a day in order to lose a healthy amount of weight per week. A dietitian can help you determine how many calories you need in a day and will give you suggestions on how to reach your calorie goal.  · A healthy amount of weight to lose per week is usually 1-2 lb (0.5-0.9 kg). This usually means that your daily calorie intake should be reduced by 500-750 calories.  · Eating 1,200 - 1,500 calories per day can help most women lose weight.  · Eating 1,500 - 1,800 calories per day can help most men lose weight.  What is my plan?  My goal is to have __________ calories per day.  If I have this many calories per day, I should lose around __________ pounds per week.  What do I need to know about calorie counting?  In order to meet your daily calorie goal, you will need to:  · Find out how many calories are in each food you would like to eat. Try to do this before you eat.  · Decide how much of the food you plan to eat.  · Write down what you ate and how many calories it had. Doing this is called keeping a food log.  To successfully lose weight, it is important to balance calorie counting with a healthy lifestyle that includes regular activity. Aim for 150 minutes of moderate exercise (such as walking) or 75  minutes of vigorous exercise (such as running) each week.  Where do I find calorie information?    The number of calories in a food can be found on a Nutrition Facts label. If a food does not have a Nutrition Facts label, try to look up the calories online or ask your dietitian for help.  Remember that calories are listed per serving. If you choose to have more than one serving of a food, you will have to multiply the calories per serving by the amount of servings you plan to eat. For example, the label on a package of bread might say that a serving size is 1 slice and that there are 90 calories in a serving. If you eat 1 slice, you will have eaten 90 calories. If you eat 2 slices, you will have eaten 180 calories.  How do I keep a food log?  Immediately after each meal, record the following information in your food log:  · What you ate. Don't forget to include toppings, sauces, and other extras on the food.  · How much you ate. This can be measured in cups, ounces, or number of items.  · How many calories each food and drink had.  · The total number of calories in the meal.  Keep your food log near you, such as in a small notebook in your pocket, or use a mobile steffen or website. Some programs will calculate calories for you and show you how many calories you have left for the day to meet your goal.  What are some calorie counting tips?    · Use your calories on foods and drinks that will fill you up and not leave you hungry:  ? Some examples of foods that fill you up are nuts and nut butters, vegetables, lean proteins, and high-fiber foods like whole grains. High-fiber foods are foods with more than 5 g fiber per serving.  ? Drinks such as sodas, specialty coffee drinks, alcohol, and juices have a lot of calories, yet do not fill you up.  · Eat nutritious foods and avoid empty calories. Empty calories are calories you get from foods or beverages that do not have many vitamins or protein, such as candy, sweets, and  "soda. It is better to have a nutritious high-calorie food (such as an avocado) than a food with few nutrients (such as a bag of chips).  · Know how many calories are in the foods you eat most often. This will help you calculate calorie counts faster.  · Pay attention to calories in drinks. Low-calorie drinks include water and unsweetened drinks.  · Pay attention to nutrition labels for \"low fat\" or \"fat free\" foods. These foods sometimes have the same amount of calories or more calories than the full fat versions. They also often have added sugar, starch, or salt, to make up for flavor that was removed with the fat.  · Find a way of tracking calories that works for you. Get creative. Try different apps or programs if writing down calories does not work for you.  What are some portion control tips?  · Know how many calories are in a serving. This will help you know how many servings of a certain food you can have.  · Use a measuring cup to measure serving sizes. You could also try weighing out portions on a kitchen scale. With time, you will be able to estimate serving sizes for some foods.  · Take some time to put servings of different foods on your favorite plates, bowls, and cups so you know what a serving looks like.  · Try not to eat straight from a bag or box. Doing this can lead to overeating. Put the amount you would like to eat in a cup or on a plate to make sure you are eating the right portion.  · Use smaller plates, glasses, and bowls to prevent overeating.  · Try not to multitask (for example, watch TV or use your computer) while eating. If it is time to eat, sit down at a table and enjoy your food. This will help you to know when you are full. It will also help you to be aware of what you are eating and how much you are eating.  What are tips for following this plan?  Reading food labels  · Check the calorie count compared to the serving size. The serving size may be smaller than what you are used to " "eating.  · Check the source of the calories. Make sure the food you are eating is high in vitamins and protein and low in saturated and trans fats.  Shopping  · Read nutrition labels while you shop. This will help you make healthy decisions before you decide to purchase your food.  · Make a grocery list and stick to it.  Cooking  · Try to cook your favorite foods in a healthier way. For example, try baking instead of frying.  · Use low-fat dairy products.  Meal planning  · Use more fruits and vegetables. Half of your plate should be fruits and vegetables.  · Include lean proteins like poultry and fish.  How do I count calories when eating out?  · Ask for smaller portion sizes.  · Consider sharing an entree and sides instead of getting your own entree.  · If you get your own entree, eat only half. Ask for a box at the beginning of your meal and put the rest of your entree in it so you are not tempted to eat it.  · If calories are listed on the menu, choose the lower calorie options.  · Choose dishes that include vegetables, fruits, whole grains, low-fat dairy products, and lean protein.  · Choose items that are boiled, broiled, grilled, or steamed. Stay away from items that are buttered, battered, fried, or served with cream sauce. Items labeled \"crispy\" are usually fried, unless stated otherwise.  · Choose water, low-fat milk, unsweetened iced tea, or other drinks without added sugar. If you want an alcoholic beverage, choose a lower calorie option such as a glass of wine or light beer.  · Ask for dressings, sauces, and syrups on the side. These are usually high in calories, so you should limit the amount you eat.  · If you want a salad, choose a garden salad and ask for grilled meats. Avoid extra toppings like villafana, cheese, or fried items. Ask for the dressing on the side, or ask for olive oil and vinegar or lemon to use as dressing.  · Estimate how many servings of a food you are given. For example, a serving of " cooked rice is ½ cup or about the size of half a baseball. Knowing serving sizes will help you be aware of how much food you are eating at restaurants. The list below tells you how big or small some common portion sizes are based on everyday objects:  ? 1 oz--4 stacked dice.  ? 3 oz--1 deck of cards.  ? 1 tsp--1 die.  ? 1 Tbsp--½ a ping-pong ball.  ? 2 Tbsp--1 ping-pong ball.  ? ½ cup--½ baseball.  ? 1 cup--1 baseball.  Summary  · Calorie counting means keeping track of how many calories you eat and drink each day. If you eat fewer calories than your body needs, you should lose weight.  · A healthy amount of weight to lose per week is usually 1-2 lb (0.5-0.9 kg). This usually means reducing your daily calorie intake by 500-750 calories.  · The number of calories in a food can be found on a Nutrition Facts label. If a food does not have a Nutrition Facts label, try to look up the calories online or ask your dietitian for help.  · Use your calories on foods and drinks that will fill you up, and not on foods and drinks that will leave you hungry.  · Use smaller plates, glasses, and bowls to prevent overeating.  This information is not intended to replace advice given to you by your health care provider. Make sure you discuss any questions you have with your health care provider.  Document Released: 12/18/2006 Document Revised: 09/06/2019 Document Reviewed: 11/17/2017  [employer] Patient Education © 2020 [employer]      Exercising to Lose Weight  Exercise is structured, repetitive physical activity to improve fitness and health. Getting regular exercise is important for everyone. It is especially important if you are overweight. Being overweight increases your risk of heart disease, stroke, diabetes, high blood pressure, and several types of cancer. Reducing your calorie intake and exercising can help you lose weight.  Exercise is usually categorized as moderate or vigorous intensity. To lose weight, most people need  to do a certain amount of moderate-intensity or vigorous-intensity exercise each week.  Moderate-intensity exercise    Moderate-intensity exercise is any activity that gets you moving enough to burn at least three times more energy (calories) than if you were sitting.  Examples of moderate exercise include:  · Walking a mile in 15 minutes.  · Doing light yard work.  · Biking at an easy pace.  Most people should get at least 150 minutes (2 hours and 30 minutes) a week of moderate-intensity exercise to maintain their body weight.  Vigorous-intensity exercise  Vigorous-intensity exercise is any activity that gets you moving enough to burn at least six times more calories than if you were sitting. When you exercise at this intensity, you should be working hard enough that you are not able to carry on a conversation.  Examples of vigorous exercise include:  · Running.  · Playing a team sport, such as football, basketball, and soccer.  · Jumping rope.  Most people should get at least 75 minutes (1 hour and 15 minutes) a week of vigorous-intensity exercise to maintain their body weight.  How can exercise affect me?  When you exercise enough to burn more calories than you eat, you lose weight. Exercise also reduces body fat and builds muscle. The more muscle you have, the more calories you burn. Exercise also:  · Improves mood.  · Reduces stress and tension.  · Improves your overall fitness, flexibility, and endurance.  · Increases bone strength.  The amount of exercise you need to lose weight depends on:  · Your age.  · The type of exercise.  · Any health conditions you have.  · Your overall physical ability.  Talk to your health care provider about how much exercise you need and what types of activities are safe for you.  What actions can I take to lose weight?  Nutrition    · Make changes to your diet as told by your health care provider or diet and nutrition specialist (dietitian). This may include:  ? Eating fewer  calories.  ? Eating more protein.  ? Eating less unhealthy fats.  ? Eating a diet that includes fresh fruits and vegetables, whole grains, low-fat dairy products, and lean protein.  ? Avoiding foods with added fat, salt, and sugar.  · Drink plenty of water while you exercise to prevent dehydration or heat stroke.  Activity  · Choose an activity that you enjoy and set realistic goals. Your health care provider can help you make an exercise plan that works for you.  · Exercise at a moderate or vigorous intensity most days of the week.  ? The intensity of exercise may vary from person to person. You can tell how intense a workout is for you by paying attention to your breathing and heartbeat. Most people will notice their breathing and heartbeat get faster with more intense exercise.  · Do resistance training twice each week, such as:  ? Push-ups.  ? Sit-ups.  ? Lifting weights.  ? Using resistance bands.  · Getting short amounts of exercise can be just as helpful as long structured periods of exercise. If you have trouble finding time to exercise, try to include exercise in your daily routine.  ? Get up, stretch, and walk around every 30 minutes throughout the day.  ? Go for a walk during your lunch break.  ? Park your car farther away from your destination.  ? If you take public transportation, get off one stop early and walk the rest of the way.  ? Make phone calls while standing up and walking around.  ? Take the stairs instead of elevators or escalators.  · Wear comfortable clothes and shoes with good support.  · Do not exercise so much that you hurt yourself, feel dizzy, or get very short of breath.  Where to find more information  · [place] Department of Health and Human Services: www.hhs.gov  · Centers for Disease Control and Prevention (CDC): www.cdc.gov  Contact a health care provider:  · Before starting a new exercise program.  · If you have questions or concerns about your weight.  · If you have a medical  problem that keeps you from exercising.  Get help right away if you have any of the following while exercising:  · Injury.  · Dizziness.  · Difficulty breathing or shortness of breath that does not go away when you stop exercising.  · Chest pain.  · Rapid heartbeat.  Summary  · Being overweight increases your risk of heart disease, stroke, diabetes, high blood pressure, and several types of cancer.  · Losing weight happens when you burn more calories than you eat.  · Reducing the amount of calories you eat in addition to getting regular moderate or vigorous exercise each week helps you lose weight.  This information is not intended to replace advice given to you by your health care provider. Make sure you discuss any questions you have with your health care provider.  Document Released: 01/20/2012 Document Revised: 12/31/2018 Document Reviewed: 12/31/2018  [employer] Patient Education © 2020 [employer]

## 2020-08-20 NOTE — PROGRESS NOTES
Subjective   Josiah Loomis is a 35 y.o. male. Presents today for   Chief Complaint   Patient presents with   • Elevated Hepatic Enzymes   • Eye Problem     wants leave of absence   • Exposure To Known Illness     Pt tested negative last week but been exposed   • Headache   • Diabetes       Eye Problem    Both eyes are affected.This is a chronic problem. The current episode started more than 1 year ago. The problem occurs constantly. The problem has been unchanged. There was no injury mechanism. The patient is experiencing no pain. Associated symptoms include blurred vision. Pertinent negatives include no double vision, fever, nausea, recent URI or vomiting. Associated symptoms comments: Headaches. He has tried eye drops for the symptoms. Improvement on treatment: seeing opthalmology as uveitis worsneing, has cataracts;  Sent to rheumatology, working up for autoimmune arthritis as suspect with uveitis;  Somewhat delayed due to pandemic.   Diabetes   He presents for his follow-up diabetic visit. He has type 2 diabetes mellitus. His disease course has been stable. Associated symptoms include blurred vision. Pertinent negatives for diabetes include no chest pain, no foot paresthesias and no foot ulcerations. Symptoms are stable. An ACE inhibitor/angiotensin II receptor blocker is not being taken.   Hyperlipidemia   This is a chronic problem. The current episode started more than 1 year ago. The problem is controlled. Recent lipid tests were reviewed and are normal. Exacerbating diseases include diabetes. Pertinent negatives include no chest pain or shortness of breath. Current antihyperlipidemic treatment includes statins. The current treatment provides moderate improvement of lipids. Risk factors for coronary artery disease include dyslipidemia, diabetes mellitus, male sex and obesity.     Given eye progression and pain along with being high risk would like leave of absence due to covid 19 risks.      Has elevated  liver enzymes, needs further evaluation.    Review of Systems   Constitutional: Negative for chills and fever.   Eyes: Positive for blurred vision and visual disturbance. Negative for double vision.   Respiratory: Negative for shortness of breath.    Cardiovascular: Negative for chest pain, palpitations and leg swelling.   Gastrointestinal: Negative for abdominal pain, nausea and vomiting.       Patient Active Problem List   Diagnosis   • Sprain of acromioclavicular ligament   • Atopic dermatitis   • Dyslipidemia   • Abdominal hernia   • Prediabetes   • Morbidly obese (CMS/[facility])   • Type 2 diabetes mellitus without complication, with long-term current use of insulin (CMS/[facility])       Social History     Socioeconomic History   • Marital status:      Spouse name: Not on file   • Number of children: Not on file   • Years of education: Not on file   • Highest education level: Not on file   Tobacco Use   • Smoking status: Never Smoker   • Smokeless tobacco: Never Used   Substance and Sexual Activity   • Alcohol use: Yes     Comment: rarely   • Drug use: No   • Sexual activity: Defer       No Known Allergies    Current Outpatient Medications on File Prior to Visit   Medication Sig Dispense Refill   • ALPHAGAN P 0.1 % solution ophthalmic solution      • Continuous Blood Gluc  (FREESTYLE LINDA 14 DAY READER) device 1 applicator Daily. 2 Device 12   • famotidine (PEPCID) 40 MG tablet Take 1 tablet by mouth Every Night. 30 tablet 5   • fluorometholone (FML) 0.1 % ophthalmic suspension      • glucose blood (ACCU-CHEK GUIDE) test strip 1 each by Other route 4 (Four) Times a Day. Use as instructed. Dx. E11.9 400 each 12   • insulin aspart (NOVOLOG FLEXPEN) 100 UNIT/ML solution pen-injector sc pen Check 3 times a day with meals +SS - -200 +3u, 201-250 +6u, 251-300 + 9u, 301-350 +12u, >351 +12u 15 mL 5   • insulin degludec (TRESIBA FLEXTOUCH) 100 UNIT/ML solution pen-injector injection Inject 10 Units under the  "skin into the appropriate area as directed Every Night. Dx.E11.9 15 mL 5   • Insulin Pen Needle (PEN NEEDLES) 32G X 4 MM misc 1 Device 4 (Four) Times a Day. Dx. E11.9 400 each 5   • Lancets 30G misc 1 Device 4 (Four) Times a Day. Dx. E11.9 400 each 12   • meloxicam (MOBIC) 15 MG tablet Take 1 tablet by mouth Daily. 30 tablet 5   • metroNIDAZOLE (Flagyl) 500 MG tablet Take 1 tablet by mouth 2 (Two) Times a Day. 14 tablet 0   • rosuvastatin (CRESTOR) 20 MG tablet Take 1 tablet by mouth Daily. 30 tablet 5   • Semaglutide, 1 MG/DOSE, (OZEMPIC, 1 MG/DOSE,) 2 MG/1.5ML solution pen-injector Inject 1 mg under the skin into the appropriate area as directed 1 (One) Time Per Week. 2 pen 12     No current facility-administered medications on file prior to visit.        Objective   Vitals:    08/20/20 0910   BP: 116/66   BP Location: Left arm   Patient Position: Sitting   Cuff Size: Large Adult   Pulse: 87   Temp: 97.8 °F (36.6 °C)   TempSrc: Oral   SpO2: 98%   Weight: (!) 154 kg (339 lb)   Height: 185.4 cm (73\")     Body mass index is 44.73 kg/m².    Physical Exam   Constitutional: He appears well-developed and well-nourished.   HENT:   Head: Normocephalic and atraumatic.   Neck: Neck supple. No JVD present. No thyromegaly present.   Cardiovascular: Normal rate, regular rhythm and normal heart sounds. Exam reveals no gallop and no friction rub.   No murmur heard.  Pulmonary/Chest: Effort normal and breath sounds normal. No respiratory distress. He has no wheezes. He has no rales.   Abdominal: Soft. Bowel sounds are normal. He exhibits no distension. There is no tenderness. There is no rebound and no guarding.   Musculoskeletal: He exhibits no edema.   Neurological: He is alert.   Skin: Skin is warm and dry.   Psychiatric: He has a normal mood and affect. His behavior is normal.   Nursing note and vitals reviewed.      Assessment/Plan   Josiah was seen today for elevated hepatic enzymes, eye problem, exposure to known illness, " headache and diabetes.    Diagnoses and all orders for this visit:    Type 2 diabetes mellitus without complication, with long-term current use of insulin (CMS/HCC)  -     Microalbumin / Creatinine Urine Ratio - Urine, Clean Catch  -     Lipid Panel  -     Comprehensive Metabolic Panel  -     Hemoglobin A1c    Mixed hyperlipidemia  -     Lipid Panel    Elevated liver enzymes  -     Comprehensive Metabolic Panel  -     CBC & Differential  -     Mitochondrial Antibodies, M2  -     Anti-Smooth Muscle Antibody Titer  -     ABBEY With / DsDNA, RNP, Sjogrens A / B, Smith  -     Ceruloplasmin  -     C-reactive Protein  -     Sedimentation Rate  -     A1A, Quant & Genotype (Rfx Pheno)  -     Hepatitis Panel, Acute  -     US Liver; Future  -     AFP Tumor Marker    Uveitis  -     Comprehensive Metabolic Panel  -     CBC & Differential  -     Mitochondrial Antibodies, M2  -     Anti-Smooth Muscle Antibody Titer  -     ABBEY With / DsDNA, RNP, Sjogrens A / B, Smith  -     Ceruloplasmin  -     C-reactive Protein  -     Sedimentation Rate  -     A1A, Quant & Genotype (Rfx Pheno)  -     Hepatitis Panel, Acute  -     US Liver; Future    Liver disease  -     Comprehensive Metabolic Panel  -     CBC & Differential  -     Mitochondrial Antibodies, M2  -     Anti-Smooth Muscle Antibody Titer  -     ABBEY With / DsDNA, RNP, Sjogrens A / B, Smith  -     Ceruloplasmin  -     C-reactive Protein  -     Sedimentation Rate  -     A1A, Quant & Genotype (Rfx Pheno)  -     Hepatitis Panel, Acute  -     US Liver; Future  -     AFP Tumor Marker    -continue see rheumatology and ophthalmology as planned;  Has xrays coming and sees ophthalmology today as vision loss progressing.    -elevated liver enzymes, need further work-up especialy with possibility of auto immune issue;  Suspect TOLEDO, will image;  Once back consider hepatology referral.  -dm2 - continue medications, recheck labs;  Has lost almost 20 lbs on ozempic which is good  -HLD - continue  statin, recheck lipids.  May need change or hold statin.         -Follow up: 3 months and prn

## 2020-09-01 ENCOUNTER — HOSPITAL ENCOUNTER (OUTPATIENT)
Dept: ULTRASOUND IMAGING | Facility: HOSPITAL | Age: 35
Discharge: HOME | End: 2020-09-01
Admitting: FAMILY MEDICINE

## 2020-09-01 DIAGNOSIS — R74.8 ELEVATED LIVER ENZYMES: ICD-10-CM

## 2020-09-01 DIAGNOSIS — K76.9 LIVER DISEASE: ICD-10-CM

## 2020-09-01 DIAGNOSIS — H20.9 UVEITIS: ICD-10-CM

## 2020-09-01 PROCEDURE — 76705 ECHO EXAM OF ABDOMEN: CPT

## 2020-09-04 LAB
A1A RFX TO PHENOTYPE: ABNORMAL
A1AT SERPL-MCNC: 192 MG/DL (ref 95–164)
ACTIN IGG SERPL-ACNC: 4 UNITS (ref 0–19)
AFP-TM SERPL-MCNC: 2.5 NG/ML (ref 0–8.3)
ALBUMIN SERPL-MCNC: 4.5 G/DL (ref 3.5–5.2)
ALBUMIN/CREAT UR: 15 MG/G CREAT (ref 0–29)
ALBUMIN/GLOB SERPL: 1.6 G/DL
ALP SERPL-CCNC: 289 U/L (ref 39–117)
ALT SERPL-CCNC: 91 U/L (ref 1–41)
ANA SER QL: NEGATIVE
AST SERPL-CCNC: 81 U/L (ref 1–40)
BASOPHILS # BLD AUTO: 0.09 10*3/MM3 (ref 0–0.2)
BASOPHILS NFR BLD AUTO: 1.2 % (ref 0–1.5)
BILIRUB SERPL-MCNC: 0.7 MG/DL (ref 0–1.2)
BUN SERPL-MCNC: 9 MG/DL (ref 6–20)
BUN/CREAT SERPL: 9.3 (ref 7–25)
CALCIUM SERPL-MCNC: 9.8 MG/DL (ref 8.6–10.5)
CERULOPLASMIN SERPL-MCNC: 42.5 MG/DL (ref 16–31)
CHLORIDE SERPL-SCNC: 100 MMOL/L (ref 98–107)
CHOLEST SERPL-MCNC: 263 MG/DL (ref 0–200)
CO2 SERPL-SCNC: 25.2 MMOL/L (ref 22–29)
CREAT SERPL-MCNC: 0.97 MG/DL (ref 0.76–1.27)
CREAT UR-MCNC: 379.2 MG/DL
CRP SERPL-MCNC: 0.8 MG/DL (ref 0–0.5)
EOSINOPHIL # BLD AUTO: 0.33 10*3/MM3 (ref 0–0.4)
EOSINOPHIL NFR BLD AUTO: 4.5 % (ref 0.3–6.2)
ERYTHROCYTE [DISTWIDTH] IN BLOOD BY AUTOMATED COUNT: 13.3 % (ref 12.3–15.4)
ERYTHROCYTE [SEDIMENTATION RATE] IN BLOOD BY WESTERGREN METHOD: 6 MM/HR (ref 0–15)
GLOBULIN SER CALC-MCNC: 2.9 GM/DL
GLUCOSE SERPL-MCNC: 103 MG/DL (ref 65–99)
HAV IGM SERPL QL IA: NEGATIVE
HBA1C MFR BLD: 6.2 % (ref 4.8–5.6)
HBV CORE IGM SERPL QL IA: NEGATIVE
HBV SURFACE AG SERPL QL IA: NEGATIVE
HCT VFR BLD AUTO: 48 % (ref 37.5–51)
HCV AB S/CO SERPL IA: <0.1 S/CO RATIO (ref 0–0.9)
HDLC SERPL-MCNC: 57 MG/DL (ref 40–60)
HGB BLD-MCNC: 16.4 G/DL (ref 13–17.7)
IMM GRANULOCYTES # BLD AUTO: 0.02 10*3/MM3 (ref 0–0.05)
IMM GRANULOCYTES NFR BLD AUTO: 0.3 % (ref 0–0.5)
LAB DIRECTOR NAME PROVIDER: ABNORMAL
LDLC SERPL CALC-MCNC: 185 MG/DL (ref 0–100)
LYMPHOCYTES # BLD AUTO: 1.61 10*3/MM3 (ref 0.7–3.1)
LYMPHOCYTES NFR BLD AUTO: 22.1 % (ref 19.6–45.3)
MCH RBC QN AUTO: 26.6 PG (ref 26.6–33)
MCHC RBC AUTO-ENTMCNC: 34.2 G/DL (ref 31.5–35.7)
MCV RBC AUTO: 77.8 FL (ref 79–97)
MICROALBUMIN UR-MCNC: 56.9 UG/ML
MITOCHONDRIA M2 IGG SER-ACNC: <20 UNITS (ref 0–20)
MONOCYTES # BLD AUTO: 0.66 10*3/MM3 (ref 0.1–0.9)
MONOCYTES NFR BLD AUTO: 9.1 % (ref 5–12)
NEUTROPHILS # BLD AUTO: 4.58 10*3/MM3 (ref 1.7–7)
NEUTROPHILS NFR BLD AUTO: 62.8 % (ref 42.7–76)
NRBC BLD AUTO-RTO: 0 /100 WBC (ref 0–0.2)
PLATELET # BLD AUTO: 272 10*3/MM3 (ref 140–450)
POTASSIUM SERPL-SCNC: 4.6 MMOL/L (ref 3.5–5.2)
PROT SERPL-MCNC: 7.4 G/DL (ref 6–8.5)
RBC # BLD AUTO: 6.17 10*6/MM3 (ref 4.14–5.8)
SERPINA1 GENE MUT ANL BLD/T: ABNORMAL
SERPINA1 GENE MUT TESTED BLD/T: ABNORMAL
SODIUM SERPL-SCNC: 138 MMOL/L (ref 136–145)
TRIGL SERPL-MCNC: 106 MG/DL (ref 0–150)
VLDLC SERPL CALC-MCNC: 21.2 MG/DL
WBC # BLD AUTO: 7.29 10*3/MM3 (ref 3.4–10.8)

## 2020-09-16 DIAGNOSIS — R74.8 ELEVATED LIVER ENZYMES: Primary | ICD-10-CM

## 2020-09-17 RX ORDER — PEN NEEDLE, DIABETIC 32GX 5/32"
NEEDLE, DISPOSABLE MISCELLANEOUS
Qty: 400 EACH | Refills: 5 | Status: SHIPPED | OUTPATIENT
Start: 2020-09-17 | End: 2021-09-29

## 2020-10-13 RX ORDER — ROSUVASTATIN CALCIUM 20 MG/1
20 TABLET, COATED ORAL DAILY
Qty: 30 TABLET | Refills: 5 | Status: SHIPPED | OUTPATIENT
Start: 2020-10-13 | End: 2021-04-09

## 2020-11-04 NOTE — TELEPHONE ENCOUNTER
Patient requested refill for insulin aspart (NOVOLOG FLEXPEN) 100 UNIT/ML solution pen-injector sc pen. Patient stated that the insulin he still had has      Please advise     [phone]

## 2020-11-05 RX ORDER — INSULIN ASPART 100 [IU]/ML
INJECTION, SOLUTION INTRAVENOUS; SUBCUTANEOUS
Qty: 15 ML | Refills: 5 | Status: SHIPPED | OUTPATIENT
Start: 2020-11-05 | End: 2020-11-07 | Stop reason: SDUPTHER

## 2020-11-05 RX ORDER — INSULIN DEGLUDEC INJECTION 100 U/ML
INJECTION, SOLUTION SUBCUTANEOUS
Qty: 15 ML | Refills: 5 | Status: SHIPPED | OUTPATIENT
Start: 2020-11-05 | End: 2022-01-05 | Stop reason: SDUPTHER

## 2020-11-07 ENCOUNTER — DOCUMENTATION (OUTPATIENT)
Dept: FAMILY MEDICINE CLINIC | Facility: CLINIC | Age: 35
End: 2020-11-07

## 2020-11-07 RX ORDER — INSULIN ASPART 100 [IU]/ML
INJECTION, SOLUTION INTRAVENOUS; SUBCUTANEOUS
Qty: 15 ML | Refills: 5 | Status: SHIPPED | OUTPATIENT
Start: 2020-11-07 | End: 2020-11-07

## 2020-11-07 RX ORDER — INSULIN LISPRO 100 [IU]/ML
INJECTION, SOLUTION INTRAVENOUS; SUBCUTANEOUS
Qty: 4 PEN | Refills: 11 | Status: SHIPPED | OUTPATIENT
Start: 2020-11-07 | End: 2021-12-09

## 2020-11-23 RX ORDER — FLASH GLUCOSE SENSOR
KIT MISCELLANEOUS
Qty: 2 EACH | Refills: 12 | Status: SHIPPED | OUTPATIENT
Start: 2020-11-23 | End: 2020-12-07 | Stop reason: SDUPTHER

## 2020-12-01 ENCOUNTER — TELEPHONE (OUTPATIENT)
Dept: FAMILY MEDICINE CLINIC | Facility: CLINIC | Age: 35
End: 2020-12-01

## 2020-12-01 DIAGNOSIS — R74.8 ELEVATED LIVER ENZYMES: Primary | ICD-10-CM

## 2020-12-01 NOTE — TELEPHONE ENCOUNTER
[facility] IS NEEDING A CALL BACK FROM JUDY ABOUT THE PATIENT SPECIALIST HE IS TO SEE      TEE- [facility]  [phone]

## 2020-12-01 NOTE — TELEPHONE ENCOUNTER
I called pt and his voicemail is not set up. I also called pt's wife and spoke to her and gave her the information that pt needs to call [facility] liver spec to schedule appt. I gave wife phone number and let her know pt's information was faxed to that office.

## 2020-12-01 NOTE — TELEPHONE ENCOUNTER
PATIENT CALLED AND STATES HE WAS SEEN BY DR. PINEDO, EYE SPECIALIST AND SHE STATES HE NEEDS TO HAVE APPOINTMENT ASAP DUE TO HIS LIVER FUNCTIONS BEING DOUBLED SINCE LAST TIME. SHE ORDERED BLOOD WORK AND HE HAD IT DONE ON Saturday.     DR. PINEDO, EYE SPECIALIST WILL BE CONTACTING DR. CHAUDHRY..     FIRST AVAILABLE WITH DR. CHAUDHRY IS 1/25/2021    PATIENT CALL BACK NUMBER [phone]

## 2020-12-04 DIAGNOSIS — M25.511 CHRONIC RIGHT SHOULDER PAIN: ICD-10-CM

## 2020-12-04 DIAGNOSIS — G89.29 CHRONIC RIGHT SHOULDER PAIN: ICD-10-CM

## 2020-12-04 RX ORDER — MELOXICAM 15 MG/1
15 TABLET ORAL DAILY
Qty: 30 TABLET | Refills: 5 | Status: SHIPPED | OUTPATIENT
Start: 2020-12-04 | End: 2021-06-17

## 2020-12-07 DIAGNOSIS — Z79.4 TYPE 2 DIABETES MELLITUS WITHOUT COMPLICATION, WITH LONG-TERM CURRENT USE OF INSULIN (HCC): Primary | ICD-10-CM

## 2020-12-07 DIAGNOSIS — E11.9 TYPE 2 DIABETES MELLITUS WITHOUT COMPLICATION, WITH LONG-TERM CURRENT USE OF INSULIN (HCC): Primary | ICD-10-CM

## 2020-12-07 RX ORDER — FLASH GLUCOSE SENSOR
1 KIT MISCELLANEOUS SEE ADMIN INSTRUCTIONS
Qty: 2 EACH | Refills: 12 | Status: SHIPPED | OUTPATIENT
Start: 2020-12-07 | End: 2021-12-09 | Stop reason: SDUPTHER

## 2020-12-07 NOTE — TELEPHONE ENCOUNTER
Caller: Brigid vAila    Relationship: Emergency Contact    Best call back number: [phone]    Medication needed:   Requested Prescriptions     Pending Prescriptions Disp Refills   • Continuous Blood Gluc Sensor (FreeStyle Checo 14 Day Sensor) misc 2 each 12     Sig: See Admin Instructions.       When do you need the refill by: 12/08/20    What details did the patient provide when requesting the medication: PATIENT BROKE HIS LAST ONE    Does the patient have less than a 3 day supply:  [x] Yes  [] No    What is the patient's preferred pharmacy: [id number] - [place], [place] - [facility] AT [facility] &  - [phone]  - [phone] FX

## 2021-01-07 DIAGNOSIS — K21.9 GASTROESOPHAGEAL REFLUX DISEASE WITHOUT ESOPHAGITIS: ICD-10-CM

## 2021-01-07 RX ORDER — FAMOTIDINE 40 MG/1
40 TABLET, FILM COATED ORAL NIGHTLY
Qty: 30 TABLET | Refills: 5 | Status: SHIPPED | OUTPATIENT
Start: 2021-01-07 | End: 2021-07-14

## 2021-03-02 DIAGNOSIS — Z79.4 TYPE 2 DIABETES MELLITUS WITHOUT COMPLICATION, WITH LONG-TERM CURRENT USE OF INSULIN (HCC): ICD-10-CM

## 2021-03-02 DIAGNOSIS — E11.9 TYPE 2 DIABETES MELLITUS WITHOUT COMPLICATION, WITH LONG-TERM CURRENT USE OF INSULIN (HCC): ICD-10-CM

## 2021-03-02 RX ORDER — SEMAGLUTIDE 1.34 MG/ML
INJECTION, SOLUTION SUBCUTANEOUS
Qty: 3 ML | Refills: 3 | Status: SHIPPED | OUTPATIENT
Start: 2021-03-02 | End: 2021-07-14

## 2021-04-09 RX ORDER — ROSUVASTATIN CALCIUM 20 MG/1
20 TABLET, COATED ORAL DAILY
Qty: 30 TABLET | Refills: 5 | Status: SHIPPED | OUTPATIENT
Start: 2021-04-09 | End: 2022-01-05 | Stop reason: SDUPTHER

## 2021-04-16 ENCOUNTER — BULK ORDERING (OUTPATIENT)
Dept: CASE MANAGEMENT | Facility: OTHER | Age: 36
End: 2021-04-16

## 2021-04-16 DIAGNOSIS — Z23 IMMUNIZATION DUE: ICD-10-CM

## 2021-06-17 DIAGNOSIS — M25.511 CHRONIC RIGHT SHOULDER PAIN: ICD-10-CM

## 2021-06-17 DIAGNOSIS — G89.29 CHRONIC RIGHT SHOULDER PAIN: ICD-10-CM

## 2021-06-17 RX ORDER — MELOXICAM 15 MG/1
15 TABLET ORAL DAILY
Qty: 30 TABLET | Refills: 0 | Status: SHIPPED | OUTPATIENT
Start: 2021-06-17 | End: 2021-07-14

## 2021-07-14 DIAGNOSIS — G89.29 CHRONIC RIGHT SHOULDER PAIN: ICD-10-CM

## 2021-07-14 DIAGNOSIS — Z79.4 TYPE 2 DIABETES MELLITUS WITHOUT COMPLICATION, WITH LONG-TERM CURRENT USE OF INSULIN (HCC): ICD-10-CM

## 2021-07-14 DIAGNOSIS — M25.511 CHRONIC RIGHT SHOULDER PAIN: ICD-10-CM

## 2021-07-14 DIAGNOSIS — K21.9 GASTROESOPHAGEAL REFLUX DISEASE WITHOUT ESOPHAGITIS: ICD-10-CM

## 2021-07-14 DIAGNOSIS — E11.9 TYPE 2 DIABETES MELLITUS WITHOUT COMPLICATION, WITH LONG-TERM CURRENT USE OF INSULIN (HCC): ICD-10-CM

## 2021-07-14 RX ORDER — MELOXICAM 15 MG/1
TABLET ORAL
Qty: 30 TABLET | Refills: 0 | Status: SHIPPED | OUTPATIENT
Start: 2021-07-14 | End: 2021-08-10

## 2021-07-14 RX ORDER — SEMAGLUTIDE 1.34 MG/ML
INJECTION, SOLUTION SUBCUTANEOUS
Qty: 3 ML | Refills: 3 | Status: SHIPPED | OUTPATIENT
Start: 2021-07-14 | End: 2022-01-05 | Stop reason: SDUPTHER

## 2021-07-14 RX ORDER — FAMOTIDINE 40 MG/1
40 TABLET, FILM COATED ORAL NIGHTLY
Qty: 30 TABLET | Refills: 5 | Status: SHIPPED | OUTPATIENT
Start: 2021-07-14 | End: 2022-01-07

## 2021-08-10 DIAGNOSIS — G89.29 CHRONIC RIGHT SHOULDER PAIN: ICD-10-CM

## 2021-08-10 DIAGNOSIS — M25.511 CHRONIC RIGHT SHOULDER PAIN: ICD-10-CM

## 2021-08-10 RX ORDER — MELOXICAM 15 MG/1
15 TABLET ORAL DAILY
Qty: 15 TABLET | Refills: 0 | Status: SHIPPED | OUTPATIENT
Start: 2021-08-10 | End: 2022-05-31

## 2021-09-07 DIAGNOSIS — M25.511 CHRONIC RIGHT SHOULDER PAIN: ICD-10-CM

## 2021-09-07 DIAGNOSIS — G89.29 CHRONIC RIGHT SHOULDER PAIN: ICD-10-CM

## 2021-09-08 RX ORDER — MELOXICAM 15 MG/1
TABLET ORAL
Qty: 15 TABLET | Refills: 0 | OUTPATIENT
Start: 2021-09-08

## 2021-09-29 RX ORDER — PEN NEEDLE, DIABETIC 32GX 5/32"
NEEDLE, DISPOSABLE MISCELLANEOUS
Qty: 400 EACH | Refills: 5 | Status: SHIPPED | OUTPATIENT
Start: 2021-09-29 | End: 2022-01-05 | Stop reason: SDUPTHER

## 2021-12-08 DIAGNOSIS — Z79.4 TYPE 2 DIABETES MELLITUS WITHOUT COMPLICATION, WITH LONG-TERM CURRENT USE OF INSULIN (HCC): ICD-10-CM

## 2021-12-08 DIAGNOSIS — E11.9 TYPE 2 DIABETES MELLITUS WITHOUT COMPLICATION, WITH LONG-TERM CURRENT USE OF INSULIN (HCC): ICD-10-CM

## 2021-12-09 ENCOUNTER — TELEPHONE (OUTPATIENT)
Dept: FAMILY MEDICINE CLINIC | Facility: CLINIC | Age: 36
End: 2021-12-09

## 2021-12-09 DIAGNOSIS — Z79.4 TYPE 2 DIABETES MELLITUS WITHOUT COMPLICATION, WITH LONG-TERM CURRENT USE OF INSULIN (HCC): ICD-10-CM

## 2021-12-09 DIAGNOSIS — E11.9 TYPE 2 DIABETES MELLITUS WITHOUT COMPLICATION, WITH LONG-TERM CURRENT USE OF INSULIN (HCC): Primary | ICD-10-CM

## 2021-12-09 DIAGNOSIS — Z79.4 TYPE 2 DIABETES MELLITUS WITHOUT COMPLICATION, WITH LONG-TERM CURRENT USE OF INSULIN (HCC): Primary | ICD-10-CM

## 2021-12-09 DIAGNOSIS — E11.9 TYPE 2 DIABETES MELLITUS WITHOUT COMPLICATION, WITH LONG-TERM CURRENT USE OF INSULIN (HCC): ICD-10-CM

## 2021-12-09 RX ORDER — INSULIN DEGLUDEC INJECTION 100 U/ML
INJECTION, SOLUTION SUBCUTANEOUS
Qty: 15 ML | Refills: 5 | OUTPATIENT
Start: 2021-12-09

## 2021-12-09 RX ORDER — FLASH GLUCOSE SENSOR
1 KIT MISCELLANEOUS SEE ADMIN INSTRUCTIONS
Qty: 2 EACH | Refills: 1 | Status: SHIPPED | OUTPATIENT
Start: 2021-12-09 | End: 2022-01-05 | Stop reason: SDUPTHER

## 2021-12-09 RX ORDER — INSULIN LISPRO 100 [IU]/ML
INJECTION, SOLUTION INTRAVENOUS; SUBCUTANEOUS
Qty: 12 ML | Refills: 0 | Status: SHIPPED | OUTPATIENT
Start: 2021-12-09 | End: 2022-01-05 | Stop reason: SDUPTHER

## 2021-12-09 RX ORDER — FLASH GLUCOSE SENSOR
KIT MISCELLANEOUS
Qty: 2 EACH | Refills: 12 | OUTPATIENT
Start: 2021-12-09

## 2021-12-09 RX ORDER — INSULIN LISPRO 100 [IU]/ML
INJECTION, SOLUTION INTRAVENOUS; SUBCUTANEOUS
Qty: 12 ML | Refills: 0 | Status: SHIPPED | OUTPATIENT
Start: 2021-12-09 | End: 2021-12-09 | Stop reason: SDUPTHER

## 2021-12-09 NOTE — TELEPHONE ENCOUNTER
Directions on script:  INJECT THREE TIMES DAILY BEFORE MEAL ON SLIDING SCALE: 3 UNITS(-200), 6U( 201-250), 9U( 251-300), 12U(301-350), 12U(BS>351) MAX 36 U/ DAY,   I sent in too. RRJ

## 2021-12-09 NOTE — TELEPHONE ENCOUNTER
Pharmacy Name:  [facility]    Pharmacy representative name: GODFREY    Pharmacy representative phone number:   [phone]  What medication are you calling in regards to:    Insulin Lispro, 1 Unit Dial, (HumaLOG KwikPen) 100 UNIT/ML solution pen-injector         What question does the pharmacy have: GODFREY HAS QUESTIONS REGARDING DOSAGE/SLIDING SCALE.  HE WOULD LIKE A RETURN CALL TO CLARIFY.    Who is the provider that prescribed the medication: GONZALEZ CHAUDHRY    Additional notes: DOSAGE CLARIFICATION    PLEASE ADVISE.

## 2022-01-05 ENCOUNTER — TELEMEDICINE (OUTPATIENT)
Dept: FAMILY MEDICINE CLINIC | Facility: CLINIC | Age: 37
End: 2022-01-05

## 2022-01-05 DIAGNOSIS — E11.65 TYPE 2 DIABETES MELLITUS WITH HYPERGLYCEMIA, WITH LONG-TERM CURRENT USE OF INSULIN: Primary | ICD-10-CM

## 2022-01-05 DIAGNOSIS — G89.29 CHRONIC LEFT SHOULDER PAIN: ICD-10-CM

## 2022-01-05 DIAGNOSIS — E78.2 MIXED HYPERLIPIDEMIA: ICD-10-CM

## 2022-01-05 DIAGNOSIS — Z79.4 TYPE 2 DIABETES MELLITUS WITH HYPERGLYCEMIA, WITH LONG-TERM CURRENT USE OF INSULIN: Primary | ICD-10-CM

## 2022-01-05 DIAGNOSIS — M25.512 CHRONIC LEFT SHOULDER PAIN: ICD-10-CM

## 2022-01-05 DIAGNOSIS — H20.9 UVEITIS: ICD-10-CM

## 2022-01-05 DIAGNOSIS — E66.01 MORBIDLY OBESE: ICD-10-CM

## 2022-01-05 PROCEDURE — 99214 OFFICE O/P EST MOD 30 MIN: CPT | Performed by: FAMILY MEDICINE

## 2022-01-05 RX ORDER — FLASH GLUCOSE SENSOR
1 KIT MISCELLANEOUS SEE ADMIN INSTRUCTIONS
Qty: 6 EACH | Refills: 1 | Status: SHIPPED | OUTPATIENT
Start: 2022-01-05 | End: 2022-01-27

## 2022-01-05 RX ORDER — ROSUVASTATIN CALCIUM 20 MG/1
20 TABLET, COATED ORAL DAILY
Qty: 30 TABLET | Refills: 5 | Status: SHIPPED | OUTPATIENT
Start: 2022-01-05 | End: 2022-03-14 | Stop reason: DRUGHIGH

## 2022-01-05 RX ORDER — INSULIN LISPRO 100 [IU]/ML
INJECTION, SOLUTION INTRAVENOUS; SUBCUTANEOUS
Qty: 12 ML | Refills: 0 | Status: SHIPPED | OUTPATIENT
Start: 2022-01-05 | End: 2022-03-30

## 2022-01-05 RX ORDER — SEMAGLUTIDE 1.34 MG/ML
1 INJECTION, SOLUTION SUBCUTANEOUS WEEKLY
Qty: 3 ML | Refills: 3 | Status: SHIPPED | OUTPATIENT
Start: 2022-01-05 | End: 2022-12-28 | Stop reason: SDUPTHER

## 2022-01-05 RX ORDER — PEN NEEDLE, DIABETIC 32GX 5/32"
NEEDLE, DISPOSABLE MISCELLANEOUS
Qty: 400 EACH | Refills: 5 | Status: SHIPPED | OUTPATIENT
Start: 2022-01-05 | End: 2023-01-10

## 2022-01-05 RX ORDER — INSULIN DEGLUDEC INJECTION 100 U/ML
10 INJECTION, SOLUTION SUBCUTANEOUS NIGHTLY
Qty: 15 ML | Refills: 5 | Status: SHIPPED | OUTPATIENT
Start: 2022-01-05 | End: 2022-03-14 | Stop reason: SDUPTHER

## 2022-01-05 NOTE — PROGRESS NOTES
Subjective   Josiah Loomis is a 36 y.o. male. Presents today for   Chief Complaint   Patient presents with   • Diabetes   • Hyperlipidemia      Patient VIDEO VISIT and gave consent to treat.  Used [employer] steffen that is HIPAA compliant   History of Present Illness  Patient 37 y/o male with dm2, htl and not seen in a while.  Had long complicated course of uveitis, rest negative;  Seeing ophthalmology, rehumatology and had liver bx due to elevated liver enzymes and concers autoimmune.   Reports liver bx negative.   Reports blood sugars very uncontrolled.  Has had eye surgery, reports told cataracts and glaucoma.  Reports vision improved,,but cannot see well and needs assistance to ambulate anywhere.  Reports blood sugars fluctuating and very high.       Also having left shoulder pain still over 1 year, with no improvemetn;   Hursts to move.  Has had injection with no relief initially.  Last plain film 6 years ago.      Review of Systems   Eyes: Positive for visual disturbance.   Respiratory: Negative for shortness of breath.    Cardiovascular: Negative for chest pain and palpitations.   Gastrointestinal: Negative for nausea and vomiting.       Patient Active Problem List   Diagnosis   • Sprain of acromioclavicular ligament   • Atopic dermatitis   • Dyslipidemia   • Abdominal hernia   • Morbidly obese (HCC)   • Type 2 diabetes mellitus without complication, with long-term current use of insulin (HCC)   • Hyperlipidemia       Social History     Socioeconomic History   • Marital status:    Tobacco Use   • Smoking status: Never Smoker   • Smokeless tobacco: Never Used   Substance and Sexual Activity   • Alcohol use: Yes     Comment: rarely   • Drug use: No   • Sexual activity: Defer       No Known Allergies    Current Outpatient Medications on File Prior to Visit   Medication Sig Dispense Refill   • ALPHAGAN P 0.1 % solution ophthalmic solution      • Continuous Blood Gluc  (BiOxyDynYLE LINDA 14 DAY READER)  device 1 applicator Daily. 2 Device 12   • famotidine (PEPCID) 40 MG tablet TAKE 1 TABLET BY MOUTH EVERY NIGHT 30 tablet 5   • fluorometholone (FML) 0.1 % ophthalmic suspension      • glucose blood (ACCU-CHEK GUIDE) test strip 1 each by Other route 4 (Four) Times a Day. Use as instructed. Dx. E11.9 400 each 12   • Lancets 30G misc 1 Device 4 (Four) Times a Day. Dx. E11.9 400 each 12   • meloxicam (MOBIC) 15 MG tablet Take 1 tablet by mouth Daily. PLEASE CALL THE OFFICE FOR AN APPT 15 tablet 0   • metroNIDAZOLE (Flagyl) 500 MG tablet Take 1 tablet by mouth 2 (Two) Times a Day. 14 tablet 0   • [DISCONTINUED] BD Pen Needle Dory 2nd Gen 32G X 4 MM misc USE AS DIRECTED FOUR TIMES DAILY. 400 each 5   • [DISCONTINUED] Continuous Blood Gluc Sensor (FreeStyle Checo 14 Day Sensor) misc 1 Device by Other route See Admin Instructions. 2 each 1   • [DISCONTINUED] Insulin Lispro, 1 Unit Dial, (HumaLOG KwikPen) 100 UNIT/ML solution pen-injector INJECT THREE TIMES DAILY BEFORE MEAL ON SLIDING SCALE: 3 UNITS(-200), 6U( 201-250), 9U( 251-300), 12U(301-350), 12U(BS>351) MAX 36 U/ DAY 12 mL 0   • [DISCONTINUED] Ozempic, 1 MG/DOSE, 2 MG/1.5ML solution pen-injector INJECT 1 MG UNDER THE SKIN INTO THE APPROPRIATE AREA AS DIRECTED ONCE A WEEK 3 mL 3   • [DISCONTINUED] rosuvastatin (CRESTOR) 20 MG tablet TAKE 1 TABLET BY MOUTH DAILY 30 tablet 5   • [DISCONTINUED] Tresiba FlexTouch 100 UNIT/ML solution pen-injector injection INJECT 10 UNITS UNDER THE SKIN AS DIRECTED EVERY EVENING 15 mL 5     No current facility-administered medications on file prior to visit.       Objective   There were no vitals filed for this visit.  There is no height or weight on file to calculate BMI.    Physical Exam  Vitals and nursing note reviewed.   Constitutional:       Appearance: He is well-developed.   HENT:      Head: Normocephalic and atraumatic.   Neck:      Thyroid: No thyromegaly.      Vascular: No JVD.   Cardiovascular:      Rate and Rhythm:  Normal rate and regular rhythm.      Heart sounds: Normal heart sounds. No murmur heard.  No friction rub. No gallop.    Pulmonary:      Effort: Pulmonary effort is normal. No respiratory distress.      Breath sounds: Normal breath sounds. No wheezing or rales.   Abdominal:      General: Bowel sounds are normal. There is no distension.      Palpations: Abdomen is soft.      Tenderness: There is no abdominal tenderness. There is no guarding or rebound.   Musculoskeletal:      Cervical back: Neck supple.   Skin:     General: Skin is warm and dry.   Neurological:      Mental Status: He is alert.   Psychiatric:         Behavior: Behavior normal.         Assessment/Plan   Diagnoses and all orders for this visit:    1. Type 2 diabetes mellitus with hyperglycemia, with long-term current use of insulin ([facility]) (Primary)  -     Microalbumin / Creatinine Urine Ratio - Urine, Clean Catch  -     Lipid Panel  -     Comprehensive Metabolic Panel  -     Hemoglobin A1c  -     C-Peptide  -     Insulin, Total  -     Glutamic Acid Decarboxylase  -     Ambulatory Referral to Endocrinology  -     Insulin Pen Needle (BD Pen Needle Dory 2nd Gen) 32G X 4 MM misc; Use 1 4x daily  Dispense: 400 each; Refill: 5  -     Continuous Blood Gluc Sensor (FreeStyle Checo 14 Day Sensor) misc; 1 Device by Other route See Admin Instructions.  Dispense: 6 each; Refill: 1  -     Insulin Lispro, 1 Unit Dial, (HumaLOG KwikPen) 100 UNIT/ML solution pen-injector; INJECT THREE TIMES DAILY BEFORE MEAL ON SLIDING SCALE: 3 UNITS(-200), 6U( 201-250), 9U( 251-300), 12U(301-350), 12U(BS>351) MAX 36 U/ DAY  Dispense: 12 mL; Refill: 0  -     Semaglutide, 1 MG/DOSE, (Ozempic, 1 MG/DOSE,) 2 MG/1.5ML solution pen-injector; Inject 1 mg under the skin into the appropriate area as directed 1 (One) Time Per Week.  Dispense: 3 mL; Refill: 3  -     rosuvastatin (CRESTOR) 20 MG tablet; Take 1 tablet by mouth Daily.  Dispense: 30 tablet; Refill: 5  -     insulin degludec  (Tresiba FlexTouch) 100 UNIT/ML solution pen-injector injection; Inject 10 Units under the skin into the appropriate area as directed Every Night.  Dispense: 15 mL; Refill: 5  -     Ambulatory Referral to Diabetic Education    2. Mixed hyperlipidemia  -     Lipid Panel  -     Comprehensive Metabolic Panel  -     Ambulatory Referral to Endocrinology  -     Ambulatory Referral to Diabetic Education    3. Morbidly obese (HCC)  -     Ambulatory Referral to Endocrinology  -     Ambulatory Referral to Diabetic Education    4. Uveitis    5. Chronic left shoulder pain  -     MRI Shoulder Left Without Contrast; Future    shoulder - apap for pain and ice;  Will check MRI;  Has DM2 - ? Adhesive capsulitis  -dm2 - check labs and refer endocrinology; adjust insulin once see a1c and refer to diabetic education.    -HLD - continue statin, recheck lipids.           -Follow up: 3 months and prn

## 2022-01-07 DIAGNOSIS — K21.9 GASTROESOPHAGEAL REFLUX DISEASE WITHOUT ESOPHAGITIS: ICD-10-CM

## 2022-01-07 RX ORDER — FAMOTIDINE 40 MG/1
40 TABLET, FILM COATED ORAL NIGHTLY
Qty: 30 TABLET | Refills: 5 | Status: SHIPPED | OUTPATIENT
Start: 2022-01-07 | End: 2022-06-06 | Stop reason: SDUPTHER

## 2022-01-27 ENCOUNTER — TELEPHONE (OUTPATIENT)
Dept: FAMILY MEDICINE CLINIC | Facility: CLINIC | Age: 37
End: 2022-01-27

## 2022-01-27 DIAGNOSIS — Z79.4 TYPE 2 DIABETES MELLITUS WITHOUT COMPLICATION, WITH LONG-TERM CURRENT USE OF INSULIN: Primary | ICD-10-CM

## 2022-01-27 DIAGNOSIS — E11.9 TYPE 2 DIABETES MELLITUS WITHOUT COMPLICATION, WITH LONG-TERM CURRENT USE OF INSULIN: Primary | ICD-10-CM

## 2022-01-27 RX ORDER — BLOOD SUGAR DIAGNOSTIC
1 STRIP MISCELLANEOUS 4 TIMES DAILY
Qty: 400 EACH | Refills: 12 | Status: SHIPPED | OUTPATIENT
Start: 2022-01-27 | End: 2023-01-17 | Stop reason: SDUPTHER

## 2022-01-27 NOTE — TELEPHONE ENCOUNTER
PT WAS ASKING FOR TEST STRIPS  TO HIS PHARM FOR HIS BLOOD SUGAR     THE OTHER MED FREESTYLE MELANIE IS TOO MUCH WITH WIFE INSURANCE       PT PHONE NUMBER IN CHART IS CORRECT FOR PATIENT

## 2022-03-11 LAB
ALBUMIN SERPL-MCNC: 4 G/DL (ref 4–5)
ALBUMIN/CREAT UR: 39 MG/G CREAT (ref 0–29)
ALBUMIN/GLOB SERPL: 1.3 {RATIO} (ref 1.2–2.2)
ALP SERPL-CCNC: 192 IU/L (ref 44–121)
ALT SERPL-CCNC: 67 IU/L (ref 0–44)
AST SERPL-CCNC: 51 IU/L (ref 0–40)
BILIRUB SERPL-MCNC: 0.4 MG/DL (ref 0–1.2)
BUN SERPL-MCNC: 8 MG/DL (ref 6–20)
BUN/CREAT SERPL: 8 (ref 9–20)
C PEPTIDE SERPL-MCNC: 7.2 NG/ML (ref 1.1–4.4)
CALCIUM SERPL-MCNC: 9.4 MG/DL (ref 8.7–10.2)
CHLORIDE SERPL-SCNC: 100 MMOL/L (ref 96–106)
CHOLEST SERPL-MCNC: 212 MG/DL (ref 100–199)
CO2 SERPL-SCNC: 21 MMOL/L (ref 20–29)
CREAT SERPL-MCNC: 0.95 MG/DL (ref 0.76–1.27)
CREAT UR-MCNC: 58.8 MG/DL
EGFR GENE MUT ANL BLD/T: 107 ML/MIN/1.73
GAD65 AB SER IA-ACNC: <5 U/ML (ref 0–5)
GLOBULIN SER CALC-MCNC: 3 G/DL (ref 1.5–4.5)
GLUCOSE SERPL-MCNC: 254 MG/DL (ref 65–99)
HBA1C MFR BLD: 10.4 % (ref 4.8–5.6)
HDLC SERPL-MCNC: 59 MG/DL
INSULIN SERPL-ACNC: 66.6 UIU/ML (ref 2.6–24.9)
LDLC SERPL CALC-MCNC: 132 MG/DL (ref 0–99)
MICROALBUMIN UR-MCNC: 23 UG/ML
POTASSIUM SERPL-SCNC: 4.2 MMOL/L (ref 3.5–5.2)
PROT SERPL-MCNC: 7 G/DL (ref 6–8.5)
SODIUM SERPL-SCNC: 136 MMOL/L (ref 134–144)
TRIGL SERPL-MCNC: 116 MG/DL (ref 0–149)
VLDLC SERPL CALC-MCNC: 21 MG/DL (ref 5–40)

## 2022-03-14 DIAGNOSIS — E11.65 TYPE 2 DIABETES MELLITUS WITH HYPERGLYCEMIA, WITH LONG-TERM CURRENT USE OF INSULIN: ICD-10-CM

## 2022-03-14 DIAGNOSIS — Z79.4 TYPE 2 DIABETES MELLITUS WITH HYPERGLYCEMIA, WITH LONG-TERM CURRENT USE OF INSULIN: ICD-10-CM

## 2022-03-14 RX ORDER — INSULIN DEGLUDEC INJECTION 100 U/ML
15 INJECTION, SOLUTION SUBCUTANEOUS NIGHTLY
Qty: 15 ML | Refills: 5 | Status: SHIPPED | OUTPATIENT
Start: 2022-03-14 | End: 2022-05-31

## 2022-03-14 RX ORDER — ROSUVASTATIN CALCIUM 40 MG/1
40 TABLET, COATED ORAL NIGHTLY
Qty: 30 TABLET | Refills: 5 | Status: SHIPPED | OUTPATIENT
Start: 2022-03-14 | End: 2022-06-06 | Stop reason: SDUPTHER

## 2022-03-29 DIAGNOSIS — Z79.4 TYPE 2 DIABETES MELLITUS WITH HYPERGLYCEMIA, WITH LONG-TERM CURRENT USE OF INSULIN: ICD-10-CM

## 2022-03-29 DIAGNOSIS — E11.65 TYPE 2 DIABETES MELLITUS WITH HYPERGLYCEMIA, WITH LONG-TERM CURRENT USE OF INSULIN: ICD-10-CM

## 2022-03-30 RX ORDER — INSULIN LISPRO 100 [IU]/ML
INJECTION, SOLUTION INTRAVENOUS; SUBCUTANEOUS
Qty: 12 ML | Refills: 0 | Status: SHIPPED | OUTPATIENT
Start: 2022-03-30 | End: 2022-04-22

## 2022-04-01 PROBLEM — R79.89 ABNORMAL LIVER FUNCTION TESTS: Status: ACTIVE | Noted: 2021-03-11

## 2022-04-01 PROBLEM — Z96.1 PSEUDOPHAKIA OF LEFT EYE: Status: ACTIVE | Noted: 2021-03-05

## 2022-04-01 PROBLEM — H35.359 CYSTOID MACULAR EDEMA: Status: ACTIVE | Noted: 2021-03-08

## 2022-04-01 PROBLEM — D86.9 SARCOIDOSIS: Status: ACTIVE | Noted: 2021-03-05

## 2022-04-01 PROBLEM — R74.8 HIGH GAMMA GLUTAMYL TRANSFERASE (GGT): Status: ACTIVE | Noted: 2020-12-03

## 2022-04-01 PROBLEM — K75.4 AUTOIMMUNE HEPATITIS: Status: ACTIVE | Noted: 2022-04-01

## 2022-04-01 PROBLEM — Z98.890 OTHER SPECIFIED POSTPROCEDURAL STATES: Status: ACTIVE | Noted: 2021-03-11

## 2022-04-01 PROBLEM — H20.9 UVEITIC GLAUCOMA: Status: ACTIVE | Noted: 2021-03-05

## 2022-04-01 PROBLEM — T85.9XXA: Status: ACTIVE | Noted: 2021-10-08

## 2022-04-01 PROBLEM — E11.9 DIABETES MELLITUS (HCC): Status: ACTIVE | Noted: 2020-12-03

## 2022-04-01 PROBLEM — H20.9 UVEITIS: Status: ACTIVE | Noted: 2020-12-03

## 2022-04-01 PROBLEM — Z98.42 CATARACT EXTRACTION STATUS OF LEFT EYE: Status: ACTIVE | Noted: 2021-12-03

## 2022-04-01 PROBLEM — H26.9 CATARACT OF RIGHT EYE: Status: ACTIVE | Noted: 2022-04-01

## 2022-04-01 PROBLEM — Z98.890 OTHER STATES FOLLOWING SURGERY OF EYE AND ADNEXA: Status: ACTIVE | Noted: 2021-09-22

## 2022-04-01 PROBLEM — K21.9 GASTROESOPHAGEAL REFLUX DISEASE: Status: ACTIVE | Noted: 2021-03-11

## 2022-04-01 PROBLEM — H40.40X0 UVEITIC GLAUCOMA: Status: ACTIVE | Noted: 2021-03-05

## 2022-04-01 PROBLEM — H44.113 PANUVEITIS OF BOTH EYES: Status: ACTIVE | Noted: 2021-03-11

## 2022-04-21 DIAGNOSIS — E11.65 TYPE 2 DIABETES MELLITUS WITH HYPERGLYCEMIA, WITH LONG-TERM CURRENT USE OF INSULIN: ICD-10-CM

## 2022-04-21 DIAGNOSIS — Z79.4 TYPE 2 DIABETES MELLITUS WITH HYPERGLYCEMIA, WITH LONG-TERM CURRENT USE OF INSULIN: ICD-10-CM

## 2022-04-22 RX ORDER — INSULIN LISPRO 100 [IU]/ML
INJECTION, SOLUTION INTRAVENOUS; SUBCUTANEOUS
Qty: 12 ML | Refills: 0 | Status: SHIPPED | OUTPATIENT
Start: 2022-04-22 | End: 2022-05-16

## 2022-05-15 DIAGNOSIS — E11.65 TYPE 2 DIABETES MELLITUS WITH HYPERGLYCEMIA, WITH LONG-TERM CURRENT USE OF INSULIN: ICD-10-CM

## 2022-05-15 DIAGNOSIS — Z79.4 TYPE 2 DIABETES MELLITUS WITH HYPERGLYCEMIA, WITH LONG-TERM CURRENT USE OF INSULIN: ICD-10-CM

## 2022-05-16 RX ORDER — INSULIN LISPRO 100 [IU]/ML
INJECTION, SOLUTION INTRAVENOUS; SUBCUTANEOUS
Qty: 12 ML | Refills: 0 | Status: SHIPPED | OUTPATIENT
Start: 2022-05-16 | End: 2022-12-21

## 2022-05-27 NOTE — PROGRESS NOTES
Subjective   Josiah Loomis is a 37 y.o. male.     New pt ref by dr Kodi Rosario for dm 2/ testing bs x day / last dm eye exam 8/16/21  / last dm foot exam today with dr Lee      Patient is a 37-year-old male who was referred for diabetes control.    He has known diabetes mellitus since 2020 and was started on insulin at that time.  EMEKA antibody was negative in March 2022.    He is on Tresiba 15 units every evening, Humalog 3 units per 50 greater than 150 with each meal and Ozempic 1 mg weekly.  He has not used oral diabetic medications before, including metformin.  He has a freestyle dontae 14 sensor in place.  Hemoglobin A1c done in March 2022 is 10.4%.  He has lost 22 pounds since January 2022.  He is not exercising regularly.  He has not seen a diabetes educator.    His last eye examination was in May 2022.  He has uveitis, cataract, and glaucoma.  He had hemorrhage on the left eye and had intraocular injection.  He is receiving Humira through Dr. Gamboa.  Urine microalbumin was elevated in March 2022.  He denies numbness, tingling or burning in his hands or feet.    He has hyperlipidemia and is on Crestor 40 mg a day.  He denies myalgia.  His last meal was last night.    He has hypertension and was taken off lisinopril several years ago after he lost weight.    He has acid reflux disease and is not on Pepcid 40 mg/day he denies heartburn.    He has history of elevated liver enzymes since 2017 and was evaluated by Dr. Dobbs at the [facility].  Hepatitis serology was negative.  He had elevated GGT and ACE levels.  Chest x-ray done in October 2018 was normal.  Liver ultrasound done in September 2020 showed normal liver.  He had a liver biopsy done in March 2021 but results are not available for review.      The following portions of the patient's history were reviewed and updated as appropriate: allergies, current medications, past family history, past medical history, past social history, past  "surgical history and problem list.    Review of Systems   Eyes: Positive for visual disturbance.   Respiratory: Negative for shortness of breath.    Cardiovascular: Negative for chest pain and palpitations.   Gastrointestinal: Negative.    Endocrine: Negative for cold intolerance, heat intolerance and polyuria.   Genitourinary: Negative.    Musculoskeletal: Negative for myalgias.   Neurological: Negative for numbness.     Vitals:    05/31/22 0814   BP: 137/85   Pulse: 82   Temp: 97.3 °F (36.3 °C)   TempSrc: Temporal   SpO2: 99%   Weight: (!) 167 kg (368 lb 6.4 oz)   Height: 182.9 cm (72\")      Objective   Physical Exam  Constitutional:       Appearance: He is obese. He is not ill-appearing.   Eyes:      General: No scleral icterus.        Right eye: No discharge.         Left eye: No discharge.      Extraocular Movements: Extraocular movements intact.   Cardiovascular:      Heart sounds: Normal heart sounds. No murmur heard.  Pulmonary:      Breath sounds: Normal breath sounds.   Abdominal:      General: Bowel sounds are normal.      Tenderness: There is no right CVA tenderness or left CVA tenderness.      Comments: No prominent striae.  No hepatosplenomegaly.   Musculoskeletal:      Right lower leg: No edema.      Left lower leg: No edema.   Skin:     General: Skin is warm.      Comments: Acanthosis nigricans on posterior cervical, posterior elbows and patella.   Neurological:      Mental Status: He is alert.      Comments: Intact light touch in both upper and lower extremities.       Telemedicine on 01/05/2022   Component Date Value Ref Range Status   • Creatinine, Urine 03/09/2022 58.8  Not Estab. mg/dL Final   • Microalbumin, Urine 03/09/2022 23.0  Not Estab. ug/mL Final   • Microalbumin/Creatinine Ratio 03/09/2022 39 (A) 0 - 29 mg/g creat Final    Comment:                        Normal:                0 -  29                         Moderately increased: 30 - 300                         Severely increased:      "  >300     • Total Cholesterol 03/09/2022 212 (A) 100 - 199 mg/dL Final   • Triglycerides 03/09/2022 116  0 - 149 mg/dL Final   • HDL Cholesterol 03/09/2022 59  >39 mg/dL Final   • VLDL Cholesterol Andrew 03/09/2022 21  5 - 40 mg/dL Final   • LDL Chol Calc (NIH) 03/09/2022 132 (A) 0 - 99 mg/dL Final   • Glucose 03/09/2022 254 (A) 65 - 99 mg/dL Final   • BUN 03/09/2022 8  6 - 20 mg/dL Final   • Creatinine 03/09/2022 0.95  0.76 - 1.27 mg/dL Final   • EGFR Result 03/09/2022 107  >59 mL/min/1.73 Final   • BUN/Creatinine Ratio 03/09/2022 8 (A) 9 - 20 Final   • Sodium 03/09/2022 136  134 - 144 mmol/L Final   • Potassium 03/09/2022 4.2  3.5 - 5.2 mmol/L Final   • Chloride 03/09/2022 100  96 - 106 mmol/L Final   • Total CO2 03/09/2022 21  20 - 29 mmol/L Final   • Calcium 03/09/2022 9.4  8.7 - 10.2 mg/dL Final   • Total Protein 03/09/2022 7.0  6.0 - 8.5 g/dL Final   • Albumin 03/09/2022 4.0  4.0 - 5.0 g/dL Final   • Globulin 03/09/2022 3.0  1.5 - 4.5 g/dL Final   • A/G Ratio 03/09/2022 1.3  1.2 - 2.2 Final   • Total Bilirubin 03/09/2022 0.4  0.0 - 1.2 mg/dL Final   • Alkaline Phosphatase 03/09/2022 192 (A) 44 - 121 IU/L Final   • AST (SGOT) 03/09/2022 51 (A) 0 - 40 IU/L Final   • ALT (SGPT) 03/09/2022 67 (A) 0 - 44 IU/L Final   • Hemoglobin A1C 03/09/2022 10.4 (A) 4.8 - 5.6 % Final    Comment:          Prediabetes: 5.7 - 6.4           Diabetes: >6.4           Glycemic control for adults with diabetes: <7.0     • C-Peptide 03/09/2022 7.2 (A) 1.1 - 4.4 ng/mL Final    C-Peptide reference interval is for fasting patients.   • Insulin 03/09/2022 66.6 (A) 2.6 - 24.9 uIU/mL Final   • EMEKA-65 03/09/2022 <5.0  0.0 - 5.0 U/mL Final     Assessment & Plan   Diagnoses and all orders for this visit:    1. Type 2 diabetes mellitus without complication, with long-term current use of insulin (HCC) (Primary)  -     Ambulatory Referral to Diabetic Education  -     Comprehensive Metabolic Panel  -     Microalbumin / Creatinine Urine Ratio - Urine,  Clean Catch  -     Hemoglobin A1c  -     TSH  -     T4, Free    2. Morbidly obese (HCC)    3. Mixed hyperlipidemia  -     Lipid Panel    4. Abnormal liver function tests  -     Gamma GT  -     Angiotensin Converting Enzyme  -     Vitamin D 1,25 Dihydroxy  -     Vitamin D 25 Hydroxy  -     Alkaline Phosphatase, Isoenzymes    5. History of hypertension    6. Type 2 diabetes mellitus with hyperglycemia, with long-term current use of insulin ([facility])  -     insulin degludec (Tresiba FlexTouch) 100 UNIT/ML solution pen-injector injection; Inject 11 Units under the skin into the appropriate area as directed Every Night.  Dispense: 15 mL; Refill: 5      Decrease Tresiba to 11 units every evening.  Continue Ozempic 1 mg weekly.  Continue Humalog as needed.  Consider adding metformin.  Appointment with diabetes educator.  Consider holding Crestor and see if transaminase levels will improve.  Recheck urine microalbumin.  If elevated, consider restarting lisinopril.    Follow-up in 3 months.    Copy of my note sent to Dr. Kodi Rosario, Dr. Gamboa and Dr. Dobbs.  Total time 59 minutes.

## 2022-05-31 ENCOUNTER — OFFICE VISIT (OUTPATIENT)
Dept: ENDOCRINOLOGY | Age: 37
End: 2022-05-31

## 2022-05-31 VITALS
WEIGHT: 315 LBS | TEMPERATURE: 97.3 F | SYSTOLIC BLOOD PRESSURE: 137 MMHG | HEART RATE: 82 BPM | HEIGHT: 72 IN | DIASTOLIC BLOOD PRESSURE: 85 MMHG | OXYGEN SATURATION: 99 % | BODY MASS INDEX: 42.66 KG/M2

## 2022-05-31 DIAGNOSIS — Z86.79 HISTORY OF HYPERTENSION: ICD-10-CM

## 2022-05-31 DIAGNOSIS — E11.9 TYPE 2 DIABETES MELLITUS WITHOUT COMPLICATION, WITH LONG-TERM CURRENT USE OF INSULIN: Primary | ICD-10-CM

## 2022-05-31 DIAGNOSIS — E11.65 TYPE 2 DIABETES MELLITUS WITH HYPERGLYCEMIA, WITH LONG-TERM CURRENT USE OF INSULIN: ICD-10-CM

## 2022-05-31 DIAGNOSIS — E78.2 MIXED HYPERLIPIDEMIA: ICD-10-CM

## 2022-05-31 DIAGNOSIS — Z79.4 TYPE 2 DIABETES MELLITUS WITH HYPERGLYCEMIA, WITH LONG-TERM CURRENT USE OF INSULIN: ICD-10-CM

## 2022-05-31 DIAGNOSIS — R79.89 ABNORMAL LIVER FUNCTION TESTS: ICD-10-CM

## 2022-05-31 DIAGNOSIS — Z79.4 TYPE 2 DIABETES MELLITUS WITHOUT COMPLICATION, WITH LONG-TERM CURRENT USE OF INSULIN: Primary | ICD-10-CM

## 2022-05-31 DIAGNOSIS — E66.01 MORBIDLY OBESE: ICD-10-CM

## 2022-05-31 PROCEDURE — 99205 OFFICE O/P NEW HI 60 MIN: CPT | Performed by: INTERNAL MEDICINE

## 2022-05-31 RX ORDER — ADALIMUMAB 40MG/0.4ML
40 KIT SUBCUTANEOUS ONCE
COMMUNITY

## 2022-05-31 RX ORDER — INSULIN DEGLUDEC INJECTION 100 U/ML
11 INJECTION, SOLUTION SUBCUTANEOUS NIGHTLY
Qty: 15 ML | Refills: 5 | Status: SHIPPED | OUTPATIENT
Start: 2022-05-31 | End: 2022-12-28 | Stop reason: SDUPTHER

## 2022-06-01 NOTE — TELEPHONE ENCOUNTER
Pt is aware.    Dapsone Counseling: I discussed with the patient the risks of dapsone including but not limited to hemolytic anemia, agranulocytosis, rashes, methemoglobinemia, kidney failure, peripheral neuropathy, headaches, GI upset, and liver toxicity.  Patients who start dapsone require monitoring including baseline LFTs and weekly CBCs for the first month, then every month thereafter.  The patient verbalized understanding of the proper use and possible adverse effects of dapsone.  All of the patient's questions and concerns were addressed.

## 2022-06-02 DIAGNOSIS — E11.65 TYPE 2 DIABETES MELLITUS WITH HYPERGLYCEMIA, WITH LONG-TERM CURRENT USE OF INSULIN: ICD-10-CM

## 2022-06-02 DIAGNOSIS — Z79.4 TYPE 2 DIABETES MELLITUS WITH HYPERGLYCEMIA, WITH LONG-TERM CURRENT USE OF INSULIN: ICD-10-CM

## 2022-06-02 LAB
1,25(OH)2D SERPL-MCNC: 74.1 PG/ML (ref 19.9–79.3)
25(OH)D3+25(OH)D2 SERPL-MCNC: 7.9 NG/ML (ref 30–100)
ACE SERPL-CCNC: 127 U/L (ref 14–82)
ALBUMIN SERPL-MCNC: 4.3 G/DL (ref 4–5)
ALBUMIN/CREAT UR: 23 MG/G CREAT (ref 0–29)
ALBUMIN/GLOB SERPL: 1.4 {RATIO} (ref 1.2–2.2)
ALP BONE CFR SERPL: 17 % (ref 12–68)
ALP INTEST CFR SERPL: 0 % (ref 0–18)
ALP LIVER CFR SERPL: 83 % (ref 13–88)
ALP SERPL-CCNC: 278 IU/L (ref 44–121)
ALT SERPL-CCNC: 65 IU/L (ref 0–44)
AST SERPL-CCNC: 77 IU/L (ref 0–40)
BILIRUB SERPL-MCNC: 0.8 MG/DL (ref 0–1.2)
BUN SERPL-MCNC: 8 MG/DL (ref 6–20)
BUN/CREAT SERPL: 8 (ref 9–20)
CALCIUM SERPL-MCNC: 10 MG/DL (ref 8.7–10.2)
CHLORIDE SERPL-SCNC: 100 MMOL/L (ref 96–106)
CHOLEST SERPL-MCNC: 193 MG/DL (ref 100–199)
CO2 SERPL-SCNC: 22 MMOL/L (ref 20–29)
CREAT SERPL-MCNC: 1.03 MG/DL (ref 0.76–1.27)
CREAT UR-MCNC: 201.8 MG/DL
EGFRCR SERPLBLD CKD-EPI 2021: 97 ML/MIN/1.73
GGT SERPL-CCNC: 382 IU/L (ref 0–65)
GLOBULIN SER CALC-MCNC: 3 G/DL (ref 1.5–4.5)
GLUCOSE SERPL-MCNC: 100 MG/DL (ref 65–99)
HBA1C MFR BLD: 8.4 % (ref 4.8–5.6)
HDLC SERPL-MCNC: 37 MG/DL
IMP & REVIEW OF LAB RESULTS: NORMAL
LDLC SERPL CALC-MCNC: 133 MG/DL (ref 0–99)
MICROALBUMIN UR-MCNC: 46.9 UG/ML
POTASSIUM SERPL-SCNC: 4.1 MMOL/L (ref 3.5–5.2)
PROT SERPL-MCNC: 7.3 G/DL (ref 6–8.5)
SODIUM SERPL-SCNC: 138 MMOL/L (ref 134–144)
T4 FREE SERPL-MCNC: 1.37 NG/DL (ref 0.82–1.77)
TRIGL SERPL-MCNC: 129 MG/DL (ref 0–149)
TSH SERPL DL<=0.005 MIU/L-ACNC: 1.65 UIU/ML (ref 0.45–4.5)
VLDLC SERPL CALC-MCNC: 23 MG/DL (ref 5–40)

## 2022-06-03 RX ORDER — FLASH GLUCOSE SENSOR
KIT MISCELLANEOUS
Qty: 6 EACH | Refills: 1 | Status: SHIPPED | OUTPATIENT
Start: 2022-06-03 | End: 2022-06-13

## 2022-06-05 DIAGNOSIS — R79.89 ABNORMAL LIVER FUNCTION TESTS: ICD-10-CM

## 2022-06-05 DIAGNOSIS — E55.9 VITAMIN D DEFICIENCY: ICD-10-CM

## 2022-06-05 DIAGNOSIS — E78.5 DYSLIPIDEMIA: Primary | ICD-10-CM

## 2022-06-06 DIAGNOSIS — E78.5 DYSLIPIDEMIA: Primary | ICD-10-CM

## 2022-06-06 DIAGNOSIS — K21.9 GASTROESOPHAGEAL REFLUX DISEASE WITHOUT ESOPHAGITIS: ICD-10-CM

## 2022-06-06 RX ORDER — FAMOTIDINE 40 MG/1
40 TABLET, FILM COATED ORAL NIGHTLY
Qty: 90 TABLET | Refills: 0 | Status: SHIPPED | OUTPATIENT
Start: 2022-06-06 | End: 2022-12-28 | Stop reason: SDUPTHER

## 2022-06-06 RX ORDER — ROSUVASTATIN CALCIUM 40 MG/1
40 TABLET, COATED ORAL NIGHTLY
Qty: 90 TABLET | Refills: 0 | Status: SHIPPED | OUTPATIENT
Start: 2022-06-06 | End: 2022-09-09

## 2022-06-13 DIAGNOSIS — Z79.4 TYPE 2 DIABETES MELLITUS WITH HYPERGLYCEMIA, WITH LONG-TERM CURRENT USE OF INSULIN: ICD-10-CM

## 2022-06-13 DIAGNOSIS — E11.65 TYPE 2 DIABETES MELLITUS WITH HYPERGLYCEMIA, WITH LONG-TERM CURRENT USE OF INSULIN: ICD-10-CM

## 2022-06-13 RX ORDER — FLASH GLUCOSE SENSOR
KIT MISCELLANEOUS
Qty: 2 EACH | Refills: 0 | Status: SHIPPED | OUTPATIENT
Start: 2022-06-13 | End: 2022-07-11

## 2022-07-11 DIAGNOSIS — E11.65 TYPE 2 DIABETES MELLITUS WITH HYPERGLYCEMIA, WITH LONG-TERM CURRENT USE OF INSULIN: ICD-10-CM

## 2022-07-11 DIAGNOSIS — Z79.4 TYPE 2 DIABETES MELLITUS WITH HYPERGLYCEMIA, WITH LONG-TERM CURRENT USE OF INSULIN: ICD-10-CM

## 2022-07-11 RX ORDER — FLASH GLUCOSE SENSOR
KIT MISCELLANEOUS
Qty: 2 EACH | Refills: 0 | Status: SHIPPED | OUTPATIENT
Start: 2022-07-11 | End: 2022-08-02

## 2022-08-02 DIAGNOSIS — E11.65 TYPE 2 DIABETES MELLITUS WITH HYPERGLYCEMIA, WITH LONG-TERM CURRENT USE OF INSULIN: ICD-10-CM

## 2022-08-02 DIAGNOSIS — Z79.4 TYPE 2 DIABETES MELLITUS WITH HYPERGLYCEMIA, WITH LONG-TERM CURRENT USE OF INSULIN: ICD-10-CM

## 2022-08-02 RX ORDER — FLASH GLUCOSE SENSOR
KIT MISCELLANEOUS
Qty: 2 EACH | Refills: 0 | Status: SHIPPED | OUTPATIENT
Start: 2022-08-02 | End: 2022-09-07

## 2022-08-30 DIAGNOSIS — K21.9 GASTROESOPHAGEAL REFLUX DISEASE WITHOUT ESOPHAGITIS: ICD-10-CM

## 2022-08-30 RX ORDER — FAMOTIDINE 40 MG/1
TABLET, FILM COATED ORAL
Qty: 90 TABLET | Refills: 3 | OUTPATIENT
Start: 2022-08-30

## 2022-09-07 DIAGNOSIS — Z79.4 TYPE 2 DIABETES MELLITUS WITH HYPERGLYCEMIA, WITH LONG-TERM CURRENT USE OF INSULIN: ICD-10-CM

## 2022-09-07 DIAGNOSIS — E11.65 TYPE 2 DIABETES MELLITUS WITH HYPERGLYCEMIA, WITH LONG-TERM CURRENT USE OF INSULIN: ICD-10-CM

## 2022-09-07 RX ORDER — FLASH GLUCOSE SENSOR
KIT MISCELLANEOUS
Qty: 2 EACH | Refills: 0 | Status: SHIPPED | OUTPATIENT
Start: 2022-09-07 | End: 2022-12-21

## 2022-09-09 DIAGNOSIS — E78.5 DYSLIPIDEMIA: ICD-10-CM

## 2022-09-09 RX ORDER — ROSUVASTATIN CALCIUM 40 MG/1
40 TABLET, COATED ORAL NIGHTLY
Qty: 15 TABLET | Refills: 0 | Status: SHIPPED | OUTPATIENT
Start: 2022-09-09 | End: 2022-12-28 | Stop reason: SDUPTHER

## 2022-12-21 DIAGNOSIS — Z79.4 TYPE 2 DIABETES MELLITUS WITH HYPERGLYCEMIA, WITH LONG-TERM CURRENT USE OF INSULIN: ICD-10-CM

## 2022-12-21 DIAGNOSIS — E11.65 TYPE 2 DIABETES MELLITUS WITH HYPERGLYCEMIA, WITH LONG-TERM CURRENT USE OF INSULIN: ICD-10-CM

## 2022-12-21 RX ORDER — INSULIN LISPRO 100 [IU]/ML
INJECTION, SOLUTION INTRAVENOUS; SUBCUTANEOUS
Qty: 12 ML | Refills: 0 | Status: SHIPPED | OUTPATIENT
Start: 2022-12-21 | End: 2023-01-17 | Stop reason: SDUPTHER

## 2022-12-21 RX ORDER — FLASH GLUCOSE SENSOR
KIT MISCELLANEOUS
Qty: 2 EACH | Refills: 0 | Status: SHIPPED | OUTPATIENT
Start: 2022-12-21 | End: 2023-01-17 | Stop reason: SDUPTHER

## 2022-12-28 ENCOUNTER — TELEPHONE (OUTPATIENT)
Dept: FAMILY MEDICINE CLINIC | Facility: CLINIC | Age: 37
End: 2022-12-28

## 2022-12-28 DIAGNOSIS — Z79.4 TYPE 2 DIABETES MELLITUS WITH HYPERGLYCEMIA, WITH LONG-TERM CURRENT USE OF INSULIN: ICD-10-CM

## 2022-12-28 DIAGNOSIS — K21.9 GASTROESOPHAGEAL REFLUX DISEASE WITHOUT ESOPHAGITIS: ICD-10-CM

## 2022-12-28 DIAGNOSIS — E11.65 TYPE 2 DIABETES MELLITUS WITH HYPERGLYCEMIA, WITH LONG-TERM CURRENT USE OF INSULIN: ICD-10-CM

## 2022-12-28 DIAGNOSIS — E78.5 DYSLIPIDEMIA: ICD-10-CM

## 2022-12-28 RX ORDER — INSULIN DEGLUDEC INJECTION 100 U/ML
11 INJECTION, SOLUTION SUBCUTANEOUS NIGHTLY
Qty: 15 ML | Refills: 5 | Status: SHIPPED | OUTPATIENT
Start: 2022-12-28 | End: 2023-01-17 | Stop reason: SDUPTHER

## 2022-12-28 RX ORDER — SEMAGLUTIDE 1.34 MG/ML
1 INJECTION, SOLUTION SUBCUTANEOUS WEEKLY
Qty: 3 ML | Refills: 3 | Status: SHIPPED | OUTPATIENT
Start: 2022-12-28 | End: 2023-01-17

## 2022-12-28 RX ORDER — ROSUVASTATIN CALCIUM 40 MG/1
40 TABLET, COATED ORAL NIGHTLY
Qty: 30 TABLET | Refills: 0 | Status: SHIPPED | OUTPATIENT
Start: 2022-12-28 | End: 2023-01-17 | Stop reason: SDUPTHER

## 2022-12-28 RX ORDER — FAMOTIDINE 40 MG/1
40 TABLET, FILM COATED ORAL NIGHTLY
Qty: 90 TABLET | Refills: 0 | Status: SHIPPED | OUTPATIENT
Start: 2022-12-28 | End: 2023-01-17 | Stop reason: SDUPTHER

## 2022-12-28 NOTE — TELEPHONE ENCOUNTER
ol     Caller: Brigid Avila    Relationship to patient: Emergency Contact    Best call back number: [phone]    Chief complaint: MEDICATION REFILLS    Type of visit: OFFICE    Requested date: SOMETIME IN January

## 2023-01-10 DIAGNOSIS — E11.65 TYPE 2 DIABETES MELLITUS WITH HYPERGLYCEMIA, WITH LONG-TERM CURRENT USE OF INSULIN: ICD-10-CM

## 2023-01-10 DIAGNOSIS — Z79.4 TYPE 2 DIABETES MELLITUS WITH HYPERGLYCEMIA, WITH LONG-TERM CURRENT USE OF INSULIN: ICD-10-CM

## 2023-01-10 RX ORDER — PEN NEEDLE, DIABETIC 32GX 5/32"
NEEDLE, DISPOSABLE MISCELLANEOUS
Qty: 400 EACH | Refills: 5 | Status: SHIPPED | OUTPATIENT
Start: 2023-01-10 | End: 2023-01-17 | Stop reason: SDUPTHER

## 2023-01-17 ENCOUNTER — OFFICE VISIT (OUTPATIENT)
Dept: FAMILY MEDICINE CLINIC | Facility: CLINIC | Age: 38
End: 2023-01-17

## 2023-01-17 VITALS
HEART RATE: 110 BPM | OXYGEN SATURATION: 96 % | SYSTOLIC BLOOD PRESSURE: 130 MMHG | BODY MASS INDEX: 42.66 KG/M2 | DIASTOLIC BLOOD PRESSURE: 98 MMHG | WEIGHT: 315 LBS | HEIGHT: 72 IN

## 2023-01-17 DIAGNOSIS — Z79.4 TYPE 2 DIABETES MELLITUS WITH HYPERGLYCEMIA, WITH LONG-TERM CURRENT USE OF INSULIN: Primary | ICD-10-CM

## 2023-01-17 DIAGNOSIS — K21.9 GASTROESOPHAGEAL REFLUX DISEASE WITHOUT ESOPHAGITIS: ICD-10-CM

## 2023-01-17 DIAGNOSIS — E11.65 TYPE 2 DIABETES MELLITUS WITH HYPERGLYCEMIA, WITH LONG-TERM CURRENT USE OF INSULIN: Primary | ICD-10-CM

## 2023-01-17 DIAGNOSIS — E78.5 DYSLIPIDEMIA: ICD-10-CM

## 2023-01-17 PROCEDURE — 99213 OFFICE O/P EST LOW 20 MIN: CPT | Performed by: FAMILY MEDICINE

## 2023-01-17 RX ORDER — INSULIN LISPRO 100 [IU]/ML
INJECTION, SOLUTION INTRAVENOUS; SUBCUTANEOUS
Qty: 12 ML | Refills: 0 | Status: SHIPPED | OUTPATIENT
Start: 2023-01-17 | End: 2023-02-16

## 2023-01-17 RX ORDER — FAMOTIDINE 40 MG/1
40 TABLET, FILM COATED ORAL NIGHTLY
Qty: 90 TABLET | Refills: 1 | Status: SHIPPED | OUTPATIENT
Start: 2023-01-17

## 2023-01-17 RX ORDER — FLASH GLUCOSE SENSOR
1 KIT MISCELLANEOUS SEE ADMIN INSTRUCTIONS
Qty: 6 EACH | Refills: 3 | Status: SHIPPED | OUTPATIENT
Start: 2023-01-17

## 2023-01-17 RX ORDER — ROSUVASTATIN CALCIUM 40 MG/1
40 TABLET, COATED ORAL NIGHTLY
Qty: 90 TABLET | Refills: 1 | Status: SHIPPED | OUTPATIENT
Start: 2023-01-17

## 2023-01-17 RX ORDER — SEMAGLUTIDE 1.34 MG/ML
INJECTION, SOLUTION SUBCUTANEOUS
Qty: 1.5 ML | Refills: 0 | Status: SHIPPED | OUTPATIENT
Start: 2023-01-17

## 2023-01-17 RX ORDER — INSULIN DEGLUDEC INJECTION 100 U/ML
11 INJECTION, SOLUTION SUBCUTANEOUS NIGHTLY
Qty: 15 ML | Refills: 5 | Status: SHIPPED | OUTPATIENT
Start: 2023-01-17

## 2023-01-17 RX ORDER — PEN NEEDLE, DIABETIC 32GX 5/32"
NEEDLE, DISPOSABLE MISCELLANEOUS
Qty: 400 EACH | Refills: 5 | Status: SHIPPED | OUTPATIENT
Start: 2023-01-17

## 2023-01-17 NOTE — PATIENT INSTRUCTIONS
"\"3-7-1-Almost None!\"  Healthy Habits Start Early    EAT 5 OR MORE SERVINGS OF VEGETABLES AND FRUITS EVERY DAY.    Help Josiah get three vegetables and two fruits each day. Red, green, yellow, orange...encourage them to try all the colors so they can enjoy different flavors and get more vitamins.    How can I help Josiah do this?  ---------------------------------------------  -BE PATIENT WITH Josiah, remember it may take 10 times before they start to like new food. So, start with small bites and just keep trying.  -Serve at least one vegetable or fruit at every meal. Even try two. Remember, portions do not have to be as big as you think.  -Encourage eating fruits and vegetables instead of drinking them..it's a better way to get fiber and vitamins..so limit the amount of juice to 1/2 cup per day for children 1-6 years and one cup per day for children 7-18 years of age. Try using 1/2 part water and 1/2 part juice.    Spend less than two hours per day watching television and other screen media. Screen media includes video games, movies and computer use for entertainment.    How can I help Josiah do this?  -Turn off the TV at dinner. Dinner is the best time to hang out with your kids and just talk, learn about their day, and tell them about your day. Your kids have a lot to learn from you and dinner is a great time to share.  "

## 2023-01-17 NOTE — PROGRESS NOTES
Subjective   Josiah Loomis is a 38 y.o. male. Presents today for   Chief Complaint   Patient presents with   • Diabetes       History of Present Illness  Patient here for dm2 check;  Has [employer] and [employer] not taking currently, self pay;   Off all meds as lost insurance;   Needs back asap;  Needs new endocrinologist not in [employer] due to insurance.   Review of Systems   Eyes: Positive for visual disturbance.   Respiratory: Negative for shortness of breath.    Cardiovascular: Negative for chest pain and palpitations.       Patient Active Problem List   Diagnosis   • Sprain of acromioclavicular ligament   • Atopic dermatitis   • Dyslipidemia   • Abdominal hernia   • Morbidly obese (HCC)   • Type 2 diabetes mellitus without complication, with long-term current use of insulin (HCC)   • Hyperlipidemia   • Abnormal liver function tests   • Autoimmune hepatitis (HCC)   • Cataract extraction status of left eye   • Cataract of right eye   • Cystoid macular edema   • Diabetes mellitus (HCC)   • Disorder due to insertion of glaucoma drainage device   • Gastroesophageal reflux disease   • High gamma glutamyl transferase (GGT)   • Other specified postprocedural states   • Other states following surgery of eye and adnexa   • Pseudophakia of left eye   • Panuveitis of both eyes   • Sarcoidosis   • Uveitic glaucoma   • Uveitis       Social History     Socioeconomic History   • Marital status:    Tobacco Use   • Smoking status: Never   • Smokeless tobacco: Never   Substance and Sexual Activity   • Alcohol use: Yes     Comment: rarely   • Drug use: No   • Sexual activity: Defer       No Known Allergies    Current Outpatient Medications on File Prior to Visit   Medication Sig Dispense Refill   • Adalimumab (Humira) 40 MG/0.4ML Prefilled Syringe Kit injection Inject 40 mg under the skin into the appropriate area as directed 1 (One) Time.     • Cholecalciferol (vitamin D3) 125 MCG (5000 UT) tablet tablet Take 1 tablet by mouth  "Daily. 90 tablet 1   • Continuous Blood Gluc Sensor (FreeStyle Checo 14 Day Sensor) misc USE AS DIRECTED 2 each 0   • famotidine (PEPCID) 40 MG tablet Take 1 tablet by mouth Every Night. 90 tablet 0   • fluorometholone (FML) 0.1 % ophthalmic suspension      • glucose blood (Accu-Chek Guide) test strip 1 each by Other route 4 (Four) Times a Day. Use as instructed. Dx. E11.9 400 each 12   • HumaLOG KwikPen 100 UNIT/ML solution pen-injector INJECT UP TO 36 UNITS DAILY VIA SLIDING SCALE(3U)150-200, 6U(201-250), 9U(251-300), 12 USE(301-351) 12 mL 0   • insulin degludec (Tresiba FlexTouch) 100 UNIT/ML solution pen-injector injection Inject 11 Units under the skin into the appropriate area as directed Every Night. 15 mL 5   • Insulin Pen Needle (BD Pen Needle Dory 2nd Gen) 32G X 4 MM misc USE AS DIRECTED FOUR TIMES DAILY 400 each 5   • Lancets 30G misc 1 Device 4 (Four) Times a Day. Dx. E11.9 400 each 12   • rosuvastatin (CRESTOR) 40 MG tablet Take 1 tablet by mouth Every Night. 30 tablet 0   • Semaglutide, 1 MG/DOSE, (Ozempic, 1 MG/DOSE,) 2 MG/1.5ML solution pen-injector Inject 1 mg under the skin into the appropriate area as directed 1 (One) Time Per Week. 3 mL 3   • TIMOLOL-DORZOLAMID-LATANOPROST OP Apply  to eye(s) as directed by provider.       No current facility-administered medications on file prior to visit.       Objective   Vitals:    01/17/23 1356   BP: 130/98   Pulse: 110   SpO2: 96%   Weight: (!) 159 kg (350 lb)   Height: 182.9 cm (72\")     Body mass index is 47.47 kg/m².    Physical Exam  Vitals and nursing note reviewed.   Constitutional:       Appearance: He is well-developed.   HENT:      Head: Normocephalic and atraumatic.   Neck:      Thyroid: No thyromegaly.      Vascular: No JVD.   Cardiovascular:      Rate and Rhythm: Normal rate and regular rhythm.      Heart sounds: Normal heart sounds. No murmur heard.    No friction rub. No gallop.   Pulmonary:      Effort: Pulmonary effort is normal. No " respiratory distress.      Breath sounds: Normal breath sounds. No wheezing or rales.   Abdominal:      General: Bowel sounds are normal. There is no distension.      Palpations: Abdomen is soft.      Tenderness: There is no abdominal tenderness. There is no guarding or rebound.   Musculoskeletal:      Cervical back: Neck supple.   Feet:      Comments: Diabetic foot exam and monofilament completed, see scanned report.      Skin:     General: Skin is warm and dry.   Neurological:      Mental Status: He is alert.   Psychiatric:         Behavior: Behavior normal.         Assessment & Plan   Diagnoses and all orders for this visit:    1. Type 2 diabetes mellitus with hyperglycemia, with long-term current use of insulin (HCC) (Primary)  -     Ambulatory Referral to Endocrinology      Recommend establish with endocrionlogy, sent referral to [facility] as [facility] not active on [employer]  Self pay today  Recommend up date labs, restart meds  Work on weight loss       -Follow up: 6 months and prn

## 2023-01-18 LAB
ALBUMIN SERPL-MCNC: 4.5 G/DL (ref 4–5)
ALBUMIN/CREAT UR: 68 MG/G CREAT (ref 0–29)
ALBUMIN/GLOB SERPL: 1.4 {RATIO} (ref 1.2–2.2)
ALP SERPL-CCNC: 224 IU/L (ref 44–121)
ALT SERPL-CCNC: 95 IU/L (ref 0–44)
AST SERPL-CCNC: 79 IU/L (ref 0–40)
BILIRUB SERPL-MCNC: 0.6 MG/DL (ref 0–1.2)
BUN SERPL-MCNC: 10 MG/DL (ref 6–20)
BUN/CREAT SERPL: 10 (ref 9–20)
CALCIUM SERPL-MCNC: 10.3 MG/DL (ref 8.7–10.2)
CHLORIDE SERPL-SCNC: 98 MMOL/L (ref 96–106)
CHOLEST SERPL-MCNC: 365 MG/DL (ref 100–199)
CO2 SERPL-SCNC: 25 MMOL/L (ref 20–29)
CREAT SERPL-MCNC: 1.03 MG/DL (ref 0.76–1.27)
CREAT UR-MCNC: 61.8 MG/DL
EGFRCR SERPLBLD CKD-EPI 2021: 97 ML/MIN/1.73
GLOBULIN SER CALC-MCNC: 3.2 G/DL (ref 1.5–4.5)
GLUCOSE SERPL-MCNC: 326 MG/DL (ref 70–99)
HBA1C MFR BLD: 11.6 % (ref 4.8–5.6)
HDLC SERPL-MCNC: 57 MG/DL
LABORATORY COMMENT REPORT: ABNORMAL
LDLC SERPL CALC-MCNC: 235 MG/DL (ref 0–99)
MICROALBUMIN UR-MCNC: 41.9 UG/ML
POTASSIUM SERPL-SCNC: 4.3 MMOL/L (ref 3.5–5.2)
PROT SERPL-MCNC: 7.7 G/DL (ref 6–8.5)
SODIUM SERPL-SCNC: 140 MMOL/L (ref 134–144)
TRIGL SERPL-MCNC: 340 MG/DL (ref 0–149)
VLDLC SERPL CALC-MCNC: 73 MG/DL (ref 5–40)

## 2023-01-21 ENCOUNTER — TELEPHONE (OUTPATIENT)
Dept: FAMILY MEDICINE CLINIC | Facility: CLINIC | Age: 38
End: 2023-01-21
Payer: MEDICAID

## 2023-01-21 NOTE — PROGRESS NOTES
Call results to patient.  Cholesterol and diabetes very uncontrolled - restart medications as planned.  Let know how doing on blood sugars in 2 to 4 weeks while awaiting to get into endocrinology.  Liver enzymes up, work on weigh tloss, will need recheck at next ov.

## 2023-02-16 ENCOUNTER — TELEPHONE (OUTPATIENT)
Dept: FAMILY MEDICINE CLINIC | Facility: CLINIC | Age: 38
End: 2023-02-16

## 2023-02-16 RX ORDER — INSULIN ASPART 100 [IU]/ML
INJECTION, SOLUTION INTRAVENOUS; SUBCUTANEOUS
Qty: 33 ML | Refills: 3 | Status: SHIPPED | OUTPATIENT
Start: 2023-02-16

## 2023-02-16 NOTE — TELEPHONE ENCOUNTER
Asking about Humalog. Insurance wanting the switch to Novalog.     Ph# [phone] (patient)     Asking if fax from [employer] was received? [facility].

## 2023-02-20 DIAGNOSIS — Z79.4 TYPE 2 DIABETES MELLITUS WITH HYPERGLYCEMIA, WITH LONG-TERM CURRENT USE OF INSULIN: Primary | ICD-10-CM

## 2023-02-20 DIAGNOSIS — E11.65 TYPE 2 DIABETES MELLITUS WITH HYPERGLYCEMIA, WITH LONG-TERM CURRENT USE OF INSULIN: Primary | ICD-10-CM

## 2023-02-20 RX ORDER — INSULIN LISPRO 100 U/ML
INJECTION, SOLUTION SUBCUTANEOUS
Qty: 33 ML | Refills: 12 | Status: SHIPPED | OUTPATIENT
Start: 2023-02-20

## 2023-06-15 ENCOUNTER — TELEPHONE (OUTPATIENT)
Dept: FAMILY MEDICINE CLINIC | Facility: CLINIC | Age: 38
End: 2023-06-15

## 2023-06-15 DIAGNOSIS — E11.9 TYPE 2 DIABETES MELLITUS WITHOUT COMPLICATION, WITH LONG-TERM CURRENT USE OF INSULIN: Primary | ICD-10-CM

## 2023-06-15 DIAGNOSIS — Z79.4 TYPE 2 DIABETES MELLITUS WITHOUT COMPLICATION, WITH LONG-TERM CURRENT USE OF INSULIN: Primary | ICD-10-CM

## 2023-06-15 RX ORDER — BLOOD-GLUCOSE SENSOR
1 EACH MISCELLANEOUS
Qty: 8 EACH | Refills: 0 | Status: SHIPPED | OUTPATIENT
Start: 2023-06-15

## 2023-06-15 RX ORDER — BLOOD-GLUCOSE SENSOR
1 EACH MISCELLANEOUS
COMMUNITY
End: 2023-06-15 | Stop reason: SDUPTHER

## 2023-06-15 NOTE — TELEPHONE ENCOUNTER
PATIENTS SPOUSE IS CALLING THEY WANT TO GET THE [employer] LINDA 3, THE PHARMACY SHOULD BE [id number] - [place], [place] - [address] - [phone] [facility] [phone]   [phone]   WANTS THE ONE THAT YOU PUT ON  YOUR ARM.

## 2023-07-18 PROBLEM — H50.111 EXOTROPIA OF RIGHT EYE: Status: ACTIVE | Noted: 2023-01-30

## 2023-08-14 ENCOUNTER — DOCUMENTATION (OUTPATIENT)
Dept: FAMILY MEDICINE CLINIC | Facility: CLINIC | Age: 38
End: 2023-08-14
Payer: COMMERCIAL

## 2023-08-17 ENCOUNTER — TELEPHONE (OUTPATIENT)
Dept: FAMILY MEDICINE CLINIC | Facility: CLINIC | Age: 38
End: 2023-08-17
Payer: COMMERCIAL

## 2023-08-17 NOTE — TELEPHONE ENCOUNTER
Caller: [facility]    Relationship: Other    Best call back number: [phone]     What was the call regarding: THEY FAXED AN ORDER FORM FOR A [email or web address] LINDA AND RECEIVED IT BACK BUT IT WAS NEEDING CORRECTIONS. THEY FAXED A NEW FORM ON 08/14/2023 AND THEY ARE CALLING TO CONFIRM IF IT HAS BEEN RECEIVED. PLEASE ADVISE.

## 2023-08-23 DIAGNOSIS — E78.5 DYSLIPIDEMIA: ICD-10-CM

## 2023-08-23 RX ORDER — ROSUVASTATIN CALCIUM 40 MG/1
TABLET, COATED ORAL
Qty: 90 TABLET | Refills: 0 | Status: SHIPPED | OUTPATIENT
Start: 2023-08-23

## 2023-08-24 ENCOUNTER — TELEPHONE (OUTPATIENT)
Dept: FAMILY MEDICINE CLINIC | Facility: CLINIC | Age: 38
End: 2023-08-24
Payer: COMMERCIAL

## 2023-08-24 NOTE — TELEPHONE ENCOUNTER
Caller: [facility]    Relationship: RIGOBERTO    Best call back number: [phone]*    What was the call regarding: RIGOBERTO WITH [facility] CALLING STATING THAT THE FORM THEY RECEIVED FROM DR. CHAUDHRY, HAD ERRORS IN SECTION II, THERE WERE WORDS CROSSED OUT, AND THEY CANNOT ACCEPT THE FORM AND HAS BEEN MARKED AS INVALID. RIGOBERTO STATES THAT ANOTHER FORM HAS BEEN FAXED TO THE OFFICE ON 8/14 FOR COMPLETION. RIGOBERTO STATES SHE WILL FAX THE FORMS AGAIN, AND REQUEST SECTION II CORRECTED AND FAXED BACK TO [facility] WHEN COMPLETED.

## 2023-09-22 DIAGNOSIS — K21.9 GASTROESOPHAGEAL REFLUX DISEASE WITHOUT ESOPHAGITIS: ICD-10-CM

## 2023-09-22 RX ORDER — FAMOTIDINE 40 MG/1
TABLET, FILM COATED ORAL
Qty: 90 TABLET | Refills: 0 | Status: SHIPPED | OUTPATIENT
Start: 2023-09-22

## 2023-10-26 ENCOUNTER — TELEPHONE (OUTPATIENT)
Dept: FAMILY MEDICINE CLINIC | Facility: CLINIC | Age: 38
End: 2023-10-26

## 2023-10-26 NOTE — TELEPHONE ENCOUNTER
Pt had an apt today with Dr. Rosario and missed it. His wife called to reschedule. Patient does not want to seen ISAIAH and wants to reschedule with Abraham.     Monday and Tuesday mornings or Anytime on a Wednesday or Thursday work better for him. Requesting sooner than I can schedule.     Please advise where to schedule this patient.

## 2023-11-14 ENCOUNTER — APPOINTMENT (OUTPATIENT)
Dept: GENERAL RADIOLOGY | Facility: HOSPITAL | Age: 38
End: 2023-11-14
Payer: COMMERCIAL

## 2023-11-14 ENCOUNTER — HOSPITAL ENCOUNTER (EMERGENCY)
Facility: HOSPITAL | Age: 38
Discharge: HOME | End: 2023-11-14
Attending: EMERGENCY MEDICINE | Admitting: EMERGENCY MEDICINE
Payer: COMMERCIAL

## 2023-11-14 VITALS
BODY MASS INDEX: 41.75 KG/M2 | WEIGHT: 315 LBS | TEMPERATURE: 97.8 F | RESPIRATION RATE: 20 BRPM | HEIGHT: 73 IN | DIASTOLIC BLOOD PRESSURE: 96 MMHG | SYSTOLIC BLOOD PRESSURE: 115 MMHG | OXYGEN SATURATION: 100 % | HEART RATE: 87 BPM

## 2023-11-14 DIAGNOSIS — R07.9 CHEST PAIN, UNSPECIFIED TYPE: Primary | ICD-10-CM

## 2023-11-14 DIAGNOSIS — R10.13 EPIGASTRIC PAIN: ICD-10-CM

## 2023-11-14 LAB
ALBUMIN SERPL-MCNC: 4.3 G/DL (ref 3.5–5.2)
ALBUMIN/GLOB SERPL: 1.3 G/DL
ALP SERPL-CCNC: 179 U/L (ref 39–117)
ALT SERPL W P-5'-P-CCNC: 63 U/L (ref 1–41)
ANION GAP SERPL CALCULATED.3IONS-SCNC: 8.2 MMOL/L (ref 5–15)
AST SERPL-CCNC: 58 U/L (ref 1–40)
BASOPHILS # BLD AUTO: 0.04 10*3/MM3 (ref 0–0.2)
BASOPHILS NFR BLD AUTO: 0.5 % (ref 0–1.5)
BILIRUB SERPL-MCNC: 0.7 MG/DL (ref 0–1.2)
BUN SERPL-MCNC: 6 MG/DL (ref 6–20)
BUN/CREAT SERPL: 5.7 (ref 7–25)
CALCIUM SPEC-SCNC: 9.3 MG/DL (ref 8.6–10.5)
CHLORIDE SERPL-SCNC: 101 MMOL/L (ref 98–107)
CO2 SERPL-SCNC: 28.8 MMOL/L (ref 22–29)
CREAT SERPL-MCNC: 1.05 MG/DL (ref 0.76–1.27)
DEPRECATED RDW RBC AUTO: 37.3 FL (ref 37–54)
EGFRCR SERPLBLD CKD-EPI 2021: 94.3 ML/MIN/1.73
EOSINOPHIL # BLD AUTO: 0.13 10*3/MM3 (ref 0–0.4)
EOSINOPHIL NFR BLD AUTO: 1.5 % (ref 0.3–6.2)
ERYTHROCYTE [DISTWIDTH] IN BLOOD BY AUTOMATED COUNT: 13.4 % (ref 12.3–15.4)
GEN 5 2HR TROPONIN T REFLEX: 9 NG/L
GLOBULIN UR ELPH-MCNC: 3.4 GM/DL
GLUCOSE SERPL-MCNC: 140 MG/DL (ref 65–99)
HCT VFR BLD AUTO: 45.3 % (ref 37.5–51)
HGB BLD-MCNC: 15.3 G/DL (ref 13–17.7)
HOLD SPECIMEN: NORMAL
HOLD SPECIMEN: NORMAL
IMM GRANULOCYTES # BLD AUTO: 0.02 10*3/MM3 (ref 0–0.05)
IMM GRANULOCYTES NFR BLD AUTO: 0.2 % (ref 0–0.5)
LIPASE SERPL-CCNC: 54 U/L (ref 13–60)
LYMPHOCYTES # BLD AUTO: 1.06 10*3/MM3 (ref 0.7–3.1)
LYMPHOCYTES NFR BLD AUTO: 12.2 % (ref 19.6–45.3)
MCH RBC QN AUTO: 26.3 PG (ref 26.6–33)
MCHC RBC AUTO-ENTMCNC: 33.8 G/DL (ref 31.5–35.7)
MCV RBC AUTO: 77.8 FL (ref 79–97)
MONOCYTES # BLD AUTO: 0.49 10*3/MM3 (ref 0.1–0.9)
MONOCYTES NFR BLD AUTO: 5.6 % (ref 5–12)
NEUTROPHILS NFR BLD AUTO: 6.94 10*3/MM3 (ref 1.7–7)
NEUTROPHILS NFR BLD AUTO: 80 % (ref 42.7–76)
NRBC BLD AUTO-RTO: 0 /100 WBC (ref 0–0.2)
PLATELET # BLD AUTO: 251 10*3/MM3 (ref 140–450)
PMV BLD AUTO: 10 FL (ref 6–12)
POTASSIUM SERPL-SCNC: 4.1 MMOL/L (ref 3.5–5.2)
PROT SERPL-MCNC: 7.7 G/DL (ref 6–8.5)
QT INTERVAL: 369 MS
QTC INTERVAL: 426 MS
RBC # BLD AUTO: 5.82 10*6/MM3 (ref 4.14–5.8)
SODIUM SERPL-SCNC: 138 MMOL/L (ref 136–145)
TROPONIN T DELTA: 2 NG/L
TROPONIN T SERPL HS-MCNC: 7 NG/L
WBC NRBC COR # BLD: 8.68 10*3/MM3 (ref 3.4–10.8)
WHOLE BLOOD HOLD COAG: NORMAL
WHOLE BLOOD HOLD SPECIMEN: NORMAL

## 2023-11-14 PROCEDURE — 96374 THER/PROPH/DIAG INJ IV PUSH: CPT

## 2023-11-14 PROCEDURE — 93005 ELECTROCARDIOGRAM TRACING: CPT

## 2023-11-14 PROCEDURE — 36415 COLL VENOUS BLD VENIPUNCTURE: CPT

## 2023-11-14 PROCEDURE — 71045 X-RAY EXAM CHEST 1 VIEW: CPT

## 2023-11-14 PROCEDURE — 25010000002 ONDANSETRON PER 1 MG: Performed by: PHYSICIAN ASSISTANT

## 2023-11-14 PROCEDURE — 85025 COMPLETE CBC W/AUTO DIFF WBC: CPT | Performed by: PHYSICIAN ASSISTANT

## 2023-11-14 PROCEDURE — 99284 EMERGENCY DEPT VISIT MOD MDM: CPT

## 2023-11-14 PROCEDURE — 80053 COMPREHEN METABOLIC PANEL: CPT | Performed by: PHYSICIAN ASSISTANT

## 2023-11-14 PROCEDURE — 83690 ASSAY OF LIPASE: CPT | Performed by: EMERGENCY MEDICINE

## 2023-11-14 PROCEDURE — 93010 ELECTROCARDIOGRAM REPORT: CPT | Performed by: INTERNAL MEDICINE

## 2023-11-14 PROCEDURE — 84484 ASSAY OF TROPONIN QUANT: CPT | Performed by: PHYSICIAN ASSISTANT

## 2023-11-14 PROCEDURE — 96375 TX/PRO/DX INJ NEW DRUG ADDON: CPT

## 2023-11-14 RX ORDER — LIDOCAINE HYDROCHLORIDE 20 MG/ML
10 SOLUTION OROPHARYNGEAL ONCE
Status: COMPLETED | OUTPATIENT
Start: 2023-11-14 | End: 2023-11-14

## 2023-11-14 RX ORDER — ALUMINA, MAGNESIA, AND SIMETHICONE 2400; 2400; 240 MG/30ML; MG/30ML; MG/30ML
15 SUSPENSION ORAL ONCE
Status: COMPLETED | OUTPATIENT
Start: 2023-11-14 | End: 2023-11-14

## 2023-11-14 RX ORDER — ONDANSETRON 4 MG/1
4-8 TABLET, FILM COATED ORAL
COMMUNITY
Start: 2023-10-30

## 2023-11-14 RX ORDER — AZATHIOPRINE 50 MG/1
50 TABLET ORAL DAILY
COMMUNITY
Start: 2023-10-17 | End: 2023-11-17 | Stop reason: HOSPADM

## 2023-11-14 RX ORDER — ACETAMINOPHEN 500 MG
1000 TABLET ORAL ONCE
Status: COMPLETED | OUTPATIENT
Start: 2023-11-14 | End: 2023-11-14

## 2023-11-14 RX ORDER — SODIUM CHLORIDE 0.9 % (FLUSH) 0.9 %
10 SYRINGE (ML) INJECTION AS NEEDED
Status: DISCONTINUED | OUTPATIENT
Start: 2023-11-14 | End: 2023-11-14 | Stop reason: HOSPADM

## 2023-11-14 RX ORDER — PREDNISONE 5 MG/1
TABLET ORAL
COMMUNITY
Start: 2023-10-12

## 2023-11-14 RX ORDER — ONDANSETRON 2 MG/ML
4 INJECTION INTRAMUSCULAR; INTRAVENOUS ONCE
Status: COMPLETED | OUTPATIENT
Start: 2023-11-14 | End: 2023-11-14

## 2023-11-14 RX ORDER — FAMOTIDINE 10 MG/ML
20 INJECTION, SOLUTION INTRAVENOUS ONCE
Status: COMPLETED | OUTPATIENT
Start: 2023-11-14 | End: 2023-11-14

## 2023-11-14 RX ADMIN — LIDOCAINE HYDROCHLORIDE 10 ML: 20 SOLUTION ORAL at 12:13

## 2023-11-14 RX ADMIN — ONDANSETRON 4 MG: 2 INJECTION INTRAMUSCULAR; INTRAVENOUS at 08:21

## 2023-11-14 RX ADMIN — ACETAMINOPHEN 1000 MG: 500 TABLET ORAL at 08:21

## 2023-11-14 RX ADMIN — ALUMINUM HYDROXIDE, MAGNESIUM HYDROXIDE, AND DIMETHICONE 15 ML: 400; 400; 40 SUSPENSION ORAL at 12:13

## 2023-11-14 RX ADMIN — FAMOTIDINE 20 MG: 10 INJECTION INTRAVENOUS at 08:21

## 2023-11-14 NOTE — ED PROVIDER NOTES
EMERGENCY DEPARTMENT ENCOUNTER    Room Number:  05/05  PCP: Kodi Rosario DO  Discussed/ obtained information from independent historians: patient      HPI:  Chief Complaint: chest pain  A complete HPI/ROS/PMH/PSH/SH/FH are unobtainable due to: none  Context: Josiah Loomis is a 38 y.o. male with a history of diabetes, hypertension, hyperlipidemia, GERD, obesity .  Who presents to the ED c/o chest pain that awoke him from sleep but 2 AM.  It is essentially constant.  He states every 20 seconds or so he has an episode of sharp epigastric and lower sternal pain that lasts about 45 seconds.  He also reports nausea and 5 episodes of vomiting after the pain began.  He remains a little nauseated currently.  He denies any other abdominal discomfort, cough congestion fever chills or other upper respiratory symptoms.  Denies any hematuria dysuria urinary frequency.  He does state about a week ago he saw a rheumatology and was started on azathioprine which caused some nausea and he was started on Zofran for that with good relief.  He denies any previous abdominal surgeries.  He also states he is on prednisone daily. No recent trauma/surgery/immobilization in the past 4 weeks. No h/o VTE. No hemoptysis. No unilateral leg swelling. No exogenous estrogen use.   Age < 50. HR < 100. SpO2 >94% on room air.      External (non-ED) record review: Office visit with rheumatology on 10/17/2023.  History of uveitis.  Was noted to have been off Humira for last 3 weeks due to a medication change.  It was felt the patient may have sarcoidosis and azathioprine was, continued on Humira.      PAST MEDICAL HISTORY  Active Ambulatory Problems     Diagnosis Date Noted    Sprain of acromioclavicular ligament 05/26/2016    Atopic dermatitis 05/26/2016    Dyslipidemia 05/26/2016    Abdominal hernia 05/26/2016    Morbidly obese 11/16/2018    Type 2 diabetes mellitus without complication, with long-term current use of insulin 08/22/2019     Hyperlipidemia 08/20/2020    Abnormal liver function tests 03/11/2021    Autoimmune hepatitis 04/01/2022    Cataract extraction status of left eye 12/03/2021    Cataract of right eye 04/01/2022    Cystoid macular edema 03/08/2021    Diabetes mellitus 12/03/2020    Disorder due to insertion of glaucoma drainage device 10/08/2021    Gastroesophageal reflux disease 03/11/2021    High gamma glutamyl transferase (GGT) 12/03/2020    Other specified postprocedural states 03/11/2021    Other states following surgery of eye and adnexa 09/22/2021    Pseudophakia of left eye 03/05/2021    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      FAMILY HISTORY  Family History   Problem Relation Age of Onset    Crohn's disease Father     Diabetes Maternal Grandmother     Diabetes Paternal Grandmother          SOCIAL HISTORY  Social History     Socioeconomic History    Marital status:    Tobacco Use    Smoking status: Never    Smokeless tobacco: Never   Substance and Sexual Activity    Alcohol use: Yes     Comment: rarely    Drug use: No    Sexual activity: Defer         ALLERGIES  Patient has no known allergies.        REVIEW OF SYSTEMS  Review of Systems         PHYSICAL EXAM  ED Triage Vitals   Temp Heart Rate Resp BP SpO2   11/14/23 0746 11/14/23 0746 11/14/23 0746 11/14/23 0804 11/14/23 0746   97.8 °F (36.6 °C) 88 20 (!) 158/107 96 %      Temp src Heart Rate Source Patient Position BP Location FiO2 (%)   11/14/23 0746 11/14/23 0746 -- -- --   Tympanic Monitor          Physical Exam      GENERAL: no acute distress  HENT: normocephalic, atraumatic  EYES: no  scleral icterus  CV: regular rhythm, normal rate  RESPIRATORY: normal effort, CTA B, no chest wall tenderness  ABDOMEN: nondistended soft nontender normal bowel sounds no guarding or rigidity  MUSCULOSKELETAL: no deformity  NEURO: alert, moves all extremities, follows commands  PSYCH:  calm, cooperative  SKIN: warm, dry    Vital signs and nursing notes reviewed.          LAB RESULTS  Recent Results (from the past 24 hour(s))   ECG 12 Lead Chest Pain    Collection Time: 11/14/23  7:50 AM   Result Value Ref Range    QT Interval 369 ms    QTC Interval 426 ms   Green Top (Gel)    Collection Time: 11/14/23  8:04 AM   Result Value Ref Range    Extra Tube Hold for add-ons.    Lavender Top    Collection Time: 11/14/23  8:04 AM   Result Value Ref Range    Extra Tube hold for add-on    Gold Top - SST    Collection Time: 11/14/23  8:04 AM   Result Value Ref Range    Extra Tube Hold for add-ons.    Light Blue Top    Collection Time: 11/14/23  8:04 AM   Result Value Ref Range    Extra Tube Hold for add-ons.    Comprehensive Metabolic Panel    Collection Time: 11/14/23  8:04 AM    Specimen: Blood   Result Value Ref Range    Glucose 140 (H) 65 - 99 mg/dL    BUN 6 6 - 20 mg/dL    Creatinine 1.05 0.76 - 1.27 mg/dL    Sodium 138 136 - 145 mmol/L    Potassium 4.1 3.5 - 5.2 mmol/L    Chloride 101 98 - 107 mmol/L    CO2 28.8 22.0 - 29.0 mmol/L    Calcium 9.3 8.6 - 10.5 mg/dL    Total Protein 7.7 6.0 - 8.5 g/dL    Albumin 4.3 3.5 - 5.2 g/dL    ALT (SGPT) 63 (H) 1 - 41 U/L    AST (SGOT) 58 (H) 1 - 40 U/L    Alkaline Phosphatase 179 (H) 39 - 117 U/L    Total Bilirubin 0.7 0.0 - 1.2 mg/dL    Globulin 3.4 gm/dL    A/G Ratio 1.3 g/dL    BUN/Creatinine Ratio 5.7 (L) 7.0 - 25.0    Anion Gap 8.2 5.0 - 15.0 mmol/L    eGFR 94.3 >60.0 mL/min/1.73   High Sensitivity Troponin T    Collection Time: 11/14/23  8:04 AM    Specimen: Blood   Result Value Ref Range    HS Troponin T 7 <22 ng/L   CBC Auto Differential    Collection Time: 11/14/23  8:04 AM     Specimen: Blood   Result Value Ref Range    WBC 8.68 3.40 - 10.80 10*3/mm3    RBC 5.82 (H) 4.14 - 5.80 10*6/mm3    Hemoglobin 15.3 13.0 - 17.7 g/dL    Hematocrit 45.3 37.5 - 51.0 %    MCV 77.8 (L) 79.0 - 97.0 fL    MCH 26.3 (L) 26.6 - 33.0 pg    MCHC 33.8 31.5 - 35.7 g/dL    RDW 13.4 12.3 - 15.4 %    RDW-SD 37.3 37.0 - 54.0 fl    MPV 10.0 6.0 - 12.0 fL    Platelets 251 140 - 450 10*3/mm3    Neutrophil % 80.0 (H) 42.7 - 76.0 %    Lymphocyte % 12.2 (L) 19.6 - 45.3 %    Monocyte % 5.6 5.0 - 12.0 %    Eosinophil % 1.5 0.3 - 6.2 %    Basophil % 0.5 0.0 - 1.5 %    Immature Grans % 0.2 0.0 - 0.5 %    Neutrophils, Absolute 6.94 1.70 - 7.00 10*3/mm3    Lymphocytes, Absolute 1.06 0.70 - 3.10 10*3/mm3    Monocytes, Absolute 0.49 0.10 - 0.90 10*3/mm3    Eosinophils, Absolute 0.13 0.00 - 0.40 10*3/mm3    Basophils, Absolute 0.04 0.00 - 0.20 10*3/mm3    Immature Grans, Absolute 0.02 0.00 - 0.05 10*3/mm3    nRBC 0.0 0.0 - 0.2 /100 WBC   Lipase    Collection Time: 11/14/23  8:04 AM    Specimen: Blood   Result Value Ref Range    Lipase 54 13 - 60 U/L   High Sensitivity Troponin T 2Hr    Collection Time: 11/14/23 10:06 AM    Specimen: Arm, Left; Blood   Result Value Ref Range    HS Troponin T 9 <22 ng/L    Troponin T Delta 2 >=-4 - <+4 ng/L       Ordered the above labs and reviewed the results.        RADIOLOGY  XR Chest 1 View    Result Date: 11/14/2023  EXAM: XR CHEST 1 VW-  INDICATION: Chest pain  COMPARISON: None available       No focal consolidation. No pleural effusion or pneumothorax.  Normal size cardiomediastinal silhouette.  No focal osseous abnormality.    This report was finalized on 11/14/2023 8:22 AM by Dr. Al Dougherty M.D on Workstation: [email or web address]       Ordered the above noted radiological studies. Reviewed by me in PACS.            PROCEDURES  Procedures              MEDICATIONS GIVEN IN ER  Medications   famotidine (PEPCID) injection 20 mg (20 mg Intravenous Given 11/14/23 0821)   ondansetron (ZOFRAN) injection  4 mg (4 mg Intravenous Given 23 08)   acetaminophen (TYLENOL) tablet 1,000 mg (1,000 mg Oral Given 23 08)   aluminum-magnesium hydroxide-simethicone (MAALOX MAX) 400-400-40 MG/5ML suspension 15 mL (15 mL Oral Given 23 1213)   Lidocaine Viscous HCl (XYLOCAINE) 2 % solution 10 mL (10 mL Mouth/Throat Given 23 1213)                   MEDICAL DECISION MAKING, PROGRESS, and CONSULTS    All labs have been independently reviewed by me.  All radiology studies have been reviewed by me and I have also reviewed the radiology report.   EKG's independently viewed and interpreted by me.  Discussion below represents my analysis of pertinent findings related to patient's condition, differential diagnosis, treatment plan and final disposition.            Orders placed during this visit:  Orders Placed This Encounter   Procedures    XR Chest 1 View    [place] Draw    Comprehensive Metabolic Panel    High Sensitivity Troponin T    CBC Auto Differential    Lipase    High Sensitivity Troponin T 2Hr    Ambulatory Referral to Cardiology    Continuous Pulse Oximetry    Vital Signs    ECG 12 Lead Chest Pain    Green Top (Gel)    Lavender Top    Gold Top - SST    Light Blue Top    CBC & Differential           Differential diagnosis:  DDx includes but is not limited to acute coronary syndrome, pulmonary embolism, thoracic aortic dissection, pneumonia, pneumothorax, musculoskeletal pain, GERD or esophageal spasm, anxiety, myocarditis/pericarditis, esophageal rupture, pancreatitis.     History: 0  EC  Age: 2  Risk factors: 2    HEAR score: 2        Independent interpretation of labs, radiology studies, and discussions with consultants:  ED Course as of 23 1543      075 EKG ER MD interpretation   Time: 7: 50  Rhythm and rate: Normal sinus rhythm at a rate of 80  Axis: Normal  P waves: Normal  QRS complexes: Normal  ST segments: no elevation nor depressions  T waves: no flattening or  inversions   [AR]   0829 I viewed patient's chest x-ray and on my interpretation patient has clear lungs and normal heart size [AR]   0847 WBC: 8.68 [KA]   0847 Hemoglobin: 15.3 [KA]   1050 HS Troponin T: 9 [KA]   1147 HEART Score: 2 (11/14/2023 11:47 AM)   [KA]      ED Course User Index  [AR] Jacqueline Bee MD  [KA] Anabel Bob PA-C       Reassessed the patient, he is resting, feels much improved, symptoms nearly resolved.  He does take Pepcid once daily, recommended increasing to twice daily and recommended p.o. antacids for recurrent symptoms.  He does have risk factors for heart disease though ischemic work-up unremarkable today, have recommended follow-up with PCP and cardiology for further evaluation.  He is agreeable.  I encouraged him to return to the ER for any new or worsening symptoms or any concerns at all and he is agreeable with this plan.    - Shared decision making: Recommended discharge and follow-up with cardiology and PCP, patient agreeable    Additional orders considered but not ordered:  I considered admitting the patient however he is PERC negative, heart score low at 2, ischemic work-up negative and symptoms atypical for cardiac pain.      Additional sources:    - Chronic or social conditions impacting care: Chronic immunosuppression, autoimmune disease, many comorbidities          DIAGNOSIS  Final diagnoses:   Chest pain, unspecified type   Epigastric pain           Follow Up:  Kodi Rosario DO  [address]  [facility] [id number] [phone]    In 2 days      [facility] CARDIOLOGY  [address]  [place] [address] [phone]        [facility] EMERGENCY DEPARTMENT  [address]  [facility] [id number] [phone]    If symptoms worsen or any concerns      RX:     Medication List      No changes were made to your prescriptions during this visit.         Latest Documented Vital Signs:  As of 15:43  EST  BP- 115/96 HR- 87 Temp- 97.8 °F (36.6 °C) (Tympanic) O2 sat- 100%              --    Please note that portions of this were completed with a voice recognition program.       Note Disclaimer: At [facility], we believe that sharing information builds trust and better relationships. You are receiving this note because you are receiving care at [facility] or recently visited. It is possible you will see health information before a provider has talked with you about it. This kind of information can be easy to misunderstand. To help you fully understand what it means for your health, we urge you to discuss this note with your provider.             Anabel Bob PA-C  11/14/23 1543

## 2023-11-14 NOTE — DISCHARGE INSTRUCTIONS
Activities as tolerated.  Recheck with your primary care provider in 2 days and follow up with [facility], call asap to schedule.  Return to the ER for increasing frequency or severity of chest pain, feeling lightheaded or passing out, shortness of breath, any concerns.

## 2023-11-14 NOTE — ED PROVIDER NOTES
MD ATTESTATION NOTE    The ANALISA and I have discussed this patient's history, physical exam, and treatment plan.  I have reviewed the documentation and personally had a face to face interaction with the patient. I affirm the documentation and agree with the treatment and plan.  The attached note describes my personal findings.    I provided a substantive portion of the care of this patient. I personally performed the physical exam, in its entirety.    Independent Historians: Patient    A complete HPI/ROS/PMH/PSH/SH/FH are unobtainable due to: None    Chronic or social conditions impacting patient care (social determinants of health): None    Josiah Loomis is a 38 y.o. male with diabetes and hypertension as well as hyperlipidemia and autoimmune hepatitis, possible sarcoidosis, and chronic panuveitis of both eyes who presents to the ED c/o acute episode of repeated vomiting and chest and epigastric discomfort.  Patient ate a normal meal last night not reporting any bad foods nor anything particularly heavy or acidic then awoke with epigastric discomfort that was sharp and stabbing.  The pain lasts just a few seconds but recurs every few minutes.  Patient then had an episode of repeated vomiting and still feels a little bit nauseous now.  Patient denies any recent illnesses as far as fever, cough, diarrhea.  Patient denies any sick contacts as well.  Patient denies a history of ulcers, pancreatitis, or gallbladder disease but does still have a gallbladder.  Patient has been on Ozempic since January with no dosage change recently.  Patient has tolerated the medication well.  Patient did have the addition of azathioprine recently which did cause him additional nausea so he was given a Zofran as needed prescription to.          On exam:  GENERAL: Morbidly obese -American male, cooperative and conversant, alert, no acute distress  SKIN: Warm, dry  HENT: Normocephalic, atraumatic  EYES: no scleral icterus, EOM abnormal  chronically  CV: regular rhythm, regular rate, no murmur  RESPIRATORY: normal effort, lungs clear, diminished in the bases but no wheeze or rhonchi detected  ABDOMEN: soft, nontender, nondistended, morbidly obese, no rebound, no guarding  MUSCULOSKELETAL: no deformity  NEURO: alert, moves all extremities, follows commands                                                             Labs  Recent Results (from the past 24 hour(s))   ECG 12 Lead Chest Pain    Collection Time: 11/14/23  7:50 AM   Result Value Ref Range    QT Interval 369 ms    QTC Interval 426 ms   Green Top (Gel)    Collection Time: 11/14/23  8:04 AM   Result Value Ref Range    Extra Tube Hold for add-ons.    Lavender Top    Collection Time: 11/14/23  8:04 AM   Result Value Ref Range    Extra Tube hold for add-on    Gold Top - SST    Collection Time: 11/14/23  8:04 AM   Result Value Ref Range    Extra Tube Hold for add-ons.    Light Blue Top    Collection Time: 11/14/23  8:04 AM   Result Value Ref Range    Extra Tube Hold for add-ons.    Comprehensive Metabolic Panel    Collection Time: 11/14/23  8:04 AM    Specimen: Blood   Result Value Ref Range    Glucose 140 (H) 65 - 99 mg/dL    BUN 6 6 - 20 mg/dL    Creatinine 1.05 0.76 - 1.27 mg/dL    Sodium 138 136 - 145 mmol/L    Potassium 4.1 3.5 - 5.2 mmol/L    Chloride 101 98 - 107 mmol/L    CO2 28.8 22.0 - 29.0 mmol/L    Calcium 9.3 8.6 - 10.5 mg/dL    Total Protein 7.7 6.0 - 8.5 g/dL    Albumin 4.3 3.5 - 5.2 g/dL    ALT (SGPT) 63 (H) 1 - 41 U/L    AST (SGOT) 58 (H) 1 - 40 U/L    Alkaline Phosphatase 179 (H) 39 - 117 U/L    Total Bilirubin 0.7 0.0 - 1.2 mg/dL    Globulin 3.4 gm/dL    A/G Ratio 1.3 g/dL    BUN/Creatinine Ratio 5.7 (L) 7.0 - 25.0    Anion Gap 8.2 5.0 - 15.0 mmol/L    eGFR 94.3 >60.0 mL/min/1.73   High Sensitivity Troponin T    Collection Time: 11/14/23  8:04 AM    Specimen: Blood   Result Value Ref Range    HS Troponin T 7 <22 ng/L   CBC Auto Differential    Collection Time: 11/14/23  8:04 AM     Specimen: Blood   Result Value Ref Range    WBC 8.68 3.40 - 10.80 10*3/mm3    RBC 5.82 (H) 4.14 - 5.80 10*6/mm3    Hemoglobin 15.3 13.0 - 17.7 g/dL    Hematocrit 45.3 37.5 - 51.0 %    MCV 77.8 (L) 79.0 - 97.0 fL    MCH 26.3 (L) 26.6 - 33.0 pg    MCHC 33.8 31.5 - 35.7 g/dL    RDW 13.4 12.3 - 15.4 %    RDW-SD 37.3 37.0 - 54.0 fl    MPV 10.0 6.0 - 12.0 fL    Platelets 251 140 - 450 10*3/mm3    Neutrophil % 80.0 (H) 42.7 - 76.0 %    Lymphocyte % 12.2 (L) 19.6 - 45.3 %    Monocyte % 5.6 5.0 - 12.0 %    Eosinophil % 1.5 0.3 - 6.2 %    Basophil % 0.5 0.0 - 1.5 %    Immature Grans % 0.2 0.0 - 0.5 %    Neutrophils, Absolute 6.94 1.70 - 7.00 10*3/mm3    Lymphocytes, Absolute 1.06 0.70 - 3.10 10*3/mm3    Monocytes, Absolute 0.49 0.10 - 0.90 10*3/mm3    Eosinophils, Absolute 0.13 0.00 - 0.40 10*3/mm3    Basophils, Absolute 0.04 0.00 - 0.20 10*3/mm3    Immature Grans, Absolute 0.02 0.00 - 0.05 10*3/mm3    nRBC 0.0 0.0 - 0.2 /100 WBC   Lipase    Collection Time: 11/14/23  8:04 AM    Specimen: Blood   Result Value Ref Range    Lipase 54 13 - 60 U/L   High Sensitivity Troponin T 2Hr    Collection Time: 11/14/23 10:06 AM    Specimen: Arm, Left; Blood   Result Value Ref Range    HS Troponin T 9 <22 ng/L    Troponin T Delta 2 >=-4 - <+4 ng/L       Radiology  XR Chest 1 View    Result Date: 11/14/2023  EXAM: XR CHEST 1 VW-  INDICATION: Chest pain  COMPARISON: None available       No focal consolidation. No pleural effusion or pneumothorax.  Normal size cardiomediastinal silhouette.  No focal osseous abnormality.    This report was finalized on 11/14/2023 8:22 AM by Dr. Al Dougherty M.D on Workstation: [email or web address]       Medical Decision Making:  ED Course as of 11/14/23 1502   Tue Nov 14, 2023   0752 EKG ER MD interpretation   Time: 7: 50  Rhythm and rate: Normal sinus rhythm at a rate of 80  Axis: Normal  P waves: Normal  QRS complexes: Normal  ST segments: no elevation nor depressions  T waves: no flattening or inversions   [AR]    0823 I viewed patient's chest x-ray and on my interpretation patient has clear lungs and normal heart size [AR]   0847 WBC: 8.68 [KA]   0847 Hemoglobin: 15.3 [KA]   1050 HS Troponin T: 9 [KA]   1147 HEART Score: 2 (11/14/2023 11:47 AM)   [KA]      ED Course User Index  [AR] Jacqueline Bee MD  [KA] Anabel Bob PA-C       MDM: The differential diagnosis for this patient includes gastritis, PUD, H. Pylori infection, atypical ACS, pancreatitis, cholecystitis/ biliary colic, esophagitis, esophageal spasm, appendicitis, sbo, pneumonia/pneumothorax, hepatitis, AAA, Aortic dissection, bowel perforation, and malignancy. Abdominal exam is without peritoneal signs. There is no evidence of acute abdomen at this time. The patient is well appearing. I have a low suspicion for vascular catastrophe, bowel obstruction or viscus perforation. Plan serum labs including LFTs, troponin, and lipase, antiemetic, pain control (ppi +/- gi cocktail) and serial reassessment.    Procedures:  Procedures        PPE: I followed hospital protocols for proper PPE based on patient presentation including use of N95 mask for suspected infectious respiratory conditions.  Proper hand hygiene was performed both before and after the patient encounter.          Diagnosis  Final diagnoses:   Chest pain, unspecified type   Epigastric pain       Note Disclaimer: At [facility], we believe that sharing information builds trust and better relationships. You are receiving this note because you recently visited [facility]. It is possible you will see health information before a provider has talked with you about it. This kind of information can be easy to misunderstand. To help you fully understand what it means for your health, we urge you to discuss this note with your provider.       Jacqueline Bee MD  11/14/23 3849

## 2023-11-14 NOTE — ED NOTES
"Pt arrives ambulatory to triage for chest pain that started at 0200 this morning. Pt rates the midsternal pain 10/10. Pt also reports several episodes of vomiting. Pt states he \"felt like something was stuck in my esophagus.\"   "

## 2023-11-15 ENCOUNTER — APPOINTMENT (OUTPATIENT)
Dept: GENERAL RADIOLOGY | Facility: HOSPITAL | Age: 38
End: 2023-11-15
Payer: COMMERCIAL

## 2023-11-15 ENCOUNTER — HOSPITAL ENCOUNTER (OUTPATIENT)
Facility: HOSPITAL | Age: 38
Setting detail: OBSERVATION
Discharge: HOME | End: 2023-11-17
Attending: EMERGENCY MEDICINE | Admitting: INTERNAL MEDICINE
Payer: COMMERCIAL

## 2023-11-15 ENCOUNTER — APPOINTMENT (OUTPATIENT)
Dept: ULTRASOUND IMAGING | Facility: HOSPITAL | Age: 38
End: 2023-11-15
Payer: COMMERCIAL

## 2023-11-15 DIAGNOSIS — R00.0 TACHYCARDIA: ICD-10-CM

## 2023-11-15 DIAGNOSIS — T78.40XA ALLERGIC REACTION, INITIAL ENCOUNTER: ICD-10-CM

## 2023-11-15 DIAGNOSIS — R73.9 HYPERGLYCEMIA: ICD-10-CM

## 2023-11-15 DIAGNOSIS — R50.9 FEVER, UNSPECIFIED FEVER CAUSE: Primary | ICD-10-CM

## 2023-11-15 DIAGNOSIS — D72.829 LEUKOCYTOSIS, UNSPECIFIED TYPE: ICD-10-CM

## 2023-11-15 LAB
ALBUMIN SERPL-MCNC: 3.8 G/DL (ref 3.5–5.2)
ALBUMIN/GLOB SERPL: 1.3 G/DL
ALP SERPL-CCNC: 172 U/L (ref 39–117)
ALT SERPL W P-5'-P-CCNC: 64 U/L (ref 1–41)
ANION GAP SERPL CALCULATED.3IONS-SCNC: 12 MMOL/L (ref 5–15)
AST SERPL-CCNC: 45 U/L (ref 1–40)
BACTERIA UR QL AUTO: ABNORMAL /HPF
BASOPHILS # BLD AUTO: 0.02 10*3/MM3 (ref 0–0.2)
BASOPHILS NFR BLD AUTO: 0.1 % (ref 0–1.5)
BILIRUB SERPL-MCNC: 1.3 MG/DL (ref 0–1.2)
BILIRUB UR QL STRIP: NEGATIVE
BUN SERPL-MCNC: 5 MG/DL (ref 6–20)
BUN/CREAT SERPL: 5 (ref 7–25)
CALCIUM SPEC-SCNC: 9.2 MG/DL (ref 8.6–10.5)
CHLORIDE SERPL-SCNC: 103 MMOL/L (ref 98–107)
CLARITY UR: ABNORMAL
CO2 SERPL-SCNC: 21 MMOL/L (ref 22–29)
COLOR UR: YELLOW
CREAT SERPL-MCNC: 1.01 MG/DL (ref 0.76–1.27)
D-LACTATE SERPL-SCNC: 0.9 MMOL/L (ref 0.5–2)
DEPRECATED RDW RBC AUTO: 38.6 FL (ref 37–54)
EGFRCR SERPLBLD CKD-EPI 2021: 98.8 ML/MIN/1.73
EOSINOPHIL # BLD AUTO: 0.03 10*3/MM3 (ref 0–0.4)
EOSINOPHIL NFR BLD AUTO: 0.2 % (ref 0.3–6.2)
ERYTHROCYTE [DISTWIDTH] IN BLOOD BY AUTOMATED COUNT: 13.4 % (ref 12.3–15.4)
GLOBULIN UR ELPH-MCNC: 3 GM/DL
GLUCOSE BLDC GLUCOMTR-MCNC: 151 MG/DL (ref 70–130)
GLUCOSE SERPL-MCNC: 135 MG/DL (ref 65–99)
GLUCOSE UR STRIP-MCNC: NEGATIVE MG/DL
HCT VFR BLD AUTO: 50.3 % (ref 37.5–51)
HETEROPH AB SER QL LA: NEGATIVE
HGB BLD-MCNC: 16.7 G/DL (ref 13–17.7)
HGB UR QL STRIP.AUTO: NEGATIVE
HYALINE CASTS UR QL AUTO: ABNORMAL /LPF
IMM GRANULOCYTES # BLD AUTO: 0.06 10*3/MM3 (ref 0–0.05)
IMM GRANULOCYTES NFR BLD AUTO: 0.4 % (ref 0–0.5)
KETONES UR QL STRIP: ABNORMAL
LEUKOCYTE ESTERASE UR QL STRIP.AUTO: ABNORMAL
LYMPHOCYTES # BLD AUTO: 1.93 10*3/MM3 (ref 0.7–3.1)
LYMPHOCYTES NFR BLD AUTO: 13 % (ref 19.6–45.3)
MCH RBC QN AUTO: 26.2 PG (ref 26.6–33)
MCHC RBC AUTO-ENTMCNC: 33.2 G/DL (ref 31.5–35.7)
MCV RBC AUTO: 78.8 FL (ref 79–97)
MONOCYTES # BLD AUTO: 0.59 10*3/MM3 (ref 0.1–0.9)
MONOCYTES NFR BLD AUTO: 4 % (ref 5–12)
NEUTROPHILS NFR BLD AUTO: 12.19 10*3/MM3 (ref 1.7–7)
NEUTROPHILS NFR BLD AUTO: 82.3 % (ref 42.7–76)
NITRITE UR QL STRIP: NEGATIVE
NRBC BLD AUTO-RTO: 0 /100 WBC (ref 0–0.2)
PH UR STRIP.AUTO: 7 [PH] (ref 5–8)
PLATELET # BLD AUTO: 255 10*3/MM3 (ref 140–450)
PMV BLD AUTO: 10.8 FL (ref 6–12)
POTASSIUM SERPL-SCNC: 4.1 MMOL/L (ref 3.5–5.2)
PROCALCITONIN SERPL-MCNC: 0.15 NG/ML (ref 0–0.25)
PROT SERPL-MCNC: 6.8 G/DL (ref 6–8.5)
PROT UR QL STRIP: ABNORMAL
RBC # BLD AUTO: 6.38 10*6/MM3 (ref 4.14–5.8)
RBC # UR STRIP: ABNORMAL /HPF
REF LAB TEST METHOD: ABNORMAL
S PYO AG THROAT QL: NEGATIVE
SODIUM SERPL-SCNC: 136 MMOL/L (ref 136–145)
SP GR UR STRIP: 1.01 (ref 1–1.03)
SQUAMOUS #/AREA URNS HPF: ABNORMAL /HPF
UROBILINOGEN UR QL STRIP: ABNORMAL
WBC # UR STRIP: ABNORMAL /HPF
WBC NRBC COR # BLD: 14.82 10*3/MM3 (ref 3.4–10.8)

## 2023-11-15 PROCEDURE — 76705 ECHO EXAM OF ABDOMEN: CPT

## 2023-11-15 PROCEDURE — 99284 EMERGENCY DEPT VISIT MOD MDM: CPT

## 2023-11-15 PROCEDURE — 84145 PROCALCITONIN (PCT): CPT | Performed by: EMERGENCY MEDICINE

## 2023-11-15 PROCEDURE — 96376 TX/PRO/DX INJ SAME DRUG ADON: CPT

## 2023-11-15 PROCEDURE — 80053 COMPREHEN METABOLIC PANEL: CPT | Performed by: EMERGENCY MEDICINE

## 2023-11-15 PROCEDURE — 36415 COLL VENOUS BLD VENIPUNCTURE: CPT

## 2023-11-15 PROCEDURE — 83605 ASSAY OF LACTIC ACID: CPT | Performed by: EMERGENCY MEDICINE

## 2023-11-15 PROCEDURE — 85025 COMPLETE CBC W/AUTO DIFF WBC: CPT | Performed by: EMERGENCY MEDICINE

## 2023-11-15 PROCEDURE — 96361 HYDRATE IV INFUSION ADD-ON: CPT

## 2023-11-15 PROCEDURE — 96375 TX/PRO/DX INJ NEW DRUG ADDON: CPT

## 2023-11-15 PROCEDURE — 25810000003 SODIUM CHLORIDE 0.9 % SOLUTION: Performed by: EMERGENCY MEDICINE

## 2023-11-15 PROCEDURE — 81001 URINALYSIS AUTO W/SCOPE: CPT | Performed by: EMERGENCY MEDICINE

## 2023-11-15 PROCEDURE — 25010000002 DROPERIDOL PER 5 MG: Performed by: EMERGENCY MEDICINE

## 2023-11-15 PROCEDURE — 87081 CULTURE SCREEN ONLY: CPT | Performed by: EMERGENCY MEDICINE

## 2023-11-15 PROCEDURE — 71045 X-RAY EXAM CHEST 1 VIEW: CPT

## 2023-11-15 PROCEDURE — 25010000002 DIPHENHYDRAMINE PER 50 MG: Performed by: INTERNAL MEDICINE

## 2023-11-15 PROCEDURE — 82948 REAGENT STRIP/BLOOD GLUCOSE: CPT

## 2023-11-15 PROCEDURE — G0378 HOSPITAL OBSERVATION PER HR: HCPCS

## 2023-11-15 PROCEDURE — 25010000002 LABETALOL 5 MG/ML SOLUTION: Performed by: EMERGENCY MEDICINE

## 2023-11-15 PROCEDURE — 63710000001 INSULIN GLARGINE PER 5 UNITS: Performed by: INTERNAL MEDICINE

## 2023-11-15 PROCEDURE — 63710000001 INSULIN LISPRO (HUMAN) PER 5 UNITS: Performed by: INTERNAL MEDICINE

## 2023-11-15 PROCEDURE — 96365 THER/PROPH/DIAG IV INF INIT: CPT

## 2023-11-15 PROCEDURE — 25010000002 DIPHENHYDRAMINE PER 50 MG: Performed by: EMERGENCY MEDICINE

## 2023-11-15 PROCEDURE — 25010000002 CEFTRIAXONE PER 250 MG: Performed by: EMERGENCY MEDICINE

## 2023-11-15 PROCEDURE — 25810000003 SODIUM CHLORIDE 0.9 % SOLUTION: Performed by: INTERNAL MEDICINE

## 2023-11-15 PROCEDURE — 87040 BLOOD CULTURE FOR BACTERIA: CPT | Performed by: EMERGENCY MEDICINE

## 2023-11-15 PROCEDURE — 86308 HETEROPHILE ANTIBODY SCREEN: CPT | Performed by: EMERGENCY MEDICINE

## 2023-11-15 PROCEDURE — 25010000002 METHYLPREDNISOLONE PER 40 MG: Performed by: INTERNAL MEDICINE

## 2023-11-15 PROCEDURE — 87880 STREP A ASSAY W/OPTIC: CPT | Performed by: EMERGENCY MEDICINE

## 2023-11-15 RX ORDER — PROCHLORPERAZINE EDISYLATE 5 MG/ML
10 INJECTION INTRAMUSCULAR; INTRAVENOUS EVERY 6 HOURS PRN
Status: DISCONTINUED | OUTPATIENT
Start: 2023-11-15 | End: 2023-11-17 | Stop reason: HOSPADM

## 2023-11-15 RX ORDER — ONDANSETRON 4 MG/1
4 TABLET, FILM COATED ORAL EVERY 6 HOURS PRN
Status: DISCONTINUED | OUTPATIENT
Start: 2023-11-15 | End: 2023-11-15

## 2023-11-15 RX ORDER — DROPERIDOL 2.5 MG/ML
1.25 INJECTION, SOLUTION INTRAMUSCULAR; INTRAVENOUS ONCE
Status: COMPLETED | OUTPATIENT
Start: 2023-11-15 | End: 2023-11-15

## 2023-11-15 RX ORDER — ONDANSETRON 2 MG/ML
4 INJECTION INTRAMUSCULAR; INTRAVENOUS EVERY 6 HOURS PRN
Status: DISCONTINUED | OUTPATIENT
Start: 2023-11-15 | End: 2023-11-15

## 2023-11-15 RX ORDER — DIPHENHYDRAMINE HYDROCHLORIDE 50 MG/ML
25 INJECTION INTRAMUSCULAR; INTRAVENOUS EVERY 6 HOURS PRN
Status: DISCONTINUED | OUTPATIENT
Start: 2023-11-15 | End: 2023-11-16

## 2023-11-15 RX ORDER — LABETALOL HYDROCHLORIDE 5 MG/ML
10 INJECTION, SOLUTION INTRAVENOUS ONCE
Status: COMPLETED | OUTPATIENT
Start: 2023-11-15 | End: 2023-11-15

## 2023-11-15 RX ORDER — FAMOTIDINE 10 MG/ML
20 INJECTION, SOLUTION INTRAVENOUS EVERY 12 HOURS SCHEDULED
Status: DISCONTINUED | OUTPATIENT
Start: 2023-11-15 | End: 2023-11-17 | Stop reason: HOSPADM

## 2023-11-15 RX ORDER — UREA 10 %
3 LOTION (ML) TOPICAL NIGHTLY PRN
Status: DISCONTINUED | OUTPATIENT
Start: 2023-11-15 | End: 2023-11-17 | Stop reason: HOSPADM

## 2023-11-15 RX ORDER — ACETAMINOPHEN 325 MG/1
650 TABLET ORAL EVERY 4 HOURS PRN
Status: DISCONTINUED | OUTPATIENT
Start: 2023-11-15 | End: 2023-11-17 | Stop reason: HOSPADM

## 2023-11-15 RX ORDER — INSULIN LISPRO 100 [IU]/ML
2-9 INJECTION, SOLUTION INTRAVENOUS; SUBCUTANEOUS
Status: DISCONTINUED | OUTPATIENT
Start: 2023-11-15 | End: 2023-11-17 | Stop reason: HOSPADM

## 2023-11-15 RX ORDER — AMOXICILLIN 250 MG
2 CAPSULE ORAL 2 TIMES DAILY
Status: DISCONTINUED | OUTPATIENT
Start: 2023-11-15 | End: 2023-11-17 | Stop reason: HOSPADM

## 2023-11-15 RX ORDER — ALBUTEROL SULFATE 2.5 MG/3ML
2.5 SOLUTION RESPIRATORY (INHALATION) EVERY 6 HOURS PRN
Status: DISCONTINUED | OUTPATIENT
Start: 2023-11-15 | End: 2023-11-17 | Stop reason: HOSPADM

## 2023-11-15 RX ORDER — NICOTINE POLACRILEX 4 MG
15 LOZENGE BUCCAL
Status: DISCONTINUED | OUTPATIENT
Start: 2023-11-15 | End: 2023-11-17 | Stop reason: HOSPADM

## 2023-11-15 RX ORDER — IBUPROFEN 600 MG/1
1 TABLET ORAL
Status: DISCONTINUED | OUTPATIENT
Start: 2023-11-15 | End: 2023-11-17 | Stop reason: HOSPADM

## 2023-11-15 RX ORDER — ACETAMINOPHEN 500 MG
1000 TABLET ORAL ONCE
Status: COMPLETED | OUTPATIENT
Start: 2023-11-15 | End: 2023-11-15

## 2023-11-15 RX ORDER — ROSUVASTATIN CALCIUM 40 MG/1
40 TABLET, COATED ORAL NIGHTLY
Status: DISCONTINUED | OUTPATIENT
Start: 2023-11-15 | End: 2023-11-17 | Stop reason: HOSPADM

## 2023-11-15 RX ORDER — DIPHENHYDRAMINE HYDROCHLORIDE 50 MG/ML
25 INJECTION INTRAMUSCULAR; INTRAVENOUS ONCE
Status: COMPLETED | OUTPATIENT
Start: 2023-11-15 | End: 2023-11-15

## 2023-11-15 RX ORDER — METHYLPREDNISOLONE SODIUM SUCCINATE 40 MG/ML
40 INJECTION, POWDER, LYOPHILIZED, FOR SOLUTION INTRAMUSCULAR; INTRAVENOUS EVERY 8 HOURS
Status: COMPLETED | OUTPATIENT
Start: 2023-11-15 | End: 2023-11-16

## 2023-11-15 RX ORDER — SODIUM CHLORIDE 9 MG/ML
125 INJECTION, SOLUTION INTRAVENOUS CONTINUOUS
Status: DISCONTINUED | OUTPATIENT
Start: 2023-11-15 | End: 2023-11-16

## 2023-11-15 RX ORDER — FLUOROMETHOLONE 0.1 %
1 SUSPENSION, DROPS(FINAL DOSAGE FORM)(ML) OPHTHALMIC (EYE) 3 TIMES DAILY
Status: DISCONTINUED | OUTPATIENT
Start: 2023-11-15 | End: 2023-11-17 | Stop reason: HOSPADM

## 2023-11-15 RX ORDER — DEXTROSE MONOHYDRATE 25 G/50ML
25 INJECTION, SOLUTION INTRAVENOUS
Status: DISCONTINUED | OUTPATIENT
Start: 2023-11-15 | End: 2023-11-17 | Stop reason: HOSPADM

## 2023-11-15 RX ORDER — POLYETHYLENE GLYCOL 3350 17 G/17G
17 POWDER, FOR SOLUTION ORAL DAILY PRN
Status: DISCONTINUED | OUTPATIENT
Start: 2023-11-15 | End: 2023-11-17 | Stop reason: HOSPADM

## 2023-11-15 RX ORDER — BISACODYL 5 MG/1
5 TABLET, DELAYED RELEASE ORAL DAILY PRN
Status: DISCONTINUED | OUTPATIENT
Start: 2023-11-15 | End: 2023-11-17 | Stop reason: HOSPADM

## 2023-11-15 RX ORDER — BISACODYL 10 MG
10 SUPPOSITORY, RECTAL RECTAL DAILY PRN
Status: DISCONTINUED | OUTPATIENT
Start: 2023-11-15 | End: 2023-11-17 | Stop reason: HOSPADM

## 2023-11-15 RX ADMIN — DIPHENHYDRAMINE HYDROCHLORIDE 25 MG: 50 INJECTION, SOLUTION INTRAMUSCULAR; INTRAVENOUS at 13:53

## 2023-11-15 RX ADMIN — LABETALOL HYDROCHLORIDE 10 MG: 5 INJECTION, SOLUTION INTRAVENOUS at 15:49

## 2023-11-15 RX ADMIN — ACETAMINOPHEN 1000 MG: 500 TABLET ORAL at 13:54

## 2023-11-15 RX ADMIN — INSULIN GLARGINE 11 UNITS: 100 INJECTION, SOLUTION SUBCUTANEOUS at 22:37

## 2023-11-15 RX ADMIN — SODIUM CHLORIDE 125 ML/HR: 9 INJECTION, SOLUTION INTRAVENOUS at 22:28

## 2023-11-15 RX ADMIN — CEFTRIAXONE 2000 MG: 2 INJECTION, POWDER, FOR SOLUTION INTRAMUSCULAR; INTRAVENOUS at 15:46

## 2023-11-15 RX ADMIN — DROPERIDOL 1.25 MG: 2.5 INJECTION, SOLUTION INTRAMUSCULAR; INTRAVENOUS at 13:53

## 2023-11-15 RX ADMIN — INSULIN LISPRO 2 UNITS: 100 INJECTION, SOLUTION INTRAVENOUS; SUBCUTANEOUS at 22:37

## 2023-11-15 RX ADMIN — ACETAMINOPHEN 650 MG: 325 TABLET, FILM COATED ORAL at 20:12

## 2023-11-15 RX ADMIN — FAMOTIDINE 20 MG: 10 INJECTION INTRAVENOUS at 22:28

## 2023-11-15 RX ADMIN — DIPHENHYDRAMINE HYDROCHLORIDE 25 MG: 50 INJECTION, SOLUTION INTRAMUSCULAR; INTRAVENOUS at 20:22

## 2023-11-15 RX ADMIN — SODIUM CHLORIDE 1000 ML: 9 INJECTION, SOLUTION INTRAVENOUS at 13:51

## 2023-11-15 NOTE — ED NOTES
Pt states that he was here yesterday for esophagus pain. Pt was given zofran and feels like he might being having allergic reaction.

## 2023-11-15 NOTE — ED NOTES
Nursing report ED to floor  Josiah Loomis  38 y.o.  male    HPI :   Chief Complaint   Patient presents with    Allergic Reaction       Admitting doctor:   Margaux Garcia MD    Admitting diagnosis:   The primary encounter diagnosis was Fever, unspecified fever cause. Diagnoses of Leukocytosis, unspecified type, Tachycardia, Hyperglycemia, and Allergic reaction, initial encounter were also pertinent to this visit.    Code status:   Current Code Status       Date Active Code Status Order ID Comments User Context       Not on file            Allergies:   Patient has no known allergies.    Isolation:   No active isolations    Intake and Output    Intake/Output Summary (Last 24 hours) at 11/15/2023 1610  Last data filed at 11/15/2023 1514  Gross per 24 hour   Intake 1000 ml   Output --   Net 1000 ml       Weight:       11/15/23  1315   Weight: (!) 160 kg (352 lb)       Most recent vitals:   Vitals:    11/15/23 1521 11/15/23 1551 11/15/23 1553 11/15/23 1601   BP: (!) 174/117 137/92  132/88   Pulse:  114  99   Resp:       Temp:   100.1 °F (37.8 °C)    TempSrc:   Tympanic    SpO2: 97% 94%  95%   Weight:       Height:           Active LDAs/IV Access:   Lines, Drains & Airways       Active LDAs       Name Placement date Placement time Site Days    Peripheral IV 11/15/23 1330 Left Antecubital 11/15/23  1330  Antecubital  less than 1                    Labs (abnormal labs have a star):   Labs Reviewed   COMPREHENSIVE METABOLIC PANEL - Abnormal; Notable for the following components:       Result Value    Glucose 135 (*)     BUN 5 (*)     CO2 21.0 (*)     ALT (SGPT) 64 (*)     AST (SGOT) 45 (*)     Alkaline Phosphatase 172 (*)     Total Bilirubin 1.3 (*)     BUN/Creatinine Ratio 5.0 (*)     All other components within normal limits    Narrative:     GFR Normal >60  Chronic Kidney Disease <60  Kidney Failure <15     CBC WITH AUTO DIFFERENTIAL - Abnormal; Notable for the following components:    WBC 14.82 (*)     RBC 6.38 (*)  "    MCV 78.8 (*)     MCH 26.2 (*)     Neutrophil % 82.3 (*)     Lymphocyte % 13.0 (*)     Monocyte % 4.0 (*)     Eosinophil % 0.2 (*)     Neutrophils, Absolute 12.19 (*)     Immature Grans, Absolute 0.06 (*)     All other components within normal limits   RAPID STREP A SCREEN - Normal   PROCALCITONIN - Normal    Narrative:     As a Marker for Sepsis (Non-Neonates):    1. <0.5 ng/mL represents a low risk of severe sepsis and/or septic shock.  2. >2 ng/mL represents a high risk of severe sepsis and/or septic shock.    As a Marker for Lower Respiratory Tract Infections that require antibiotic therapy:    PCT on Admission    Antibiotic Therapy       6-12 Hrs later    >0.5                Strongly Recommended  >0.25 - <0.5        Recommended   0.1 - 0.25          Discouraged              Remeasure/reassess PCT  <0.1                Strongly Discouraged     Remeasure/reassess PCT    As 28 day mortality risk marker: \"Change in Procalcitonin Result\" (>80% or <=80%) if Day 0 (or Day 1) and Day 4 values are available. Refer to http://www.[employer]-pct-calculator.com    Change in PCT <=80%  A decrease of PCT levels below or equal to 80% defines a positive change in PCT test result representing a higher risk for 28-day all-cause mortality of patients diagnosed with severe sepsis for septic shock.    Change in PCT >80%  A decrease of PCT levels of more than 80% defines a negative change in PCT result representing a lower risk for 28-day all-cause mortality of patients diagnosed with severe sepsis or septic shock.      LACTIC ACID, PLASMA - Normal   MONONUCLEOSIS SCREEN - Normal   BLOOD CULTURE   BLOOD CULTURE   BETA HEMOLYTIC STREP CULTURE, THROAT   URINALYSIS W/ MICROSCOPIC IF INDICATED (NO CULTURE)   URINALYSIS, MICROSCOPIC ONLY   CBC AND DIFFERENTIAL    Narrative:     The following orders were created for panel order CBC & Differential.  Procedure                               Abnormality         Status                   "   ---------                               -----------         ------                     CBC Auto Differential[[id number]]        Abnormal            Final result                 Please view results for these tests on the individual orders.       EKG:   No orders to display       Meds given in ED:   Medications   cefTRIAXone (ROCEPHIN) 2,000 mg in sodium chloride 0.9 % 100 mL IVPB-VTB (2,000 mg Intravenous New Bag 11/15/23 1546)   acetaminophen (TYLENOL) tablet 1,000 mg (1,000 mg Oral Given 11/15/23 1354)   droperidol (INAPSINE) injection 1.25 mg (1.25 mg Intravenous Given 11/15/23 1353)   diphenhydrAMINE (BENADRYL) injection 25 mg (25 mg Intravenous Given 11/15/23 1353)   sodium chloride 0.9 % bolus 1,000 mL (0 mL Intravenous Stopped 11/15/23 1514)   labetalol (NORMODYNE,TRANDATE) injection 10 mg (10 mg Intravenous Given 11/15/23 1549)       Imaging results:  XR Chest 1 View    Result Date: 11/15/2023  Negative.  This report was finalized on 11/15/2023 2:16 PM by Dr. Amadeo Hilton M.D on Workstation: [id number]      XR Chest 1 View    Result Date: 11/14/2023  No focal consolidation. No pleural effusion or pneumothorax.  Normal size cardiomediastinal silhouette.  No focal osseous abnormality.    This report was finalized on 11/14/2023 8:22 AM by Dr. Al Dougherty M.D on Workstation: [id number]       Ambulatory status:   - assist    Social issues:   Social History     Socioeconomic History    Marital status:    Tobacco Use    Smoking status: Never    Smokeless tobacco: Never   Substance and Sexual Activity    Alcohol use: Yes     Comment: rarely    Drug use: No    Sexual activity: Defer       NIH Stroke Scale:       Jeffrey Oliveira RN  11/15/23 16:10 EST

## 2023-11-15 NOTE — ED PROVIDER NOTES
" EMERGENCY DEPARTMENT ENCOUNTER  Room Number:  38/38  Date of encounter:  11/15/2023  PCP: Kodi Rosario DO  Patient Care Team:  Kodi Rosario DO as PCP - General  Kodi Rosario DO as PCP - Family Medicine  Claude Gamboa MD (Ophthalmology)  System, Provider Not In (Gastroenterology)     HPI:  Context: Josiah Loomis is a 38 y.o. male who presents to the ED c/o chief complaint of \"I think I am having allergic reaction.\"  Patient reports he was seen here yesterday for throat pain with difficulty swallowing difficulty breathing, reports after he left yesterday he was able to eat something and his throat symptoms went away.  Patient reports that when he got home he began having some swelling of his bottom lip, began having pruritic rash mainly on his abdomen and extremities.  Patient denies any tongue swelling, reports difficulty swallowing difficulty breathing has resolved, reports he was having nausea vomiting before he was seen yesterday, no emesis since, did have some nausea this morning.  Patient denies any diarrhea.  Patient was febrile here, reports subjective fevers at home, denies any runny nose congestion or cough, does report that he is having dysuria, no hematuria, no increasing urinary frequency or urgency.  Patient believes the allergic reaction is secondary to the Zofran, reports he began having symptoms after he was given it, denies any history of allergic reactions to medications or foods in the past, no history of anaphylaxis.    MEDICAL HISTORY REVIEW  Reviewed in EPIC    PAST MEDICAL HISTORY  Active Ambulatory Problems     Diagnosis Date Noted    Sprain of acromioclavicular ligament 05/26/2016    Atopic dermatitis 05/26/2016    Dyslipidemia 05/26/2016    Abdominal hernia 05/26/2016    Morbidly obese 11/16/2018    Type 2 diabetes mellitus without complication, with long-term current use of insulin 08/22/2019    Hyperlipidemia 08/20/2020    Abnormal liver function tests 03/11/2021    " Autoimmune hepatitis 04/01/2022    Cataract extraction status of left eye 12/03/2021    Cataract of right eye 04/01/2022    Cystoid macular edema 03/08/2021    Diabetes mellitus 12/03/2020    Disorder due to insertion of glaucoma drainage device 10/08/2021    Gastroesophageal reflux disease 03/11/2021    High gamma glutamyl transferase (GGT) 12/03/2020    Other specified postprocedural states 03/11/2021    Other states following surgery of eye and adnexa 09/22/2021    Pseudophakia of left eye 03/05/2021    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 No pertinent surgical history.    FAMILY HISTORY  Family History   Problem Relation Age of Onset    Crohn's disease Father     Diabetes Maternal Grandmother     Diabetes Paternal Grandmother        SOCIAL HISTORY  Social History     Socioeconomic History    Marital status:    Tobacco Use    Smoking status: Never    Smokeless tobacco: Never   Substance and Sexual Activity    Alcohol use: Yes     Comment: rarely    Drug use: No    Sexual activity: Defer       ALLERGIES  Patient has no known allergies.    The patient's allergies have been reviewed    REVIEW OF SYSTEMS  All systems reviewed and negative except for those discussed in HPI.     PHYSICAL EXAM  I have reviewed the triage vital signs and nursing notes.  ED Triage Vitals   Temp Heart Rate Resp BP SpO2   11/15/23 1309 11/15/23 1309 11/15/23 1309 11/15/23 1315 11/15/23 1309   (!) 101.5 °F (38.6 °C) (!) 127 18 (!) 154/119 98 %      Temp src Heart Rate Source Patient Position BP Location FiO2 (%)   -- 11/15/23 1309 -- -- --    Monitor          General: No  acute distress.  HENT: NCAT, PERRL, Nares patent.  No appreciable swelling of lips tongue or throat, oropharynx is normal in appearance, uvula midline, no uvula crowding, voice is normal, no difficulty handling secretions  Eyes: no scleral icterus.  Neck: trachea midline, no ROM limitations.  No stridor  CV: regular rhythm, regular rate.  Respiratory: normal effort, CTAB.  Abdomen: soft, nondistended, NTTP, no rebound tenderness, no guarding or rigidity.  Musculoskeletal: no deformity.  Neuro: alert, moves all extremities, follows commands.  Skin: warm, dry.  Hives, most prominent on the abdomen    LAB RESULTS  Recent Results (from the past 24 hour(s))   Comprehensive Metabolic Panel    Collection Time: 11/15/23  1:49 PM    Specimen: Arm, Left; Blood   Result Value Ref Range    Glucose 135 (H) 65 - 99 mg/dL    BUN 5 (L) 6 - 20 mg/dL    Creatinine 1.01 0.76 - 1.27 mg/dL    Sodium 136 136 - 145 mmol/L    Potassium 4.1 3.5 - 5.2 mmol/L    Chloride 103 98 - 107 mmol/L    CO2 21.0 (L) 22.0 - 29.0 mmol/L    Calcium 9.2 8.6 - 10.5 mg/dL    Total Protein 6.8 6.0 - 8.5 g/dL    Albumin 3.8 3.5 - 5.2 g/dL    ALT (SGPT) 64 (H) 1 - 41 U/L    AST (SGOT) 45 (H) 1 - 40 U/L    Alkaline Phosphatase 172 (H) 39 - 117 U/L    Total Bilirubin 1.3 (H) 0.0 - 1.2 mg/dL    Globulin 3.0 gm/dL    A/G Ratio 1.3 g/dL    BUN/Creatinine Ratio 5.0 (L) 7.0 - 25.0    Anion Gap 12.0 5.0 - 15.0 mmol/L    eGFR 98.8 >60.0 mL/min/1.73   Procalcitonin    Collection Time: 11/15/23  1:49 PM    Specimen: Arm, Left; Blood   Result Value Ref Range    Procalcitonin 0.15 0.00 - 0.25 ng/mL   Lactic Acid, Plasma    Collection Time: 11/15/23  1:49 PM    Specimen: Arm, Left; Blood   Result Value Ref Range    Lactate 0.9 0.5 - 2.0 mmol/L   CBC Auto Differential    Collection Time: 11/15/23  1:49 PM    Specimen: Arm, Left; Blood   Result Value Ref Range    WBC 14.82 (H) 3.40 - 10.80 10*3/mm3    RBC 6.38 (H) 4.14 - 5.80 10*6/mm3    Hemoglobin 16.7 13.0 - 17.7 g/dL     Hematocrit 50.3 37.5 - 51.0 %    MCV 78.8 (L) 79.0 - 97.0 fL    MCH 26.2 (L) 26.6 - 33.0 pg    MCHC 33.2 31.5 - 35.7 g/dL    RDW 13.4 12.3 - 15.4 %    RDW-SD 38.6 37.0 - 54.0 fl    MPV 10.8 6.0 - 12.0 fL    Platelets 255 140 - 450 10*3/mm3    Neutrophil % 82.3 (H) 42.7 - 76.0 %    Lymphocyte % 13.0 (L) 19.6 - 45.3 %    Monocyte % 4.0 (L) 5.0 - 12.0 %    Eosinophil % 0.2 (L) 0.3 - 6.2 %    Basophil % 0.1 0.0 - 1.5 %    Immature Grans % 0.4 0.0 - 0.5 %    Neutrophils, Absolute 12.19 (H) 1.70 - 7.00 10*3/mm3    Lymphocytes, Absolute 1.93 0.70 - 3.10 10*3/mm3    Monocytes, Absolute 0.59 0.10 - 0.90 10*3/mm3    Eosinophils, Absolute 0.03 0.00 - 0.40 10*3/mm3    Basophils, Absolute 0.02 0.00 - 0.20 10*3/mm3    Immature Grans, Absolute 0.06 (H) 0.00 - 0.05 10*3/mm3    nRBC 0.0 0.0 - 0.2 /100 WBC   Mononucleosis Screen    Collection Time: 11/15/23  2:13 PM    Specimen: Blood   Result Value Ref Range    Monospot Negative Negative   Rapid Strep A Screen - Swab, Throat    Collection Time: 11/15/23  2:16 PM    Specimen: Throat; Swab   Result Value Ref Range    Strep A Ag Negative Negative       I ordered the above labs and reviewed the results.    RADIOLOGY  XR Chest 1 View    Result Date: 11/15/2023  PORTABLE CHEST X-RAY  HISTORY: Fever.  Portable chest x-ray is provided. Correlation: November 14, 2023.  FINDINGS: The cardiomediastinal silhouette is normal. The lungs are clear. The costophrenic sulci are dry and the bones appear normal. There is no pneumothorax.      Negative.  This report was finalized on 11/15/2023 2:16 PM by Dr. Amadeo Hilton M.D on Workstation: [id number]       I ordered the above noted radiological studies. I reviewed the images and results. I agree with the radiologist interpretation.    PROCEDURES  Procedures    MEDICATIONS GIVEN IN ER  Medications   cefTRIAXone (ROCEPHIN) 2,000 mg in sodium chloride 0.9 % 100 mL IVPB-VTB (2,000 mg Intravenous New Bag 11/15/23 6472)   acetaminophen (TYLENOL) tablet  1,000 mg (1,000 mg Oral Given 11/15/23 1354)   droperidol (INAPSINE) injection 1.25 mg (1.25 mg Intravenous Given 11/15/23 1353)   diphenhydrAMINE (BENADRYL) injection 25 mg (25 mg Intravenous Given 11/15/23 1353)   sodium chloride 0.9 % bolus 1,000 mL (0 mL Intravenous Stopped 11/15/23 1514)   labetalol (NORMODYNE,TRANDATE) injection 10 mg (10 mg Intravenous Given 11/15/23 1549)       PROGRESS, DATA ANALYSIS, CONSULTS, AND MEDICAL DECISION MAKING  A complete history and physical exam have been performed.  All available laboratory and imaging results have been reviewed by myself prior to disposition.    MDM    After the initial H&P, I discussed pertinent information from history and physical exam with patient/family.  Discussed differential diagnosis.  Discussed plan for ED evaluation/workup/treatment.  All questions answered.  Patient/family is agreeable with plan.  ED Course as of 11/15/23 1602   Wed Nov 15, 2023   1454 I reviewed chest x-ray in PACS, no pulmonary infiltrates per my read. [JG]   1525 Patient denies any abdominal pain but does have fever with elevated liver enzymes and elevated bilirubin.  Will obtain right upper quadrant ultrasound to evaluate. [JG]   1535 Patient febrile, tachycardic, leukocytosis with left shift, no identified infectious source although urinalysis currently pending.  This could be a drug fever given patient is also having allergic reaction but I am concerned for acute infection.  Giving ceftriaxone, plan for admission for further evaluation and treatment. [JG]   1539 Patient reassessed.  Discussed ED findings, differential diagnosis, and the need for admission for evaluation/treatment.  They are agreeable to admission and all questions were answered.     [JG]   1555 Patient had throat pain and difficulty swallowing with difficulty breathing yesterday, was seen in the emergency department, symptoms resolved after eating after discharge but patient then began to have allergic  symptoms which she attributed to the Zofran given to him in the emergency department.  Patient was having subjective fevers at home, febrile here, tachycardic, leukocytosis with left shift.  Patient meeting markers for sepsis, no source found, urinalysis and right upper quadrant ultrasound currently pending.  Covered with ceftriaxone, blood cultures and lactic acid obtained, lactic acid normal.  Patient currently pending hospitalist callback for admission. [JG]   1602 Phone call with Dr. Garcia [facility].  Discussed the patient, relevant history, exam, diagnostics, ED findings/progress, and concerns. They agree to admit the patient to telemetry observation. Care assumed by the admitting physician at this time.     [JG]      ED Course User Index  [JG] Ortiz Costa MD       AS OF 16:02 EST VITALS:    BP - 137/92  HR - 114  TEMP - 100.1 °F (37.8 °C) (Tympanic)  O2 SATS - 94%    DIAGNOSIS  Final diagnoses:   Fever, unspecified fever cause   Leukocytosis, unspecified type   Tachycardia   Hyperglycemia   Allergic reaction, initial encounter         DISPOSITION  ADMISSION    Discussed treatment plan and reason for admission with pt/family and admitting physician.  Pt/family voiced understanding of the plan for admission for further testing/treatment as needed.        Ortiz Costa MD  11/15/23 [id number]

## 2023-11-16 PROBLEM — T78.40XA ALLERGIC REACTION CAUSED BY A DRUG: Status: ACTIVE | Noted: 2023-11-16

## 2023-11-16 LAB
ANION GAP SERPL CALCULATED.3IONS-SCNC: 11.3 MMOL/L (ref 5–15)
B PARAPERT DNA SPEC QL NAA+PROBE: NOT DETECTED
B PERT DNA SPEC QL NAA+PROBE: NOT DETECTED
BUN SERPL-MCNC: 6 MG/DL (ref 6–20)
BUN/CREAT SERPL: 6.3 (ref 7–25)
C PNEUM DNA NPH QL NAA+NON-PROBE: NOT DETECTED
CALCIUM SPEC-SCNC: 8.7 MG/DL (ref 8.6–10.5)
CHLORIDE SERPL-SCNC: 103 MMOL/L (ref 98–107)
CO2 SERPL-SCNC: 21.7 MMOL/L (ref 22–29)
CREAT SERPL-MCNC: 0.95 MG/DL (ref 0.76–1.27)
DEPRECATED RDW RBC AUTO: 39.8 FL (ref 37–54)
EGFRCR SERPLBLD CKD-EPI 2021: 106.4 ML/MIN/1.73
ERYTHROCYTE [DISTWIDTH] IN BLOOD BY AUTOMATED COUNT: 13.7 % (ref 12.3–15.4)
FLUAV SUBTYP SPEC NAA+PROBE: NOT DETECTED
FLUBV RNA ISLT QL NAA+PROBE: NOT DETECTED
GLUCOSE BLDC GLUCOMTR-MCNC: 162 MG/DL (ref 70–130)
GLUCOSE BLDC GLUCOMTR-MCNC: 187 MG/DL (ref 70–130)
GLUCOSE BLDC GLUCOMTR-MCNC: 197 MG/DL (ref 70–130)
GLUCOSE BLDC GLUCOMTR-MCNC: 229 MG/DL (ref 70–130)
GLUCOSE SERPL-MCNC: 181 MG/DL (ref 65–99)
HADV DNA SPEC NAA+PROBE: NOT DETECTED
HCOV 229E RNA SPEC QL NAA+PROBE: NOT DETECTED
HCOV HKU1 RNA SPEC QL NAA+PROBE: NOT DETECTED
HCOV NL63 RNA SPEC QL NAA+PROBE: NOT DETECTED
HCOV OC43 RNA SPEC QL NAA+PROBE: NOT DETECTED
HCT VFR BLD AUTO: 50 % (ref 37.5–51)
HGB BLD-MCNC: 16.4 G/DL (ref 13–17.7)
HMPV RNA NPH QL NAA+NON-PROBE: NOT DETECTED
HPIV1 RNA ISLT QL NAA+PROBE: NOT DETECTED
HPIV2 RNA SPEC QL NAA+PROBE: NOT DETECTED
HPIV3 RNA NPH QL NAA+PROBE: NOT DETECTED
HPIV4 P GENE NPH QL NAA+PROBE: NOT DETECTED
M PNEUMO IGG SER IA-ACNC: NOT DETECTED
MCH RBC QN AUTO: 26.1 PG (ref 26.6–33)
MCHC RBC AUTO-ENTMCNC: 32.8 G/DL (ref 31.5–35.7)
MCV RBC AUTO: 79.5 FL (ref 79–97)
PLATELET # BLD AUTO: 269 10*3/MM3 (ref 140–450)
PMV BLD AUTO: 10.3 FL (ref 6–12)
POTASSIUM SERPL-SCNC: 4.3 MMOL/L (ref 3.5–5.2)
RBC # BLD AUTO: 6.29 10*6/MM3 (ref 4.14–5.8)
RHINOVIRUS RNA SPEC NAA+PROBE: NOT DETECTED
RSV RNA NPH QL NAA+NON-PROBE: NOT DETECTED
SARS-COV-2 RNA NPH QL NAA+NON-PROBE: NOT DETECTED
SODIUM SERPL-SCNC: 136 MMOL/L (ref 136–145)
WBC NRBC COR # BLD: 16.22 10*3/MM3 (ref 3.4–10.8)

## 2023-11-16 PROCEDURE — 25010000002 METHYLPREDNISOLONE PER 40 MG: Performed by: INTERNAL MEDICINE

## 2023-11-16 PROCEDURE — 63710000001 INSULIN GLARGINE PER 5 UNITS: Performed by: INTERNAL MEDICINE

## 2023-11-16 PROCEDURE — 63710000001 INSULIN LISPRO (HUMAN) PER 5 UNITS: Performed by: INTERNAL MEDICINE

## 2023-11-16 PROCEDURE — 85027 COMPLETE CBC AUTOMATED: CPT | Performed by: INTERNAL MEDICINE

## 2023-11-16 PROCEDURE — 96375 TX/PRO/DX INJ NEW DRUG ADDON: CPT

## 2023-11-16 PROCEDURE — 82948 REAGENT STRIP/BLOOD GLUCOSE: CPT

## 2023-11-16 PROCEDURE — 25010000002 PROCHLORPERAZINE 10 MG/2ML SOLUTION: Performed by: INTERNAL MEDICINE

## 2023-11-16 PROCEDURE — 80048 BASIC METABOLIC PNL TOTAL CA: CPT | Performed by: INTERNAL MEDICINE

## 2023-11-16 PROCEDURE — 25010000002 METHYLPREDNISOLONE PER 40 MG: Performed by: HOSPITALIST

## 2023-11-16 PROCEDURE — 96361 HYDRATE IV INFUSION ADD-ON: CPT

## 2023-11-16 PROCEDURE — G0378 HOSPITAL OBSERVATION PER HR: HCPCS

## 2023-11-16 PROCEDURE — 25810000003 SODIUM CHLORIDE 0.9 % SOLUTION: Performed by: INTERNAL MEDICINE

## 2023-11-16 PROCEDURE — 96376 TX/PRO/DX INJ SAME DRUG ADON: CPT

## 2023-11-16 PROCEDURE — 0202U NFCT DS 22 TRGT SARS-COV-2: CPT | Performed by: HOSPITALIST

## 2023-11-16 RX ORDER — PREDNISOLONE ACETATE 10 MG/ML
1 SUSPENSION/ DROPS OPHTHALMIC DAILY
COMMUNITY

## 2023-11-16 RX ORDER — DIPHENHYDRAMINE HCL 50 MG
50 CAPSULE ORAL EVERY 6 HOURS PRN
Status: DISCONTINUED | OUTPATIENT
Start: 2023-11-16 | End: 2023-11-17 | Stop reason: HOSPADM

## 2023-11-16 RX ADMIN — ROSUVASTATIN CALCIUM 40 MG: 40 TABLET, FILM COATED ORAL at 02:15

## 2023-11-16 RX ADMIN — ROSUVASTATIN CALCIUM 40 MG: 40 TABLET, FILM COATED ORAL at 21:14

## 2023-11-16 RX ADMIN — FLUOROMETHOLONE 1 DROP: 1 SOLUTION/ DROPS OPHTHALMIC at 21:15

## 2023-11-16 RX ADMIN — FLUOROMETHOLONE 1 DROP: 1 SOLUTION/ DROPS OPHTHALMIC at 08:48

## 2023-11-16 RX ADMIN — DOCUSATE SODIUM 50MG AND SENNOSIDES 8.6MG 2 TABLET: 8.6; 5 TABLET, FILM COATED ORAL at 21:14

## 2023-11-16 RX ADMIN — INSULIN LISPRO 2 UNITS: 100 INJECTION, SOLUTION INTRAVENOUS; SUBCUTANEOUS at 17:19

## 2023-11-16 RX ADMIN — FAMOTIDINE 20 MG: 10 INJECTION INTRAVENOUS at 21:14

## 2023-11-16 RX ADMIN — INSULIN LISPRO 2 UNITS: 100 INJECTION, SOLUTION INTRAVENOUS; SUBCUTANEOUS at 08:47

## 2023-11-16 RX ADMIN — SODIUM CHLORIDE 125 ML/HR: 9 INJECTION, SOLUTION INTRAVENOUS at 10:42

## 2023-11-16 RX ADMIN — INSULIN LISPRO 2 UNITS: 100 INJECTION, SOLUTION INTRAVENOUS; SUBCUTANEOUS at 22:08

## 2023-11-16 RX ADMIN — INSULIN GLARGINE 11 UNITS: 100 INJECTION, SOLUTION SUBCUTANEOUS at 22:08

## 2023-11-16 RX ADMIN — DOCUSATE SODIUM 50MG AND SENNOSIDES 8.6MG 2 TABLET: 8.6; 5 TABLET, FILM COATED ORAL at 08:48

## 2023-11-16 RX ADMIN — METHYLPREDNISOLONE SODIUM SUCCINATE 40 MG: 40 INJECTION, POWDER, FOR SOLUTION INTRAMUSCULAR; INTRAVENOUS at 16:08

## 2023-11-16 RX ADMIN — INSULIN LISPRO 4 UNITS: 100 INJECTION, SOLUTION INTRAVENOUS; SUBCUTANEOUS at 12:02

## 2023-11-16 RX ADMIN — METHYLPREDNISOLONE SODIUM SUCCINATE 40 MG: 40 INJECTION, POWDER, FOR SOLUTION INTRAMUSCULAR; INTRAVENOUS at 21:14

## 2023-11-16 RX ADMIN — PROCHLORPERAZINE EDISYLATE 10 MG: 5 INJECTION INTRAMUSCULAR; INTRAVENOUS at 02:15

## 2023-11-16 RX ADMIN — METHYLPREDNISOLONE SODIUM SUCCINATE 40 MG: 40 INJECTION, POWDER, FOR SOLUTION INTRAMUSCULAR; INTRAVENOUS at 08:47

## 2023-11-16 RX ADMIN — METHYLPREDNISOLONE SODIUM SUCCINATE 40 MG: 40 INJECTION, POWDER, FOR SOLUTION INTRAMUSCULAR; INTRAVENOUS at 02:29

## 2023-11-16 RX ADMIN — FAMOTIDINE 20 MG: 10 INJECTION INTRAVENOUS at 08:47

## 2023-11-16 NOTE — H&P
HISTORY AND PHYSICAL   [facility]        Date of Admission: 11/15/2023  Patient Identification:  Name: Josiah Loomis  Age: 38 y.o.  Sex: male  :  1985  MRN: [id number]                     Primary Care Physician: Kodi Rosario DO    Chief Complaint:  38 year old gentleman who presented to the emergency room with fever, malaise ; he was seen last night with nausea, vomiting and throat pain; he felt better after he got home but then started having pain of his esophagus which he attributed to zofran; he was febrile to 101.5; he was seen again today and admitted; he has been itching and thinks he might have had an allergic reaction to the zofran    History of Present Illness:   As above    Past Medical History:  Past Medical History:   Diagnosis Date    GERD (gastroesophageal reflux disease)     Hyperlipidemia     Hypertension      Past Surgical History:  History reviewed. No pertinent surgical history.   Home Meds:  Medications Prior to Admission   Medication Sig Dispense Refill Last Dose    Adalimumab (Humira) 40 MG/0.4ML Prefilled Syringe Kit injection Inject 0.4 mL under the skin into the appropriate area as directed 1 (One) Time.   Past Month    Continuous Blood Gluc Sensor (FreeStyle Checo 3 Sensor) misc 1 each Every 14 (Fourteen) Days. 6 each 3 Past Week    famotidine (PEPCID) 40 MG tablet TAKE 1 TABLET BY MOUTH ONCE DAILY AT NIGHT 90 tablet 0 2023    fluorometholone (FML) 0.1 % ophthalmic suspension    2023    insulin aspart (NovoLOG FlexPen ReliOn) 100 UNIT/ML solution pen-injector sc pen INJECT UP TO 36 UNITS DAILY VIA SLIDING SCALE(3U)150-200, 6U(201-250), 9U(251-300), 12 USE(301-351), 33 mL 3 Past Month    insulin degludec (Tresiba FlexTouch) 100 UNIT/ML solution pen-injector injection Inject 11 Units under the skin into the appropriate area as directed Every Night. 15 mL 5 Past Week    ondansetron (ZOFRAN) 4 MG tablet Take 1-2 tablets by mouth.   2023     predniSONE (DELTASONE) 5 MG tablet TAKE 6 TABLETS BY MOUTH 1 TIME DAILY FOR 2 WEEKS. THEN TAKE 4 TABLETS BY MOUTH DAILY FOR 4 WEEKS.   Past Week    rosuvastatin (CRESTOR) 40 MG tablet TAKE 1 TABLET BY MOUTH ONCE DAILY AT NIGHT 90 tablet 0 Past Week    Semaglutide, 1 MG/DOSE, (OZEMPIC) 4 MG/3ML solution pen-injector Inject 1 mg under the skin into the appropriate area as directed 1 (One) Time Per Week. 3 mL 5 Past Week    azaTHIOprine (IMURAN) 50 MG tablet Take 1 tablet by mouth Daily.   More than a month    Cholecalciferol (vitamin D3) 125 MCG (5000 UT) tablet tablet Take 1 tablet by mouth Daily. 90 tablet 1 More than a month    glucose blood (Accu-Chek Guide) test strip 1 each by Other route 4 (Four) Times a Day. Use as instructed. Dx. E11.9 400 each 12     Insulin Lispro (ADMELOG SOLOSTAR) 100 UNIT/ML injection pen Inject up to 36 units daily under skin via sliding scale. (3u) 150-200, (6u) 201-250, (9u) 251-300, (12u) 301-350 33 mL 12 More than a month    Insulin Pen Needle (BD Pen Needle Dory 2nd Gen) 32G X 4 MM misc USE AS DIRECTED FOUR TIMES DAILY 400 each 5     Lancets 30G misc 1 Device 4 (Four) Times a Day. Dx. E11.9 400 each 12     TIMOLOL-DORZOLAMID-LATANOPROST OP Apply  to eye(s) as directed by provider.          Allergies:  Allergies   Allergen Reactions    Zofran [Ondansetron] Itching     IV form only     Immunizations:  Immunization History   Administered Date(s) Administered    Fluzone (or Fluarix & Flulaval for VFC) >6mos 01/10/2023    Pneumococcal Conjugate 20-Valent (PCV20) 01/10/2023    Shingrix 03/16/2023, 06/15/2023     Social History:   Social History     Social History Narrative    Not on file     Social History     Socioeconomic History    Marital status:    Tobacco Use    Smoking status: Never    Smokeless tobacco: Never   Vaping Use    Vaping Use: Never used   Substance and Sexual Activity    Alcohol use: Yes     Comment: rarely    Drug use: Yes     Frequency: 1.0 times per week      "Types: Marijuana     Comment: smokes daily per pt    Sexual activity: Defer       Family History:  Family History   Problem Relation Age of Onset    Crohn's disease Father     Diabetes Maternal Grandmother     Diabetes Paternal Grandmother         Review of Systems  See history of present illness and past medical history.  Patient denies headache, dizziness, syncope, falls, trauma, change in vision, change in hearing, change in taste, changes in weight, changes in appetite, focal weakness, numbness, or paresthesia.  Patient denies chest pain, palpitations, dyspnea, orthopnea, PND, cough, sinus pressure, rhinorrhea, epistaxis, hemoptysis hematemesis, diarrhea, constipation or hematochezia.  Denies cold or heat intolerance, polydipsia, polyuria, polyphagia. Denies hematuria, pyuria, dysuria, hesitancy, frequency or urgency. Denies consumption of raw and under cooked meats foods or change in water source.  Denies fever, chills, sweats, night sweats.       Objective:  T Max 24 hrs: Temp (24hrs), Av.1 °F (37.8 °C), Min:98.7 °F (37.1 °C), Max:101.5 °F (38.6 °C)    Vitals Ranges:   Temp:  [98.7 °F (37.1 °C)-101.5 °F (38.6 °C)] 100 °F (37.8 °C)  Heart Rate:  [] 107  Resp:  [18-40] 40  BP: (124-174)/() 136/92      Exam:  /92 (BP Location: Left arm, Patient Position: Lying)   Pulse 107   Temp 100 °F (37.8 °C) (Oral)   Resp (!) 40   Ht 182.9 cm (72\")   Wt (!) 160 kg (352 lb)   SpO2 97%   BMI 47.74 kg/m²     General Appearance:    Alert, cooperative, no distress, appears stated age   Head:    Normocephalic, without obvious abnormality, atraumatic   Eyes:    PERRL, conjunctivae/corneas clear, EOM's intact, both eyes   Ears:    Normal external ear canals, both ears   Nose:   Nares normal, septum midline, mucosa normal, no drainage    or sinus tenderness   Throat:   Lips, mucosa, and tongue normal   Neck:   Supple, symmetrical, trachea midline, no adenopathy;     thyroid:  no " enlargement/tenderness/nodules; no carotid    bruit or JVD   Back:     Symmetric, no curvature, ROM normal, no CVA tenderness   Lungs:     Clear to auscultation bilaterally, respirations unlabored   Chest Wall:    No tenderness or deformity    Heart:    Regular rate and rhythm, S1 and S2 normal, no murmur, rub   or gallop   Abdomen:     Soft, nontender, bowel sounds active all four quadrants,     no masses, no hepatomegaly, no splenomegaly   Extremities:   Extremities normal, atraumatic, no cyanosis or edema                       .    Data Review:  Labs in chart were reviewed.  WBC   Date Value Ref Range Status   11/15/2023 14.82 (H) 3.40 - 10.80 10*3/mm3 Final     Hemoglobin   Date Value Ref Range Status   11/15/2023 16.7 13.0 - 17.7 g/dL Final     Hematocrit   Date Value Ref Range Status   11/15/2023 50.3 37.5 - 51.0 % Final     Platelets   Date Value Ref Range Status   11/15/2023 255 140 - 450 10*3/mm3 Final     Sodium   Date Value Ref Range Status   11/15/2023 136 136 - 145 mmol/L Final     Potassium   Date Value Ref Range Status   11/15/2023 4.1 3.5 - 5.2 mmol/L Final     Chloride   Date Value Ref Range Status   11/15/2023 103 98 - 107 mmol/L Final     CO2   Date Value Ref Range Status   11/15/2023 21.0 (L) 22.0 - 29.0 mmol/L Final     BUN   Date Value Ref Range Status   11/15/2023 5 (L) 6 - 20 mg/dL Final     Creatinine   Date Value Ref Range Status   11/15/2023 1.01 0.76 - 1.27 mg/dL Final     Glucose   Date Value Ref Range Status   11/15/2023 135 (H) 65 - 99 mg/dL Final     Calcium   Date Value Ref Range Status   11/15/2023 9.2 8.6 - 10.5 mg/dL Final                Imaging Results (All)       Procedure Component Value Units Date/Time    US Abdomen Limited [[id number]] Collected: 11/15/23 1654     Updated: 11/15/23 1658    Narrative:      Examination: Abdominal sonogram     TECHNIQUE: Sonographic images of the abdomen were obtained     HISTORY: Fever with elevated liver function studies     COMPARISON:  9/1/2020     FINDINGS: The pancreatic head and body are normal. The liver is normal  in echogenicity, without mass seen in visualized portions. The portal  vein is patent, with antegrade flow. The gallbladder is normal, without  shadowing stone or mural edema seen. The common duct measures 4 mm in  its midportion. The right kidney is normal in echogenicity, without  hydronephrosis, measuring approximately 12 cm.       Impression:      Normal right upper quadrant sonogram.     This report was finalized on 11/15/2023 4:55 PM by Dr. Adama Jones M.D  on Workstation: [id number]       XR Chest 1 View [[id number]] Collected: 11/15/23 1416     Updated: 11/15/23 1419    Narrative:      PORTABLE CHEST X-RAY     HISTORY: Fever.     Portable chest x-ray is provided. Correlation: November 14, 2023.     FINDINGS: The cardiomediastinal silhouette is normal. The lungs are  clear. The costophrenic sulci are dry and the bones appear normal. There  is no pneumothorax.       Impression:      Negative.     This report was finalized on 11/15/2023 2:16 PM by Dr. Amadeo Hilton M.D on Workstation: [id number]                 Assessment:  Active Hospital Problems    Diagnosis  POA    **Fever [R50.9]  Yes      Resolved Hospital Problems   No resolved problems to display.   Nausea and vomiting  Diabetes  Hypertension  Obesity   sarcoidosis    Plan:  Continue benadryl, steroids, add pepcid  Continue rocephin for now  Fluids  Trend labs  Dw patient and ed provider    Margaux Garcia MD  11/15/2023  21:01 EST

## 2023-11-16 NOTE — PLAN OF CARE
Goal Outcome Evaluation:  Plan of Care Reviewed With: patient        Progress: improving  Outcome Evaluation: vss. telemetry monitor, sr/st. alert and oriented x4, room air. up ad lin. respiratory panel negative. iv steroids. ac/hs.

## 2023-11-16 NOTE — PROGRESS NOTES
Name: Josiah Loomis ADMIT: 11/15/2023   : 1985  PCP: Kodi Rosario DO    MRN: [id number] LOS: 0 days   AGE/SEX: 38 y.o. male  ROOM: [facility]     Subjective   Subjective   Feels much better today.  Hives are improving.  Lip swelling resolved.  No sore throat.  Had episode of nausea/vomiting overnight.  Tolerating diet now.       Objective   Objective   Vital Signs  Temp:  [98.3 °F (36.8 °C)-100.1 °F (37.8 °C)] 98.3 °F (36.8 °C)  Heart Rate:  [] 90  Resp:  [18-40] 18  BP: (124-140)/(74-92) 140/91  SpO2:  [92 %-97 %] 97 %  on   ;   Device (Oxygen Therapy): room air  Body mass index is 47.74 kg/m².  Physical Exam  Vitals and nursing note reviewed.   Constitutional:       Appearance: Normal appearance.   Pulmonary:      Effort: Pulmonary effort is normal.   Neurological:      Mental Status: He is alert. Mental status is at baseline.   Psychiatric:         Mood and Affect: Mood normal.       Results Review     I reviewed the patient's new clinical results.  Results from last 7 days   Lab Units 23  0517 11/15/23  1349 23  0804   WBC 10*3/mm3 16.22* 14.82* 8.68   HEMOGLOBIN g/dL 16.4 16.7 15.3   PLATELETS 10*3/mm3 269 255 251     Results from last 7 days   Lab Units 23  0517 11/15/23  1349 23  0804   SODIUM mmol/L 136 136 138   POTASSIUM mmol/L 4.3 4.1 4.1   CHLORIDE mmol/L 103 103 101   CO2 mmol/L 21.7* 21.0* 28.8   BUN mg/dL 6 5* 6   CREATININE mg/dL 0.95 1.01 1.05   GLUCOSE mg/dL 181* 135* 140*   EGFR mL/min/1.73 106.4 98.8 94.3     Results from last 7 days   Lab Units 11/15/23  1349 23  0804   ALBUMIN g/dL 3.8 4.3   BILIRUBIN mg/dL 1.3* 0.7   ALK PHOS U/L 172* 179*   AST (SGOT) U/L 45* 58*   ALT (SGPT) U/L 64* 63*     Results from last 7 days   Lab Units 23  0517 11/15/23  1349 23  0804   CALCIUM mg/dL 8.7 9.2 9.3   ALBUMIN g/dL  --  3.8 4.3     Results from last 7 days   Lab Units 11/15/23  1349   PROCALCITONIN ng/mL 0.15   LACTATE mmol/L 0.9     Glucose    Date/Time Value Ref Range Status   11/16/2023 1140 229 (H) 70 - 130 mg/dL Final   11/16/2023 0650 197 (H) 70 - 130 mg/dL Final   11/15/2023 2226 151 (H) 70 - 130 mg/dL Final       US Abdomen Limited    Result Date: 11/15/2023  Normal right upper quadrant sonogram.  This report was finalized on 11/15/2023 4:55 PM by Dr. Adama Jones M.D on Workstation: [id number]      XR Chest 1 View    Result Date: 11/15/2023  Negative.  This report was finalized on 11/15/2023 2:16 PM by Dr. Amadeo Hilton M.D on Workstation: [id number]       I have personally reviewed all medications:  Scheduled Medications  famotidine, 20 mg, Intravenous, Q12H  fluorometholone, 1 drop, Both Eyes, TID  insulin glargine, 11 Units, Subcutaneous, Nightly  insulin lispro, 2-9 Units, Subcutaneous, 4x Daily AC & at Bedtime  methylPREDNISolone sodium succinate, 40 mg, Intravenous, Q8H  rosuvastatin, 40 mg, Oral, Nightly  senna-docusate sodium, 2 tablet, Oral, BID    Infusions     Diet  Diet: Cardiac Diets; Healthy Heart (2-3 Na+); Texture: Regular Texture (IDDSI 7); Fluid Consistency: Thin (IDDSI 0)    I have personally reviewed:  [x]  Laboratory   [x]  Microbiology   [x]  Radiology   [x]  EKG/Telemetry  [x]  Cardiology/Vascular   []  Pathology    []  Records       Assessment/Plan     Active Hospital Problems    Diagnosis  POA    **Fever [R50.9]  Yes    Allergic reaction caused by a drug [T78.40XA]  Yes    Sarcoidosis [D86.9]  Yes    Type 2 diabetes mellitus without complication, with long-term current use of insulin [E11.9, Z79.4]  Not Applicable    Morbidly obese [E66.01]  Yes      Resolved Hospital Problems   No resolved problems to display.       38 y.o. male admitted with Fever.    Infectious work-up is negative thus far.  Chest x-ray and right upper quadrant ultrasound negative.  Blood cultures negative at 24 hours.  Throat culture negative.  Viral respiratory panel not yet performed.  Given initial complaint of sore throat feel worth checking  for respiratory viruses.  If positive this would explain source of fever and be more reassuring about letting him go home.  Given his immunocompromise state if PCR is negative will monitor again overnight to make sure no recurrence of fever and that blood cultures remain negative.  Difficult to interpret leukocytosis given that he is currently on IV steroids though WBC 14.8 initially seemingly was before he received any steroids.  Reassess labs in a.m. including PCT.    Allergic reaction symptoms seem resolved.  Patient thinks it was due to the Zofran he received initially in the ED or the GI cocktail.    We will stop IV fluids.  DC steroids after tonight's dose.  24 hours should be sufficient.  Patient without any audible wheezing.      SCDs for DVT prophylaxis.  Full code.  Discussed with patient and nursing staff.  Anticipate discharge home tomorrow.      Tu Burgess MD  [facility]  11/16/23  15:23 EST

## 2023-11-16 NOTE — PLAN OF CARE
Goal Outcome Evaluation:  Plan of Care Reviewed With: patient        Progress: no change  Outcome Evaluation: Pt A&O x4, standby assist, ST, low grade fever, PRN Tylenol, PRN Benadryl for itching d/t IV Zofran, PRN Compazine ordered, IV fluids and steroids ordered, family at bedside, will CTM

## 2023-11-17 ENCOUNTER — APPOINTMENT (OUTPATIENT)
Dept: GENERAL RADIOLOGY | Facility: HOSPITAL | Age: 38
End: 2023-11-17
Payer: COMMERCIAL

## 2023-11-17 ENCOUNTER — READMISSION MANAGEMENT (OUTPATIENT)
Dept: CALL CENTER | Facility: HOSPITAL | Age: 38
End: 2023-11-17
Payer: COMMERCIAL

## 2023-11-17 ENCOUNTER — HOSPITAL ENCOUNTER (OUTPATIENT)
Facility: HOSPITAL | Age: 38
Setting detail: OBSERVATION
Discharge: HOME | End: 2023-11-20
Attending: EMERGENCY MEDICINE | Admitting: STUDENT IN AN ORGANIZED HEALTH CARE EDUCATION/TRAINING PROGRAM
Payer: COMMERCIAL

## 2023-11-17 VITALS
SYSTOLIC BLOOD PRESSURE: 150 MMHG | OXYGEN SATURATION: 96 % | HEIGHT: 72 IN | TEMPERATURE: 97.9 F | WEIGHT: 315 LBS | DIASTOLIC BLOOD PRESSURE: 86 MMHG | RESPIRATION RATE: 20 BRPM | HEART RATE: 85 BPM | BODY MASS INDEX: 42.66 KG/M2

## 2023-11-17 DIAGNOSIS — R07.9 CHEST PAIN, UNSPECIFIED TYPE: ICD-10-CM

## 2023-11-17 DIAGNOSIS — I82.462 ACUTE DEEP VEIN THROMBOSIS (DVT) OF CALF MUSCLE VEIN OF LEFT LOWER EXTREMITY: ICD-10-CM

## 2023-11-17 DIAGNOSIS — D72.829 LEUKOCYTOSIS, UNSPECIFIED TYPE: ICD-10-CM

## 2023-11-17 DIAGNOSIS — R10.13 EPIGASTRIC PAIN: Primary | ICD-10-CM

## 2023-11-17 LAB
ALBUMIN SERPL-MCNC: 3.5 G/DL (ref 3.5–5.2)
ALBUMIN SERPL-MCNC: 3.8 G/DL (ref 3.5–5.2)
ALBUMIN/GLOB SERPL: 1.2 G/DL
ALBUMIN/GLOB SERPL: 1.2 G/DL
ALP SERPL-CCNC: 118 U/L (ref 39–117)
ALP SERPL-CCNC: 131 U/L (ref 39–117)
ALT SERPL W P-5'-P-CCNC: 33 U/L (ref 1–41)
ALT SERPL W P-5'-P-CCNC: 35 U/L (ref 1–41)
ANION GAP SERPL CALCULATED.3IONS-SCNC: 7.7 MMOL/L (ref 5–15)
ANION GAP SERPL CALCULATED.3IONS-SCNC: 9 MMOL/L (ref 5–15)
APTT PPP: 22.7 SECONDS (ref 22.7–35.4)
AST SERPL-CCNC: 16 U/L (ref 1–40)
AST SERPL-CCNC: 18 U/L (ref 1–40)
BACTERIA SPEC AEROBE CULT: NORMAL
BASOPHILS # BLD AUTO: 0.03 10*3/MM3 (ref 0–0.2)
BASOPHILS NFR BLD AUTO: 0.1 % (ref 0–1.5)
BILIRUB SERPL-MCNC: 0.4 MG/DL (ref 0–1.2)
BILIRUB SERPL-MCNC: 0.5 MG/DL (ref 0–1.2)
BUN SERPL-MCNC: 10 MG/DL (ref 6–20)
BUN SERPL-MCNC: 15 MG/DL (ref 6–20)
BUN/CREAT SERPL: 11.6 (ref 7–25)
BUN/CREAT SERPL: 14.2 (ref 7–25)
CALCIUM SPEC-SCNC: 9.2 MG/DL (ref 8.6–10.5)
CALCIUM SPEC-SCNC: 9.4 MG/DL (ref 8.6–10.5)
CHLORIDE SERPL-SCNC: 104 MMOL/L (ref 98–107)
CHLORIDE SERPL-SCNC: 104 MMOL/L (ref 98–107)
CO2 SERPL-SCNC: 25 MMOL/L (ref 22–29)
CO2 SERPL-SCNC: 25.3 MMOL/L (ref 22–29)
CREAT SERPL-MCNC: 0.86 MG/DL (ref 0.76–1.27)
CREAT SERPL-MCNC: 1.06 MG/DL (ref 0.76–1.27)
DEPRECATED RDW RBC AUTO: 37.7 FL (ref 37–54)
DEPRECATED RDW RBC AUTO: 41.5 FL (ref 37–54)
EGFRCR SERPLBLD CKD-EPI 2021: 115.1 ML/MIN/1.73
EGFRCR SERPLBLD CKD-EPI 2021: 93.3 ML/MIN/1.73
EOSINOPHIL # BLD AUTO: 0.05 10*3/MM3 (ref 0–0.4)
EOSINOPHIL NFR BLD AUTO: 0.2 % (ref 0.3–6.2)
ERYTHROCYTE [DISTWIDTH] IN BLOOD BY AUTOMATED COUNT: 13.4 % (ref 12.3–15.4)
ERYTHROCYTE [DISTWIDTH] IN BLOOD BY AUTOMATED COUNT: 14 % (ref 12.3–15.4)
GLOBULIN UR ELPH-MCNC: 3 GM/DL
GLOBULIN UR ELPH-MCNC: 3.3 GM/DL
GLUCOSE BLDC GLUCOMTR-MCNC: 203 MG/DL (ref 70–130)
GLUCOSE SERPL-MCNC: 100 MG/DL (ref 65–99)
GLUCOSE SERPL-MCNC: 205 MG/DL (ref 65–99)
HCT VFR BLD AUTO: 45.2 % (ref 37.5–51)
HCT VFR BLD AUTO: 47.5 % (ref 37.5–51)
HGB BLD-MCNC: 15.2 G/DL (ref 13–17.7)
HGB BLD-MCNC: 15.3 G/DL (ref 13–17.7)
IMM GRANULOCYTES # BLD AUTO: 0.16 10*3/MM3 (ref 0–0.05)
IMM GRANULOCYTES NFR BLD AUTO: 0.7 % (ref 0–0.5)
INR PPP: 1.05 (ref 0.9–1.1)
LIPASE SERPL-CCNC: 59 U/L (ref 13–60)
LYMPHOCYTES # BLD AUTO: 3.03 10*3/MM3 (ref 0.7–3.1)
LYMPHOCYTES NFR BLD AUTO: 13.4 % (ref 19.6–45.3)
MCH RBC QN AUTO: 26.1 PG (ref 26.6–33)
MCH RBC QN AUTO: 26.4 PG (ref 26.6–33)
MCHC RBC AUTO-ENTMCNC: 32.2 G/DL (ref 31.5–35.7)
MCHC RBC AUTO-ENTMCNC: 33.6 G/DL (ref 31.5–35.7)
MCV RBC AUTO: 78.5 FL (ref 79–97)
MCV RBC AUTO: 80.9 FL (ref 79–97)
MONOCYTES # BLD AUTO: 0.78 10*3/MM3 (ref 0.1–0.9)
MONOCYTES NFR BLD AUTO: 3.4 % (ref 5–12)
NEUTROPHILS NFR BLD AUTO: 18.58 10*3/MM3 (ref 1.7–7)
NEUTROPHILS NFR BLD AUTO: 82.2 % (ref 42.7–76)
NRBC BLD AUTO-RTO: 0 /100 WBC (ref 0–0.2)
NT-PROBNP SERPL-MCNC: <36 PG/ML (ref 0–450)
PLATELET # BLD AUTO: 278 10*3/MM3 (ref 140–450)
PLATELET # BLD AUTO: 370 10*3/MM3 (ref 140–450)
PMV BLD AUTO: 10 FL (ref 6–12)
PMV BLD AUTO: 10.1 FL (ref 6–12)
POTASSIUM SERPL-SCNC: 4 MMOL/L (ref 3.5–5.2)
POTASSIUM SERPL-SCNC: 4.4 MMOL/L (ref 3.5–5.2)
PROT SERPL-MCNC: 6.5 G/DL (ref 6–8.5)
PROT SERPL-MCNC: 7.1 G/DL (ref 6–8.5)
PROTHROMBIN TIME: 13.8 SECONDS (ref 11.7–14.2)
RBC # BLD AUTO: 5.76 10*6/MM3 (ref 4.14–5.8)
RBC # BLD AUTO: 5.87 10*6/MM3 (ref 4.14–5.8)
SODIUM SERPL-SCNC: 137 MMOL/L (ref 136–145)
SODIUM SERPL-SCNC: 138 MMOL/L (ref 136–145)
TROPONIN T SERPL HS-MCNC: 9 NG/L
WBC NRBC COR # BLD AUTO: 17.74 10*3/MM3 (ref 3.4–10.8)
WBC NRBC COR # BLD AUTO: 22.63 10*3/MM3 (ref 3.4–10.8)

## 2023-11-17 PROCEDURE — 85027 COMPLETE CBC AUTOMATED: CPT | Performed by: HOSPITALIST

## 2023-11-17 PROCEDURE — 84484 ASSAY OF TROPONIN QUANT: CPT | Performed by: EMERGENCY MEDICINE

## 2023-11-17 PROCEDURE — G0378 HOSPITAL OBSERVATION PER HR: HCPCS

## 2023-11-17 PROCEDURE — 80053 COMPREHEN METABOLIC PANEL: CPT | Performed by: HOSPITALIST

## 2023-11-17 PROCEDURE — 82948 REAGENT STRIP/BLOOD GLUCOSE: CPT

## 2023-11-17 PROCEDURE — 93010 ELECTROCARDIOGRAM REPORT: CPT | Performed by: INTERNAL MEDICINE

## 2023-11-17 PROCEDURE — 99285 EMERGENCY DEPT VISIT HI MDM: CPT

## 2023-11-17 PROCEDURE — 83880 ASSAY OF NATRIURETIC PEPTIDE: CPT | Performed by: EMERGENCY MEDICINE

## 2023-11-17 PROCEDURE — 85025 COMPLETE CBC W/AUTO DIFF WBC: CPT | Performed by: EMERGENCY MEDICINE

## 2023-11-17 PROCEDURE — 85610 PROTHROMBIN TIME: CPT | Performed by: EMERGENCY MEDICINE

## 2023-11-17 PROCEDURE — 96376 TX/PRO/DX INJ SAME DRUG ADON: CPT

## 2023-11-17 PROCEDURE — 93005 ELECTROCARDIOGRAM TRACING: CPT | Performed by: EMERGENCY MEDICINE

## 2023-11-17 PROCEDURE — 85730 THROMBOPLASTIN TIME PARTIAL: CPT | Performed by: EMERGENCY MEDICINE

## 2023-11-17 PROCEDURE — 83690 ASSAY OF LIPASE: CPT | Performed by: EMERGENCY MEDICINE

## 2023-11-17 PROCEDURE — 96374 THER/PROPH/DIAG INJ IV PUSH: CPT

## 2023-11-17 PROCEDURE — 63710000001 DIPHENHYDRAMINE PER 50 MG: Performed by: HOSPITALIST

## 2023-11-17 PROCEDURE — 63710000001 INSULIN LISPRO (HUMAN) PER 5 UNITS: Performed by: INTERNAL MEDICINE

## 2023-11-17 PROCEDURE — 80053 COMPREHEN METABOLIC PANEL: CPT | Performed by: EMERGENCY MEDICINE

## 2023-11-17 PROCEDURE — 71045 X-RAY EXAM CHEST 1 VIEW: CPT

## 2023-11-17 PROCEDURE — 83605 ASSAY OF LACTIC ACID: CPT | Performed by: EMERGENCY MEDICINE

## 2023-11-17 RX ORDER — PANTOPRAZOLE SODIUM 40 MG/10ML
40 INJECTION, POWDER, LYOPHILIZED, FOR SOLUTION INTRAVENOUS ONCE
Status: COMPLETED | OUTPATIENT
Start: 2023-11-17 | End: 2023-11-17

## 2023-11-17 RX ORDER — DIPHENHYDRAMINE HCL 50 MG
50 CAPSULE ORAL EVERY 6 HOURS PRN
Start: 2023-11-17

## 2023-11-17 RX ORDER — SODIUM CHLORIDE 0.9 % (FLUSH) 0.9 %
10 SYRINGE (ML) INJECTION AS NEEDED
Status: DISCONTINUED | OUTPATIENT
Start: 2023-11-17 | End: 2023-11-20 | Stop reason: HOSPADM

## 2023-11-17 RX ORDER — ALUMINA, MAGNESIA, AND SIMETHICONE 2400; 2400; 240 MG/30ML; MG/30ML; MG/30ML
15 SUSPENSION ORAL ONCE
Status: COMPLETED | OUTPATIENT
Start: 2023-11-17 | End: 2023-11-17

## 2023-11-17 RX ADMIN — INSULIN LISPRO 4 UNITS: 100 INJECTION, SOLUTION INTRAVENOUS; SUBCUTANEOUS at 09:10

## 2023-11-17 RX ADMIN — FAMOTIDINE 20 MG: 10 INJECTION INTRAVENOUS at 09:10

## 2023-11-17 RX ADMIN — DIPHENHYDRAMINE HCL 50 MG: 50 CAPSULE ORAL at 06:26

## 2023-11-17 RX ADMIN — PANTOPRAZOLE SODIUM 40 MG: 40 INJECTION, POWDER, FOR SOLUTION INTRAVENOUS at 23:27

## 2023-11-17 RX ADMIN — ALUMINUM HYDROXIDE, MAGNESIUM HYDROXIDE, AND DIMETHICONE 15 ML: 400; 400; 40 SUSPENSION ORAL at 23:26

## 2023-11-17 RX ADMIN — FLUOROMETHOLONE 1 DROP: 1 SOLUTION/ DROPS OPHTHALMIC at 09:10

## 2023-11-17 NOTE — PLAN OF CARE
Goal Outcome Evaluation:      VSS, NSR-ST, RA. Up ad lin. No nausea or pain per pt. New IV in L wrist. IV steroid x1. Possible D/c home today.

## 2023-11-17 NOTE — DISCHARGE SUMMARY
Patient Name: Josiah Loomis  : 1985  MRN: [id number]    Date of Admission: 11/15/2023  Date of Discharge:  2023  Primary Care Physician: Kodi Rosario DO      Chief Complaint:   Allergic Reaction      Discharge Diagnoses     Active Hospital Problems    Diagnosis  POA    **Fever [R50.9]  Yes    Allergic reaction caused by a drug [T78.40XA]  Yes    Sarcoidosis [D86.9]  Yes    Type 2 diabetes mellitus without complication, with long-term current use of insulin [E11.9, Z79.4]  Not Applicable    Morbidly obese [E66.01]  Yes      Resolved Hospital Problems   No resolved problems to display.        Hospital Course     38-year-old gentleman who was initially seen in the emergency department with some GI complaints but was discharged after receiving Zofran and a GI cocktail.  He came back in with a allergic reaction to some he received while in the emergency department.  He had swelling of his lower lip and tingling in his throat.  He also had urticaria.  He was found to have fever of 101.5 and was given a dose of Rocephin and admitted to the hospital for observation.  Chest x-ray was negative.  Blood cultures drawn are thus far negative.  Viral respiratory panel also negative.  No obvious source of his fever but it seems to have resolved.  He subjectively feels much better and has had no further fever.  Sore throat present on admission now resolved.  Lip swelling has resolved as well.  He did receive some IV steroids which has caused his WBC to go up.  Advised that he continue taking Benadryl for the next day or 2 but see no indication for any further steroids.  He was instructed to call his PCP and/or present back to the hospital if develops any further fever, shaking chills, new symptoms or concerns etc.     He had elevations in his LFTs on admission that have now resolved.  Right upper quadrant ultrasound normal.      Day of Discharge     Subjective:  No new complaints today.    Physical  Exam:  Temp:  [97.5 °F (36.4 °C)-98.3 °F (36.8 °C)] 97.9 °F (36.6 °C)  Heart Rate:  [58-92] 85  Resp:  [18-20] 20  BP: (140-163)/(73-94) 150/86  Body mass index is 47.74 kg/m².  Physical Exam  Vitals and nursing note reviewed.   Constitutional:       General: He is not in acute distress.  Cardiovascular:      Rate and Rhythm: Normal rate and regular rhythm.   Pulmonary:      Effort: Pulmonary effort is normal.      Breath sounds: Normal breath sounds.   Abdominal:      General: Bowel sounds are normal.      Palpations: Abdomen is soft.      Tenderness: There is no abdominal tenderness.   Musculoskeletal:         General: No swelling.   Skin:     General: Skin is warm and dry.   Neurological:      Mental Status: He is alert. Mental status is at baseline.         Consultants     Consult Orders (all) (From admission, onward)       Start     Ordered    11/15/23 1705  Inpatient Case Management  Consult  Once        Provider:  (Not yet assigned)    11/15/23 1705                  Procedures     Imaging Results (All)       Procedure Component Value Units Date/Time    US Abdomen Limited [[id number]] Collected: 11/15/23 1654     Updated: 11/15/23 1658    Narrative:      Examination: Abdominal sonogram     TECHNIQUE: Sonographic images of the abdomen were obtained     HISTORY: Fever with elevated liver function studies     COMPARISON: 9/1/2020     FINDINGS: The pancreatic head and body are normal. The liver is normal  in echogenicity, without mass seen in visualized portions. The portal  vein is patent, with antegrade flow. The gallbladder is normal, without  shadowing stone or mural edema seen. The common duct measures 4 mm in  its midportion. The right kidney is normal in echogenicity, without  hydronephrosis, measuring approximately 12 cm.       Impression:      Normal right upper quadrant sonogram.     This report was finalized on 11/15/2023 4:55 PM by Dr. Adama Jones M.D  on Workstation: [id number]        "XR Chest 1 View [[id number]] Collected: 11/15/23 1416     Updated: 11/15/23 1419    Narrative:      PORTABLE CHEST X-RAY     HISTORY: Fever.     Portable chest x-ray is provided. Correlation: November 14, 2023.     FINDINGS: The cardiomediastinal silhouette is normal. The lungs are  clear. The costophrenic sulci are dry and the bones appear normal. There  is no pneumothorax.       Impression:      Negative.     This report was finalized on 11/15/2023 2:16 PM by Dr. Amadeo Hilton M.D on Workstation: [id number]                 Pertinent Labs     Results from last 7 days   Lab Units 11/17/23  0516 11/16/23  0517 11/15/23  1349 11/14/23  0804   WBC 10*3/mm3 17.74* 16.22* 14.82* 8.68   HEMOGLOBIN g/dL 15.3 16.4 16.7 15.3   PLATELETS 10*3/mm3 278 269 255 251     Results from last 7 days   Lab Units 11/17/23  0516 11/16/23  0517 11/15/23  1349 11/14/23  0804   SODIUM mmol/L 137 136 136 138   POTASSIUM mmol/L 4.4 4.3 4.1 4.1   CHLORIDE mmol/L 104 103 103 101   CO2 mmol/L 25.3 21.7* 21.0* 28.8   BUN mg/dL 10 6 5* 6   CREATININE mg/dL 0.86 0.95 1.01 1.05   GLUCOSE mg/dL 205* 181* 135* 140*   EGFR mL/min/1.73 115.1 106.4 98.8 94.3     Results from last 7 days   Lab Units 11/17/23  0516 11/15/23  1349 11/14/23  0804   ALBUMIN g/dL 3.8 3.8 4.3   BILIRUBIN mg/dL 0.5 1.3* 0.7   ALK PHOS U/L 131* 172* 179*   AST (SGOT) U/L 16 45* 58*   ALT (SGPT) U/L 35 64* 63*     Results from last 7 days   Lab Units 11/17/23  0516 11/16/23  0517 11/15/23  1349 11/14/23  0804   CALCIUM mg/dL 9.4 8.7 9.2 9.3   ALBUMIN g/dL 3.8  --  3.8 4.3     Results from last 7 days   Lab Units 11/14/23  0804   LIPASE U/L 54     Results from last 7 days   Lab Units 11/14/23  1006 11/14/23  0804   HSTROP T ng/L 9 7           Invalid input(s): \"LDLCALC\"  Results from last 7 days   Lab Units 11/15/23  1413 11/15/23  1349   BLOODCX  No growth at 24 hours No growth at 24 hours     Results from last 7 days   Lab Units 11/16/23  1510   COVID19  Not Detected       Test " Results Pending at Discharge     Pending Labs       Order Current Status    Beta Strep Culture, Throat - Swab, Throat Preliminary result    Blood Culture - Blood, Arm, Left Preliminary result    Blood Culture - Blood, Arm, Left Preliminary result            Discharge Details        Discharge Medications        New Medications        Instructions Start Date   diphenhydrAMINE 50 MG capsule  Commonly known as: BENADRYL   50 mg, Oral, Every 6 Hours PRN             Continue These Medications        Instructions Start Date   BD Pen Needle Dory 2nd Gen 32G X 4 MM misc  Generic drug: Insulin Pen Needle   USE AS DIRECTED FOUR TIMES DAILY      famotidine 40 MG tablet  Commonly known as: PEPCID   TAKE 1 TABLET BY MOUTH ONCE DAILY AT NIGHT      FreeStyle Checo 3 Sensor misc   1 each, Does not apply, Every 14 Days      glucose blood test strip  Commonly known as: Accu-Chek Guide   1 each, Other, 4 Times Daily, Use as instructed. Dx. E11.9      Humira 40 MG/0.4ML Prefilled Syringe Kit injection  Generic drug: Adalimumab   40 mg, Subcutaneous, Once      Insulin Lispro 100 UNIT/ML injection pen  Commonly known as: ADMELOG SOLOSTAR   Inject up to 36 units daily under skin via sliding scale. (3u) 150-200, (6u) 201-250, (9u) 251-300, (12u) 301-350      Lancets 30G misc   1 Device, Does not apply, 4 Times Daily, Dx. E11.9      NovoLOG FlexPen ReliOn 100 UNIT/ML solution pen-injector sc pen  Generic drug: insulin aspart   INJECT UP TO 36 UNITS DAILY VIA SLIDING SCALE(3U)150-200, 6U(201-250), 9U(251-300), 12 USE(301-351),      ondansetron 4 MG tablet  Commonly known as: ZOFRAN   4-8 mg, Oral      prednisoLONE acetate 1 % ophthalmic suspension  Commonly known as: PRED FORTE   1 drop, Both Eyes, Daily      predniSONE 5 MG tablet  Commonly known as: DELTASONE   TAKE 6 TABLETS BY MOUTH 1 TIME DAILY FOR 2 WEEKS. THEN TAKE 4 TABLETS BY MOUTH DAILY FOR 4 WEEKS.      rosuvastatin 40 MG tablet  Commonly known as: CRESTOR   TAKE 1 TABLET BY MOUTH  ONCE DAILY AT NIGHT      Semaglutide (1 MG/DOSE) 4 MG/3ML solution pen-injector  Commonly known as: OZEMPIC   1 mg, Subcutaneous, Weekly      TIMOLOL-DORZOLAMID-LATANOPROST OP   Ophthalmic      Tresiba FlexTouch 100 UNIT/ML solution pen-injector injection  Generic drug: insulin degludec   11 Units, Subcutaneous, Nightly      vitamin D3 125 MCG (5000 UT) tablet tablet   5,000 Units, Oral, Daily             Stop These Medications      azaTHIOprine 50 MG tablet  Commonly known as: IMURAN              Allergies   Allergen Reactions    Zofran [Ondansetron] Itching     IV form only       Discharge Disposition:  Home or Self Care      Discharge Diet:  Diet Order   Procedures    Diet: Cardiac Diets; Healthy Heart (2-3 Na+); Texture: Regular Texture (IDDSI 7); Fluid Consistency: Thin (IDDSI 0)       Discharge Activity:   Activity Instructions       Activity as Tolerated              CODE STATUS:    Code Status and Medical Interventions:   Ordered at: 11/15/23 1721     Code Status (Patient has no pulse and is not breathing):    CPR (Attempt to Resuscitate)     Medical Interventions (Patient has pulse or is breathing):    Full       Future Appointments   Date Time Provider Department Center   2/6/2024  3:15 PM Kodi Rosario DO [facility]     Additional Instructions for the Follow-ups that You Need to Schedule       Discharge Follow-up with PCP   As directed       Currently Documented PCP:    Kodi Rosario DO    PCP Phone Number:    [phone]     Follow Up Details: 1 to 2 weeks (or sooner if problems)               Follow-up Information       Kodi Rosario DO .    Specialties: Family Medicine, Urgent Care, Emergency Medicine  Why: 1 to 2 weeks (or sooner if problems)  Contact information:  [address]  [place] [address]  [phone]                             Additional Instructions for the Follow-ups that You Need to Schedule       Discharge Follow-up with PCP   As directed       Currently  Documented PCP:    Kodi Rosario DO    PCP Phone Number:    [phone]     Follow Up Details: 1 to 2 weeks (or sooner if problems)            Time Spent on Discharge:  Greater than 30 minutes      Tu Burgess MD  [facility]  11/17/23  09:29 EST

## 2023-11-17 NOTE — PLAN OF CARE
Goal Outcome Evaluation:  Plan of Care Reviewed With: patient        Progress: improving  Outcome Evaluation: vss. telemetry monitor, sr. alert and oriented x4, room air. up ad lin. ac/hs. to be discharged

## 2023-11-18 ENCOUNTER — APPOINTMENT (OUTPATIENT)
Dept: CARDIOLOGY | Facility: HOSPITAL | Age: 38
End: 2023-11-18
Payer: COMMERCIAL

## 2023-11-18 ENCOUNTER — APPOINTMENT (OUTPATIENT)
Dept: CT IMAGING | Facility: HOSPITAL | Age: 38
End: 2023-11-18
Payer: COMMERCIAL

## 2023-11-18 ENCOUNTER — ON CAMPUS - OUTPATIENT (OUTPATIENT)
Dept: URBAN - METROPOLITAN AREA HOSPITAL 114 | Facility: HOSPITAL | Age: 38
End: 2023-11-18

## 2023-11-18 DIAGNOSIS — R10.13 EPIGASTRIC PAIN: ICD-10-CM

## 2023-11-18 DIAGNOSIS — K21.9 GASTRO-ESOPHAGEAL REFLUX DISEASE WITHOUT ESOPHAGITIS: ICD-10-CM

## 2023-11-18 DIAGNOSIS — R74.8 ABNORMAL LEVELS OF OTHER SERUM ENZYMES: ICD-10-CM

## 2023-11-18 DIAGNOSIS — D72.829 ELEVATED WHITE BLOOD CELL COUNT, UNSPECIFIED: ICD-10-CM

## 2023-11-18 DIAGNOSIS — Z79.899 OTHER LONG TERM (CURRENT) DRUG THERAPY: ICD-10-CM

## 2023-11-18 LAB
ANION GAP SERPL CALCULATED.3IONS-SCNC: 8.9 MMOL/L (ref 5–15)
AORTIC DIMENSIONLESS INDEX: 0.9 (DI)
ASCENDING AORTA: 2.7 CM
BH CV ECHO MEAS - ACS: 2.32 CM
BH CV ECHO MEAS - AO MAX PG: 7 MMHG
BH CV ECHO MEAS - AO MEAN PG: 3.6 MMHG
BH CV ECHO MEAS - AO ROOT DIAM: 2.8 CM
BH CV ECHO MEAS - AO V2 MAX: 135 CM/SEC
BH CV ECHO MEAS - AO V2 VTI: 20.4 CM
BH CV ECHO MEAS - AVA(I,D): 3.8 CM2
BH CV ECHO MEAS - CONTRAST EF 4CH: 58 CM2
BH CV ECHO MEAS - EDV(CUBED): 86.8 ML
BH CV ECHO MEAS - EDV(MOD-SP2): 148 ML
BH CV ECHO MEAS - EDV(MOD-SP4): 121 ML
BH CV ECHO MEAS - EF(MOD-BP): 58.4 %
BH CV ECHO MEAS - EF(MOD-SP2): 60.8 %
BH CV ECHO MEAS - EF(MOD-SP4): 55.4 %
BH CV ECHO MEAS - ESV(CUBED): 33.3 ML
BH CV ECHO MEAS - ESV(MOD-SP2): 58 ML
BH CV ECHO MEAS - ESV(MOD-SP4): 54 ML
BH CV ECHO MEAS - FS: 27.4 %
BH CV ECHO MEAS - IVS/LVPW: 1.06 CM
BH CV ECHO MEAS - IVSD: 1.12 CM
BH CV ECHO MEAS - LAT PEAK E' VEL: 8.4 CM/SEC
BH CV ECHO MEAS - LV MASS(C)D: 168.3 GRAMS
BH CV ECHO MEAS - LV MAX PG: 4.9 MMHG
BH CV ECHO MEAS - LV MEAN PG: 2.32 MMHG
BH CV ECHO MEAS - LV V1 MAX: 111 CM/SEC
BH CV ECHO MEAS - LV V1 VTI: 18 CM
BH CV ECHO MEAS - LVIDD: 4.4 CM
BH CV ECHO MEAS - LVIDS: 3.2 CM
BH CV ECHO MEAS - LVOT AREA: 4.4 CM2
BH CV ECHO MEAS - LVOT DIAM: 2.36 CM
BH CV ECHO MEAS - LVPWD: 1.06 CM
BH CV ECHO MEAS - MED PEAK E' VEL: 9 CM/SEC
BH CV ECHO MEAS - MV A DUR: 0.11 SEC
BH CV ECHO MEAS - MV A MAX VEL: 86.1 CM/SEC
BH CV ECHO MEAS - MV DEC SLOPE: 486.9 CM/SEC2
BH CV ECHO MEAS - MV DEC TIME: 224 SEC
BH CV ECHO MEAS - MV E MAX VEL: 78.4 CM/SEC
BH CV ECHO MEAS - MV E/A: 0.91
BH CV ECHO MEAS - MV MAX PG: 3.7 MMHG
BH CV ECHO MEAS - MV MEAN PG: 2.31 MMHG
BH CV ECHO MEAS - MV P1/2T: 60.4 MSEC
BH CV ECHO MEAS - MV V2 VTI: 20.7 CM
BH CV ECHO MEAS - MVA(P1/2T): 3.6 CM2
BH CV ECHO MEAS - MVA(VTI): 3.8 CM2
BH CV ECHO MEAS - PA ACC TIME: 0.12 SEC
BH CV ECHO MEAS - PA V2 MAX: 122.5 CM/SEC
BH CV ECHO MEAS - PULM A REVS DUR: 0.11 SEC
BH CV ECHO MEAS - PULM A REVS VEL: 22.7 CM/SEC
BH CV ECHO MEAS - PULM DIAS VEL: 45.5 CM/SEC
BH CV ECHO MEAS - PULM S/D: 1.28
BH CV ECHO MEAS - PULM SYS VEL: 58.1 CM/SEC
BH CV ECHO MEAS - QP/QS: 0.81
BH CV ECHO MEAS - RAP SYSTOLE: 3 MMHG
BH CV ECHO MEAS - RV MAX PG: 3 MMHG
BH CV ECHO MEAS - RV V1 MAX: 87 CM/SEC
BH CV ECHO MEAS - RV V1 VTI: 15.3 CM
BH CV ECHO MEAS - RVOT DIAM: 2.3 CM
BH CV ECHO MEAS - RVSP: 27.5 MMHG
BH CV ECHO MEAS - SV(LVOT): 78.5 ML
BH CV ECHO MEAS - SV(MOD-SP2): 90 ML
BH CV ECHO MEAS - SV(MOD-SP4): 67 ML
BH CV ECHO MEAS - SV(RVOT): 63.7 ML
BH CV ECHO MEAS - TAPSE (>1.6): 1.5 CM
BH CV ECHO MEAS - TR MAX PG: 24.5 MMHG
BH CV ECHO MEAS - TR MAX VEL: 247.6 CM/SEC
BH CV ECHO MEASUREMENTS AVERAGE E/E' RATIO: 9.01
BH CV VAS BP RIGHT ARM: NORMAL MMHG
BH CV XLRA - RV BASE: 3.4 CM
BH CV XLRA - RV LENGTH: 6.1 CM
BUN SERPL-MCNC: 13 MG/DL (ref 6–20)
BUN/CREAT SERPL: 11.2 (ref 7–25)
CALCIUM SPEC-SCNC: 8.8 MG/DL (ref 8.6–10.5)
CHLORIDE SERPL-SCNC: 103 MMOL/L (ref 98–107)
CO2 SERPL-SCNC: 28.1 MMOL/L (ref 22–29)
CREAT SERPL-MCNC: 1.16 MG/DL (ref 0.76–1.27)
D DIMER PPP FEU-MCNC: 2.53 MCGFEU/ML (ref 0–0.5)
D-LACTATE SERPL-SCNC: 1.4 MMOL/L (ref 0.5–2)
DEPRECATED RDW RBC AUTO: 37.9 FL (ref 37–54)
EGFRCR SERPLBLD CKD-EPI 2021: 83.7 ML/MIN/1.73
ERYTHROCYTE [DISTWIDTH] IN BLOOD BY AUTOMATED COUNT: 13.5 % (ref 12.3–15.4)
GEN 5 2HR TROPONIN T REFLEX: 9 NG/L
GGT SERPL-CCNC: 296 U/L (ref 8–61)
GLUCOSE BLDC GLUCOMTR-MCNC: 109 MG/DL (ref 70–130)
GLUCOSE BLDC GLUCOMTR-MCNC: 110 MG/DL (ref 70–130)
GLUCOSE BLDC GLUCOMTR-MCNC: 141 MG/DL (ref 70–130)
GLUCOSE BLDC GLUCOMTR-MCNC: 161 MG/DL (ref 70–130)
GLUCOSE SERPL-MCNC: 151 MG/DL (ref 65–99)
HCT VFR BLD AUTO: 43 % (ref 37.5–51)
HGB BLD-MCNC: 14.6 G/DL (ref 13–17.7)
LEFT ATRIUM VOLUME INDEX: 19.3 ML/M2
MCH RBC QN AUTO: 26.7 PG (ref 26.6–33)
MCHC RBC AUTO-ENTMCNC: 34 G/DL (ref 31.5–35.7)
MCV RBC AUTO: 78.8 FL (ref 79–97)
PLATELET # BLD AUTO: 323 10*3/MM3 (ref 140–450)
PMV BLD AUTO: 10.1 FL (ref 6–12)
POTASSIUM SERPL-SCNC: 3.7 MMOL/L (ref 3.5–5.2)
PROCALCITONIN SERPL-MCNC: 0.17 NG/ML (ref 0–0.25)
QT INTERVAL: 339 MS
QT INTERVAL: 350 MS
QTC INTERVAL: 401 MS
QTC INTERVAL: 438 MS
RBC # BLD AUTO: 5.46 10*6/MM3 (ref 4.14–5.8)
SINUS: 3.1 CM
SODIUM SERPL-SCNC: 140 MMOL/L (ref 136–145)
TROPONIN T DELTA: 0 NG/L
TROPONIN T SERPL HS-MCNC: 6 NG/L
WBC NRBC COR # BLD AUTO: 16.56 10*3/MM3 (ref 3.4–10.8)

## 2023-11-18 PROCEDURE — 99221 1ST HOSP IP/OBS SF/LOW 40: CPT | Performed by: NURSE PRACTITIONER

## 2023-11-18 PROCEDURE — 84145 PROCALCITONIN (PCT): CPT | Performed by: STUDENT IN AN ORGANIZED HEALTH CARE EDUCATION/TRAINING PROGRAM

## 2023-11-18 PROCEDURE — 93306 TTE W/DOPPLER COMPLETE: CPT | Performed by: INTERNAL MEDICINE

## 2023-11-18 PROCEDURE — 71275 CT ANGIOGRAPHY CHEST: CPT

## 2023-11-18 PROCEDURE — 85027 COMPLETE CBC AUTOMATED: CPT | Performed by: NURSE PRACTITIONER

## 2023-11-18 PROCEDURE — G0378 HOSPITAL OBSERVATION PER HR: HCPCS

## 2023-11-18 PROCEDURE — 25010000002 PROCHLORPERAZINE 10 MG/2ML SOLUTION: Performed by: EMERGENCY MEDICINE

## 2023-11-18 PROCEDURE — 80048 BASIC METABOLIC PNL TOTAL CA: CPT | Performed by: NURSE PRACTITIONER

## 2023-11-18 PROCEDURE — 85379 FIBRIN DEGRADATION QUANT: CPT | Performed by: STUDENT IN AN ORGANIZED HEALTH CARE EDUCATION/TRAINING PROGRAM

## 2023-11-18 PROCEDURE — 96376 TX/PRO/DX INJ SAME DRUG ADON: CPT

## 2023-11-18 PROCEDURE — 74177 CT ABD & PELVIS W/CONTRAST: CPT

## 2023-11-18 PROCEDURE — 25010000002 MORPHINE PER 10 MG: Performed by: EMERGENCY MEDICINE

## 2023-11-18 PROCEDURE — 93306 TTE W/DOPPLER COMPLETE: CPT

## 2023-11-18 PROCEDURE — 82948 REAGENT STRIP/BLOOD GLUCOSE: CPT

## 2023-11-18 PROCEDURE — 25510000001 IOPAMIDOL 61 % SOLUTION: Performed by: EMERGENCY MEDICINE

## 2023-11-18 PROCEDURE — 96375 TX/PRO/DX INJ NEW DRUG ADDON: CPT

## 2023-11-18 PROCEDURE — 93005 ELECTROCARDIOGRAM TRACING: CPT | Performed by: INTERNAL MEDICINE

## 2023-11-18 PROCEDURE — 63710000001 PREDNISONE PER 5 MG: Performed by: STUDENT IN AN ORGANIZED HEALTH CARE EDUCATION/TRAINING PROGRAM

## 2023-11-18 PROCEDURE — 36415 COLL VENOUS BLD VENIPUNCTURE: CPT | Performed by: NURSE PRACTITIONER

## 2023-11-18 PROCEDURE — 84484 ASSAY OF TROPONIN QUANT: CPT | Performed by: INTERNAL MEDICINE

## 2023-11-18 PROCEDURE — 63710000001 AZATHIOPRINE PER 50 MG: Performed by: STUDENT IN AN ORGANIZED HEALTH CARE EDUCATION/TRAINING PROGRAM

## 2023-11-18 PROCEDURE — 84484 ASSAY OF TROPONIN QUANT: CPT | Performed by: EMERGENCY MEDICINE

## 2023-11-18 PROCEDURE — 25010000002 DIPHENHYDRAMINE PER 50 MG: Performed by: NURSE PRACTITIONER

## 2023-11-18 PROCEDURE — 25510000001 IOPAMIDOL PER 1 ML: Performed by: STUDENT IN AN ORGANIZED HEALTH CARE EDUCATION/TRAINING PROGRAM

## 2023-11-18 PROCEDURE — 82977 ASSAY OF GGT: CPT | Performed by: INTERNAL MEDICINE

## 2023-11-18 PROCEDURE — 93010 ELECTROCARDIOGRAM REPORT: CPT | Performed by: INTERNAL MEDICINE

## 2023-11-18 PROCEDURE — 25510000001 PERFLUTREN (DEFINITY) 8.476 MG IN SODIUM CHLORIDE (PF) 0.9 % 10 ML INJECTION: Performed by: STUDENT IN AN ORGANIZED HEALTH CARE EDUCATION/TRAINING PROGRAM

## 2023-11-18 RX ORDER — ACETAMINOPHEN 325 MG/1
650 TABLET ORAL EVERY 4 HOURS PRN
Status: DISCONTINUED | OUTPATIENT
Start: 2023-11-18 | End: 2023-11-20 | Stop reason: HOSPADM

## 2023-11-18 RX ORDER — NICOTINE POLACRILEX 4 MG
15 LOZENGE BUCCAL
Status: DISCONTINUED | OUTPATIENT
Start: 2023-11-18 | End: 2023-11-20 | Stop reason: HOSPADM

## 2023-11-18 RX ORDER — DORZOLAMIDE HYDROCHLORIDE AND TIMOLOL MALEATE 20; 5 MG/ML; MG/ML
SOLUTION/ DROPS OPHTHALMIC 2 TIMES DAILY
Status: DISCONTINUED | OUTPATIENT
Start: 2023-11-18 | End: 2023-11-20 | Stop reason: HOSPADM

## 2023-11-18 RX ORDER — IBUPROFEN 600 MG/1
1 TABLET ORAL
Status: DISCONTINUED | OUTPATIENT
Start: 2023-11-18 | End: 2023-11-20 | Stop reason: HOSPADM

## 2023-11-18 RX ORDER — SODIUM CHLORIDE 0.9 % (FLUSH) 0.9 %
10 SYRINGE (ML) INJECTION EVERY 12 HOURS SCHEDULED
Status: DISCONTINUED | OUTPATIENT
Start: 2023-11-18 | End: 2023-11-20 | Stop reason: HOSPADM

## 2023-11-18 RX ORDER — PANTOPRAZOLE SODIUM 40 MG/10ML
40 INJECTION, POWDER, LYOPHILIZED, FOR SOLUTION INTRAVENOUS EVERY 12 HOURS SCHEDULED
Status: DISCONTINUED | OUTPATIENT
Start: 2023-11-18 | End: 2023-11-19

## 2023-11-18 RX ORDER — PREDNISOLONE ACETATE 10 MG/ML
1 SUSPENSION/ DROPS OPHTHALMIC DAILY
Status: DISCONTINUED | OUTPATIENT
Start: 2023-11-18 | End: 2023-11-20 | Stop reason: HOSPADM

## 2023-11-18 RX ORDER — DIPHENHYDRAMINE HYDROCHLORIDE 50 MG/ML
12.5 INJECTION INTRAMUSCULAR; INTRAVENOUS ONCE
Status: COMPLETED | OUTPATIENT
Start: 2023-11-18 | End: 2023-11-18

## 2023-11-18 RX ORDER — PREDNISONE 5 MG/1
5 TABLET ORAL
Status: DISCONTINUED | OUTPATIENT
Start: 2023-11-18 | End: 2023-11-20 | Stop reason: HOSPADM

## 2023-11-18 RX ORDER — INSULIN LISPRO 100 [IU]/ML
3-14 INJECTION, SOLUTION INTRAVENOUS; SUBCUTANEOUS
Status: DISCONTINUED | OUTPATIENT
Start: 2023-11-18 | End: 2023-11-20 | Stop reason: HOSPADM

## 2023-11-18 RX ORDER — ACETAMINOPHEN 650 MG/1
650 SUPPOSITORY RECTAL EVERY 4 HOURS PRN
Status: DISCONTINUED | OUTPATIENT
Start: 2023-11-18 | End: 2023-11-20 | Stop reason: HOSPADM

## 2023-11-18 RX ORDER — ROSUVASTATIN CALCIUM 40 MG/1
40 TABLET, COATED ORAL NIGHTLY
Status: DISCONTINUED | OUTPATIENT
Start: 2023-11-18 | End: 2023-11-20 | Stop reason: HOSPADM

## 2023-11-18 RX ORDER — PROCHLORPERAZINE EDISYLATE 5 MG/ML
10 INJECTION INTRAMUSCULAR; INTRAVENOUS ONCE
Status: COMPLETED | OUTPATIENT
Start: 2023-11-18 | End: 2023-11-18

## 2023-11-18 RX ORDER — SODIUM CHLORIDE 9 MG/ML
40 INJECTION, SOLUTION INTRAVENOUS AS NEEDED
Status: DISCONTINUED | OUTPATIENT
Start: 2023-11-18 | End: 2023-11-20 | Stop reason: HOSPADM

## 2023-11-18 RX ORDER — MORPHINE SULFATE 2 MG/ML
2 INJECTION, SOLUTION INTRAMUSCULAR; INTRAVENOUS ONCE
Status: COMPLETED | OUTPATIENT
Start: 2023-11-18 | End: 2023-11-18

## 2023-11-18 RX ORDER — SODIUM CHLORIDE 0.9 % (FLUSH) 0.9 %
10 SYRINGE (ML) INJECTION AS NEEDED
Status: DISCONTINUED | OUTPATIENT
Start: 2023-11-18 | End: 2023-11-20 | Stop reason: HOSPADM

## 2023-11-18 RX ORDER — AZATHIOPRINE 50 MG/1
50 TABLET ORAL 2 TIMES DAILY
Status: DISCONTINUED | OUTPATIENT
Start: 2023-11-18 | End: 2023-11-20 | Stop reason: HOSPADM

## 2023-11-18 RX ORDER — ACETAMINOPHEN 160 MG/5ML
650 SOLUTION ORAL EVERY 4 HOURS PRN
Status: DISCONTINUED | OUTPATIENT
Start: 2023-11-18 | End: 2023-11-20 | Stop reason: HOSPADM

## 2023-11-18 RX ORDER — CALCIUM CARBONATE 500 MG/1
2 TABLET, CHEWABLE ORAL 2 TIMES DAILY PRN
Status: DISCONTINUED | OUTPATIENT
Start: 2023-11-18 | End: 2023-11-20 | Stop reason: HOSPADM

## 2023-11-18 RX ORDER — LATANOPROST 50 UG/ML
1 SOLUTION/ DROPS OPHTHALMIC NIGHTLY
Status: DISCONTINUED | OUTPATIENT
Start: 2023-11-18 | End: 2023-11-20 | Stop reason: HOSPADM

## 2023-11-18 RX ORDER — AZATHIOPRINE 50 MG/1
50 TABLET ORAL 2 TIMES DAILY
COMMUNITY

## 2023-11-18 RX ORDER — ALUMINA, MAGNESIA, AND SIMETHICONE 2400; 2400; 240 MG/30ML; MG/30ML; MG/30ML
15 SUSPENSION ORAL ONCE
Status: DISCONTINUED | OUTPATIENT
Start: 2023-11-18 | End: 2023-11-20 | Stop reason: HOSPADM

## 2023-11-18 RX ORDER — LIDOCAINE HYDROCHLORIDE 20 MG/ML
5 SOLUTION OROPHARYNGEAL
Status: DISCONTINUED | OUTPATIENT
Start: 2023-11-18 | End: 2023-11-20 | Stop reason: HOSPADM

## 2023-11-18 RX ORDER — DEXTROSE MONOHYDRATE 25 G/50ML
25 INJECTION, SOLUTION INTRAVENOUS
Status: DISCONTINUED | OUTPATIENT
Start: 2023-11-18 | End: 2023-11-20 | Stop reason: HOSPADM

## 2023-11-18 RX ADMIN — ANTACID TABLETS 2 TABLET: 500 TABLET, CHEWABLE ORAL at 05:24

## 2023-11-18 RX ADMIN — Medication 10 ML: at 12:10

## 2023-11-18 RX ADMIN — TIMOLOL MALEATE: 5 SOLUTION OPHTHALMIC at 12:09

## 2023-11-18 RX ADMIN — ACETAMINOPHEN 650 MG: 325 TABLET ORAL at 05:24

## 2023-11-18 RX ADMIN — PREDNISONE 5 MG: 5 TABLET ORAL at 12:09

## 2023-11-18 RX ADMIN — PANTOPRAZOLE SODIUM 40 MG: 40 INJECTION, POWDER, FOR SOLUTION INTRAVENOUS at 21:58

## 2023-11-18 RX ADMIN — ROSUVASTATIN CALCIUM 40 MG: 40 TABLET, FILM COATED ORAL at 21:53

## 2023-11-18 RX ADMIN — AZATHIOPRINE 50 MG: 50 TABLET ORAL at 21:53

## 2023-11-18 RX ADMIN — AZATHIOPRINE 50 MG: 50 TABLET ORAL at 12:09

## 2023-11-18 RX ADMIN — Medication 10 ML: at 21:59

## 2023-11-18 RX ADMIN — IOPAMIDOL 85 ML: 612 INJECTION, SOLUTION INTRAVENOUS at 00:16

## 2023-11-18 RX ADMIN — PANTOPRAZOLE SODIUM 40 MG: 40 INJECTION, POWDER, FOR SOLUTION INTRAVENOUS at 12:09

## 2023-11-18 RX ADMIN — IOPAMIDOL 100 ML: 755 INJECTION, SOLUTION INTRAVENOUS at 10:50

## 2023-11-18 RX ADMIN — PERFLUTREN 2 ML: 6.52 INJECTION, SUSPENSION INTRAVENOUS at 14:51

## 2023-11-18 RX ADMIN — DIPHENHYDRAMINE HYDROCHLORIDE 12.5 MG: 50 INJECTION, SOLUTION INTRAMUSCULAR; INTRAVENOUS at 07:00

## 2023-11-18 RX ADMIN — MORPHINE SULFATE 2 MG: 2 INJECTION, SOLUTION INTRAMUSCULAR; INTRAVENOUS at 01:29

## 2023-11-18 RX ADMIN — PROCHLORPERAZINE EDISYLATE 10 MG: 5 INJECTION INTRAMUSCULAR; INTRAVENOUS at 01:30

## 2023-11-18 NOTE — ED TRIAGE NOTES
Pt c/o epigastric discomfort, belching and n/v that started Tuesday. Pt was released this am for fever

## 2023-11-18 NOTE — PLAN OF CARE
Problem: Adult Inpatient Plan of Care  Goal: Plan of Care Review  Outcome: Ongoing, Not Progressing  Flowsheets (Taken 11/18/2023 0629)  Progress: no change  Plan of Care Reviewed With:   patient   spouse  Outcome Evaluation: admit to [facility]. c.o epigastric chest pain. [facility] notified STAT EKG and troponin shortly after arrival to unit. Placed on 2L O2. GI Consult called spoke with Dr. Witt and team to see patient today. tachypnea. hiccups and chest pain that induce him to clench his chest and lean forward intermittently pairing with nausea. tylenol and tums given. coold compress to upper lip edema encouraged. Patient requesting Benadryl. Patient was concerned blood glucose was high, upon POC check it was 140, he is diabetic and expresses some insurance issues with medication. continuous cardiac monitor in place.  Goal: Patient-Specific Goal (Individualized)  Outcome: Ongoing, Not Progressing  Goal: Absence of Hospital-Acquired Illness or Injury  Outcome: Ongoing, Not Progressing  Intervention: Prevent Skin Injury  Recent Flowsheet Documentation  Taken 11/18/2023 0611 by Perri Valderrama RN  Body Position: position changed independently  Intervention: Prevent and Manage VTE (Venous Thromboembolism) Risk  Recent Flowsheet Documentation  Taken 11/18/2023 0611 by Perri Valderrama RN  Activity Management: up ad lin  Goal: Optimal Comfort and Wellbeing  Outcome: Ongoing, Not Progressing  Intervention: Monitor Pain and Promote Comfort  Recent Flowsheet Documentation  Taken 11/18/2023 0611 by Perri Valderrama, RN  Pain Management Interventions:   care clustered   cold applied   pain management plan reviewed with patient/caregiver   pillow support provided   position adjusted   quiet environment facilitated   see MAR   unnecessary movement minimized  Goal: Readiness for Transition of Care  Outcome: Ongoing, Not Progressing   Goal Outcome Evaluation:  Plan of Care Reviewed With: patient, spouse        Progress: no change  Outcome  Evaluation: admit to [facility]. c.o epigastric chest pain. [facility] notified STAT EKG and troponin shortly after arrival to unit. Placed on 2L O2. GI Consult called spoke with Dr. Witt and team to see patient today. tachypnea. hiccups and chest pain that induce him to clench his chest and lean forward intermittently pairing with nausea. tylenol and tums given. coold compress to upper lip edema encouraged. Patient requesting Benadryl. Patient was concerned blood glucose was high, upon POC check it was 140, he is diabetic and expresses some insurance issues with medication. continuous cardiac monitor in place.

## 2023-11-18 NOTE — H&P
Patient Name:  Josiah Loomis  YOB: 1985  MRN:  [id number]  Admit Date:  11/17/2023  Patient Care Team:  Kodi Rosario DO as PCP - General  Kodi Rosario DO as PCP - Family Medicine  Claude Gamboa MD (Ophthalmology)  System, Provider Not In (Gastroenterology)      Subjective   History Present Illness     Chief Complaint   Patient presents with    Abdominal Pain       Mr. Loomis is a 38 y.o. 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 He initially present to the ED with GI complains on 11/14/23 and was subsequently discharged after receiving zofran and a GI cocktail. He then returned on 11/15/23 with swelling in his lower lip, tingling/soreness in his throat, urticaria, and fever to 101.5. infectious workup was negative and fevers (as well as his other symptoms) resolved after a single dose of rocephin and iv steroids and so he was discharged yesterday. He was felt to have had an allergic reaction to zofran. He represents today with epigastric and chest discomfort similar to his initial presentation on the 14th. He also endoreses hiccupping, nausea, and vomiting. He states that the pain is sharp and non-radiating. Food doesn't appear to modify it. He did not get relief from the GI cocktail. No history of DVT or PE, but does endorse immobility due to recently being in the hospital as well as being desk bound at work.    History of Present Illness  Review of Systems   Constitutional:  Negative for chills, fatigue and fever.   HENT:  Negative for postnasal drip and rhinorrhea.    Eyes: Negative.    Respiratory:  Positive for shortness of breath. Negative for cough.    Cardiovascular:  Positive for chest pain. Negative for palpitations and leg swelling.   Gastrointestinal:  Positive for abdominal pain, nausea and vomiting. Negative for  diarrhea.   Endocrine: Negative.    Genitourinary:  Negative for dysuria, flank pain, frequency and urgency.   Musculoskeletal:  Negative for arthralgias and myalgias.   Skin:  Negative for rash and wound.   Allergic/Immunologic: Negative.    Neurological:  Negative for light-headedness and headaches.   Hematological: Negative.    Psychiatric/Behavioral:  Negative for confusion and decreased concentration.         Personal History     Past Medical History:   Diagnosis Date    GERD (gastroesophageal reflux disease)     Hyperlipidemia     Hypertension      History reviewed. No pertinent surgical history.  Family History   Problem Relation Age of Onset    Crohn's disease Father     Diabetes Maternal Grandmother     Diabetes Paternal Grandmother      Social History     Tobacco Use    Smoking status: Never    Smokeless tobacco: Never   Vaping Use    Vaping Use: Some days    Substances: THC    Devices: Pre-filled or refillable cartridge   Substance Use Topics    Alcohol use: Yes     Comment: rarely    Drug use: Yes     Frequency: 1.0 times per week     Types: Marijuana     Comment: smokes daily per pt     Current Facility-Administered Medications on File Prior to Encounter   Medication Dose Route Frequency Provider Last Rate Last Admin    [DISCONTINUED] acetaminophen (TYLENOL) tablet 650 mg  650 mg Oral Q4H PRN Margaux Garcia MD   650 mg at 11/15/23 2012    [DISCONTINUED] albuterol (PROVENTIL) nebulizer solution 0.083% 2.5 mg/3mL  2.5 mg Nebulization Q6H PRN Margaux Garcia MD        [DISCONTINUED] bisacodyl (DULCOLAX) EC tablet 5 mg  5 mg Oral Daily PRN Margaux Garcia MD        [DISCONTINUED] bisacodyl (DULCOLAX) suppository 10 mg  10 mg Rectal Daily PRN Margaux Garcia MD        [DISCONTINUED] dextrose (D50W) (25 g/50 mL) IV injection 25 g  25 g Intravenous Q15 Min PRN Margaux Garcia MD        [DISCONTINUED] dextrose (GLUTOSE) oral gel 15 g  15 g Oral Q15 Min PRN Margaux Garcia  MD Dejan        [DISCONTINUED] diphenhydrAMINE (BENADRYL) capsule 50 mg  50 mg Oral Q6H PRN Tu Burgess MD   50 mg at 11/17/23 0626    [DISCONTINUED] famotidine (PEPCID) injection 20 mg  20 mg Intravenous Q12H Margaux Garcia MD   20 mg at 11/17/23 0910    [DISCONTINUED] fluorometholone (FML) 0.1 % ophthalmic suspension 1 drop  1 drop Both Eyes TID Margaux Garcia MD   1 drop at 11/17/23 0910    [DISCONTINUED] glucagon (GLUCAGEN) injection 1 mg  1 mg Intramuscular Q15 Min PRN Margaux Garcia MD        [DISCONTINUED] insulin glargine (LANTUS, SEMGLEE) injection 11 Units  11 Units Subcutaneous Nightly Margaux Garcia MD   11 Units at 11/16/23 2208    [DISCONTINUED] insulin lispro (HUMALOG/ADMELOG) injection 2-9 Units  2-9 Units Subcutaneous 4x Daily AC & at Bedtime Margaux Garcia MD   4 Units at 11/17/23 0910    [DISCONTINUED] melatonin tablet 3 mg  3 mg Oral Nightly PRN Margaux Garcia MD        [DISCONTINUED] polyethylene glycol (MIRALAX) packet 17 g  17 g Oral Daily PRN Margaux Garcia MD        [DISCONTINUED] prochlorperazine (COMPAZINE) injection 10 mg  10 mg Intravenous Q6H PRN Margaux Garcia MD   10 mg at 11/16/23 0215    [DISCONTINUED] rosuvastatin (CRESTOR) tablet 40 mg  40 mg Oral Nightly Margaux Garcia MD   40 mg at 11/16/23 2114    [DISCONTINUED] sennosides-docusate (PERICOLACE) 8.6-50 MG per tablet 2 tablet  2 tablet Oral BID Margaux Garcia MD   2 tablet at 11/16/23 2114     Current Outpatient Medications on File Prior to Encounter   Medication Sig Dispense Refill    Adalimumab (Humira) 40 MG/0.4ML Prefilled Syringe Kit injection Inject 0.4 mL under the skin into the appropriate area as directed 1 (One) Time.      azaTHIOprine (IMURAN) 50 MG tablet Take 1 tablet by mouth 2 (Two) Times a Day.      diphenhydrAMINE (BENADRYL) 50 MG capsule Take 1 capsule by mouth Every 6 (Six) Hours As Needed for Itching.      famotidine (PEPCID)  40 MG tablet TAKE 1 TABLET BY MOUTH ONCE DAILY AT NIGHT 90 tablet 0    insulin aspart (NovoLOG FlexPen ReliOn) 100 UNIT/ML solution pen-injector sc pen INJECT UP TO 36 UNITS DAILY VIA SLIDING SCALE(3U)150-200, 6U(201-250), 9U(251-300), 12 USE(301-351), 33 mL 3    ondansetron (ZOFRAN) 4 MG tablet Take 1-2 tablets by mouth.      prednisoLONE acetate (PRED FORTE) 1 % ophthalmic suspension Administer 1 drop to both eyes Daily.      predniSONE (DELTASONE) 5 MG tablet TAKE 6 TABLETS BY MOUTH 1 TIME DAILY FOR 2 WEEKS. THEN TAKE 4 TABLETS BY MOUTH DAILY FOR 4 WEEKS.      rosuvastatin (CRESTOR) 40 MG tablet TAKE 1 TABLET BY MOUTH ONCE DAILY AT NIGHT 90 tablet 0    Semaglutide, 1 MG/DOSE, (OZEMPIC) 4 MG/3ML solution pen-injector Inject 1 mg under the skin into the appropriate area as directed 1 (One) Time Per Week. 3 mL 5    TIMOLOL-DORZOLAMID-LATANOPROST OP Apply  to eye(s) as directed by provider.      Cholecalciferol (vitamin D3) 125 MCG (5000 UT) tablet tablet Take 1 tablet by mouth Daily. 90 tablet 1    Continuous Blood Gluc Sensor (FreeStyle Checo 3 Sensor) misc 1 each Every 14 (Fourteen) Days. 6 each 3    glucose blood (Accu-Chek Guide) test strip 1 each by Other route 4 (Four) Times a Day. Use as instructed. Dx. E11.9 400 each 12    insulin degludec (Tresiba FlexTouch) 100 UNIT/ML solution pen-injector injection Inject 11 Units under the skin into the appropriate area as directed Every Night. 15 mL 5    Insulin Pen Needle (BD Pen Needle Dory 2nd Gen) 32G X 4 MM misc USE AS DIRECTED FOUR TIMES DAILY 400 each 5    Lancets 30G misc 1 Device 4 (Four) Times a Day. Dx. E11.9 400 each 12     Allergies   Allergen Reactions    Zofran [Ondansetron] Itching     IV form only       Objective    Objective     Vital Signs  Temp:  [97.9 °F (36.6 °C)-98.7 °F (37.1 °C)] 97.9 °F (36.6 °C)  Heart Rate:  [] 112  Resp:  [18-30] 30  BP: (144-177)/() 144/102  SpO2:  [93 %-100 %] 93 %  on  Flow (L/min):  [2] 2;   Device (Oxygen  Therapy): nasal cannula  Body mass index is 47.74 kg/m².    Physical Exam  Vitals and nursing note reviewed.   Constitutional:       General: He is not in acute distress.     Appearance: He is obese. He is not toxic-appearing.   HENT:      Head: Normocephalic and atraumatic.      Mouth/Throat:      Mouth: Mucous membranes are moist.      Pharynx: Oropharynx is clear. No oropharyngeal exudate.   Eyes:      Extraocular Movements: Extraocular movements intact.      Conjunctiva/sclera: Conjunctivae normal.      Pupils: Pupils are equal, round, and reactive to light.   Cardiovascular:      Rate and Rhythm: Regular rhythm. Tachycardia present.      Heart sounds: Normal heart sounds.   Pulmonary:      Effort: Pulmonary effort is normal. No respiratory distress.      Breath sounds: Normal breath sounds. No wheezing, rhonchi or rales.   Abdominal:      General: Bowel sounds are normal. There is no distension.      Palpations: Abdomen is soft.      Tenderness: There is no abdominal tenderness. There is no guarding or rebound.   Musculoskeletal:         General: No tenderness.      Cervical back: Normal range of motion and neck supple.      Right lower leg: No edema.      Left lower leg: No edema.   Skin:     General: Skin is warm and dry.      Findings: No erythema or rash.   Neurological:      General: No focal deficit present.      Mental Status: He is alert and oriented to person, place, and time.   Psychiatric:         Mood and Affect: Mood normal.         Behavior: Behavior normal.         Results Review:  I reviewed the patient's new clinical results.  I reviewed the patient's new imaging results and agree with the interpretation.  I reviewed the patient's other test results and agree with the interpretation  I personally viewed and interpreted the patient's EKG/Telemetry data  Discussed with ED provider.    Lab Results (last 24 hours)       Procedure Component Value Units Date/Time    CBC & Differential [[id number]]   (Abnormal) Collected: 11/17/23 2323    Specimen: Blood Updated: 11/17/23 2339    Narrative:      The following orders were created for panel order CBC & Differential.  Procedure                               Abnormality         Status                     ---------                               -----------         ------                     CBC Auto Differential[[id number]]        Abnormal            Final result                 Please view results for these tests on the individual orders.    Comprehensive Metabolic Panel [[id number]]  (Abnormal) Collected: 11/17/23 2323    Specimen: Blood Updated: 11/17/23 2359     Glucose 100 mg/dL      BUN 15 mg/dL      Creatinine 1.06 mg/dL      Sodium 138 mmol/L      Potassium 4.0 mmol/L      Chloride 104 mmol/L      CO2 25.0 mmol/L      Calcium 9.2 mg/dL      Total Protein 6.5 g/dL      Albumin 3.5 g/dL      ALT (SGPT) 33 U/L      AST (SGOT) 18 U/L      Alkaline Phosphatase 118 U/L      Total Bilirubin 0.4 mg/dL      Globulin 3.0 gm/dL      A/G Ratio 1.2 g/dL      BUN/Creatinine Ratio 14.2     Anion Gap 9.0 mmol/L      eGFR 93.3 mL/min/1.73     Narrative:      GFR Normal >60  Chronic Kidney Disease <60  Kidney Failure <15      Protime-INR [[id number]]  (Normal) Collected: 11/17/23 2323    Specimen: Blood Updated: 11/17/23 2341     Protime 13.8 Seconds      INR 1.05    aPTT [[id number]]  (Normal) Collected: 11/17/23 2323    Specimen: Blood Updated: 11/17/23 2341     PTT 22.7 seconds     High Sensitivity Troponin T [[id number]]  (Normal) Collected: 11/17/23 2323    Specimen: Blood Updated: 11/17/23 2353     HS Troponin T 9 ng/L     Narrative:      High Sensitive Troponin T Reference Range:  <14.0 ng/L- Negative Female for AMI  <22.0 ng/L- Negative Male for AMI  >=14 - Abnormal Female indicating possible myocardial injury.  >=22 - Abnormal Male indicating possible myocardial injury.   Clinicians would have to utilize clinical acumen, EKG, Troponin, and serial changes to determine if it  is an Acute Myocardial Infarction or myocardial injury due to an underlying chronic condition.         BNP [[id number]]  (Normal) Collected: 11/17/23 2323    Specimen: Blood Updated: 11/17/23 2351     proBNP <36.0 pg/mL     Narrative:      This assay is used as an aid in the diagnosis of individuals suspected of having heart failure. It can be used as an aid in the diagnosis of acute decompensated heart failure (ADHF) in patients presenting with signs and symptoms of ADHF to the emergency department (ED). In addition, NT-proBNP of <300 pg/mL indicates ADHF is not likely.    Age Range Result Interpretation  NT-proBNP Concentration (pg/mL:      <50             Positive            >450                   Gray                 300-450                    Negative             <300    50-75           Positive            >900                  Gray                300-900                  Negative            <300      >75             Positive            >1800                  Gray                300-1800                  Negative            <300    Lipase [[id number]]  (Normal) Collected: 11/17/23 2323    Specimen: Blood Updated: 11/17/23 2353     Lipase 59 U/L     Lactic Acid, Plasma [[id number]]  (Normal) Collected: 11/17/23 2323    Specimen: Blood Updated: 11/18/23 0013     Lactate 1.4 mmol/L     CBC Auto Differential [[id number]]  (Abnormal) Collected: 11/17/23 2323    Specimen: Blood Updated: 11/17/23 2339     WBC 22.63 10*3/mm3      RBC 5.76 10*6/mm3      Hemoglobin 15.2 g/dL      Hematocrit 45.2 %      MCV 78.5 fL      MCH 26.4 pg      MCHC 33.6 g/dL      RDW 13.4 %      RDW-SD 37.7 fl      MPV 10.0 fL      Platelets 370 10*3/mm3      Neutrophil % 82.2 %      Lymphocyte % 13.4 %      Monocyte % 3.4 %      Eosinophil % 0.2 %      Basophil % 0.1 %      Immature Grans % 0.7 %      Neutrophils, Absolute 18.58 10*3/mm3      Lymphocytes, Absolute 3.03 10*3/mm3      Monocytes, Absolute 0.78 10*3/mm3      Eosinophils, Absolute 0.05  10*3/mm3      Basophils, Absolute 0.03 10*3/mm3      Immature Grans, Absolute 0.16 10*3/mm3      nRBC 0.0 /100 WBC     High Sensitivity Troponin T 2Hr [[id number]]  (Normal) Collected: 11/18/23 0132    Specimen: Blood Updated: 11/18/23 0156     HS Troponin T 9 ng/L      Troponin T Delta 0 ng/L     Narrative:      High Sensitive Troponin T Reference Range:  <14.0 ng/L- Negative Female for AMI  <22.0 ng/L- Negative Male for AMI  >=14 - Abnormal Female indicating possible myocardial injury.  >=22 - Abnormal Male indicating possible myocardial injury.   Clinicians would have to utilize clinical acumen, EKG, Troponin, and serial changes to determine if it is an Acute Myocardial Infarction or myocardial injury due to an underlying chronic condition.         POC Glucose Once [[id number]]  (Abnormal) Collected: 11/18/23 0540    Specimen: Blood Updated: 11/18/23 0541     Glucose 141 mg/dL     Basic Metabolic Panel [[id number]]  (Abnormal) Collected: 11/18/23 0557    Specimen: Blood Updated: 11/18/23 0655     Glucose 151 mg/dL      BUN 13 mg/dL      Creatinine 1.16 mg/dL      Sodium 140 mmol/L      Potassium 3.7 mmol/L      Chloride 103 mmol/L      CO2 28.1 mmol/L      Calcium 8.8 mg/dL      BUN/Creatinine Ratio 11.2     Anion Gap 8.9 mmol/L      eGFR 83.7 mL/min/1.73     Narrative:      GFR Normal >60  Chronic Kidney Disease <60  Kidney Failure <15      CBC (No Diff) [[id number]]  (Abnormal) Collected: 11/18/23 0557    Specimen: Blood Updated: 11/18/23 0639     WBC 16.56 10*3/mm3      RBC 5.46 10*6/mm3      Hemoglobin 14.6 g/dL      Hematocrit 43.0 %      MCV 78.8 fL      MCH 26.7 pg      MCHC 34.0 g/dL      RDW 13.5 %      RDW-SD 37.9 fl      MPV 10.1 fL      Platelets 323 10*3/mm3     High Sensitivity Troponin T [[id number]]  (Normal) Collected: 11/18/23 0557    Specimen: Blood Updated: 11/18/23 0655     HS Troponin T 6 ng/L     Narrative:      High Sensitive Troponin T Reference Range:  <14.0 ng/L- Negative Female for  AMI  <22.0 ng/L- Negative Male for AMI  >=14 - Abnormal Female indicating possible myocardial injury.  >=22 - Abnormal Male indicating possible myocardial injury.   Clinicians would have to utilize clinical acumen, EKG, Troponin, and serial changes to determine if it is an Acute Myocardial Infarction or myocardial injury due to an underlying chronic condition.                 Imaging Results (Last 24 Hours)       Procedure Component Value Units Date/Time    CT Abdomen Pelvis With Contrast [[id number]] Collected: 11/18/23 0354     Updated: 11/18/23 0354    Narrative:        Patient: NORA STEWART  Time Out: 03:54  Exam(s): CT ABDOMEN + PELVIS With Contrast     EXAM:    CT Abdomen and Pelvis With Intravenous Contrast    CLINICAL HISTORY:     Reason for exam: abd pain.    TECHNIQUE:    Axial computed tomography images of the abdomen and pelvis with   intravenous contrast.  CTDI is 42.17 mGy and DLP is 2291 mGy-cm.  This CT   exam was performed according to the principle of ALARA (As Low As   Reasonably Achievable) by using one or more of the following dose   reduction techniques: automated exposure control, adjustment of the mA   and or kV according to patient size, and or use of iterative   reconstruction technique.    COMPARISON:    No relevant prior studies available.    FINDINGS:    Lung bases:  Unremarkable.  No mass.  No consolidation.     ABDOMEN:    Liver:  Unremarkable.  No mass.    Gallbladder and bile ducts:  Punctate cholelithiasis without   gallbladder distention.  No ductal dilation.    Pancreas:  Unremarkable.  No mass.  No ductal dilation.    Spleen:  Unremarkable.  No splenomegaly.    Adrenals:  Unremarkable.  No mass.    Kidneys and ureters:  Unremarkable.  No solid mass.  No hydronephrosis.    Stomach and bowel:  Unremarkable.  No obstruction.  No mucosal   thickening.     PELVIS:    Appendix:  No findings to suggest acute appendicitis.    Bladder:  Unremarkable.  No mass.    Reproductive:   Unremarkable as visualized.     ABDOMEN and PELVIS:    Intraperitoneal space:  Unremarkable.  No free air.  No significant   fluid collection.    Bones joints:  No acute fracture.  No dislocation.    Soft tissues:  Unremarkable.    Vasculature:  Unremarkable.  No abdominal aortic aneurysm.    Lymph nodes:  Unremarkable.  No enlarged lymph nodes.    IMPRESSION:         No acute findings in the abdomen or pelvis.      Impression:          Electronically signed by Wilman Dang MD on 11-18-23 at 0354    XR Chest 1 View [[id number]] Collected: 11/17/23 2338     Updated: 11/17/23 2343    Narrative:      XR CHEST 1 VW-     Clinical: Chest pain, epigastric discomfort     COMPARISON examination 11/15/2023     FINDINGS: Cardiac size is within normal limits. No mediastinal or hilar  abnormality. The lungs are clear. There is a lucency demonstrated under  the left hemidiaphragm which probably represents gastric bubble. It is  more curvilinear than typically seen (worrisome for free air) and given  patient's symptoms, I would represent left side down decubitus view of  the abdomen as follow-up.     This report was finalized on 11/17/2023 11:40 PM by Dr. Louie Billings M.D on Workstation: [id number]                   ECG 12 Lead Chest Pain   Preliminary Result   HEART RATE= 94  bpm   RR Interval= 638  ms   AK Interval= 134  ms   P Horizontal Axis= -9  deg   P Front Axis= 56  deg   QRSD Interval= 91  ms   QT Interval= 350  ms   QTcB= 438  ms   QRS Axis= 2  deg   T Wave Axis= 25  deg   - ABNORMAL ECG -   Sinus rhythm   Left ventricular hypertrophy   Electronically Signed By:    Date and Time of Study: 2023-11-18 05:47:48      ECG 12 Lead Chest Pain   Preliminary Result   HEART RATE= 84  bpm   RR Interval= 714  ms   AK Interval= 140  ms   P Horizontal Axis= 10  deg   P Front Axis= 29  deg   QRSD Interval= 104  ms   QT Interval= 339  ms   QTcB= 401  ms   QRS Axis= 15  deg   T Wave Axis= 28  deg   - OTHERWISE NORMAL ECG -   Sinus  rhythm   RSR' in V1 or V2, right VCD or RVH   Minimal ST elevation, anterior leads   Baseline wander in lead(s) II,III,aVR,aVF   Electronically Signed By:    Date and Time of Study: 2023-11-17 23:14:59      SCANNED - TELEMETRY     Final Result           Assessment/Plan     Active Hospital Problems    Diagnosis  POA    **Epigastric pain [R10.13]  Yes    Gastroesophageal reflux disease [K21.9]  Yes    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       Mr. Loomis is a 38 y.o. 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 He initially present to the ED with GI complains on 11/14/23 and was subsequently discharged after receiving zofran and a GI cocktail. He then returned on 11/15/23 with swelling in his lower lip, tingling/soreness in his throat, urticaria, and fever to 101.5. infectious workup was negative and fevers (as well as his other symptoms) resolved after a single dose of rocephin and iv steroids and so he was discharged yesterday. He was felt to have had an allergic reaction to zofran. He represents today with epigastric and chest discomfort similar to his initial presentation on the 14th as well as nausea and vomiting and hiccups.    Chest pain-trop negative x2 and bnp negative. He's tachycardic and moderate risk for a pulmonary embolism, so will check a CTA chest. Overall his symptoms sound more consistent with GI symptoms and so has been started on IV ppi BID and will ask GI to see.   Leukocytosis-likely secondary to steroids received during prior hospitalization. Downtrending and  afebrile without source of infection.  Restart home medications as appropriate  I discussed the patient's findings and my recommendations with patient, family, and nursing staff.    VTE Prophylaxis - SCDs.  Code Status - Full code.       Mauricio Degroot MD  [facility]  11/18/23  06:58 EST

## 2023-11-18 NOTE — ED PROVIDER NOTES
EMERGENCY DEPARTMENT ENCOUNTER    Room Number:  S406/1  PCP: Kodi Rosario DO  Patient Care Team:  Kodi Rosario DO as PCP - General  Kodi Rosario DO as PCP - Family Medicine  Claude Gamboa MD (Ophthalmology)  System, Provider Not In (Gastroenterology)   Independent Historians: Patient    HPI:  Chief Complaint: Chest pain    A complete HPI/ROS/PMH/PSH/SH/FH are unobtainable due to: None    Chronic or social conditions impacting patient care (Social Determinants of Health): None  (Financial Resource Strain / Food Insecurity / Transportation Needs / Physical Activity / Stress / Social Connections / Intimate Partner Violence / Housing Stability)    Context: Josiah Loomis is a 38 y.o. male who presents to the ED c/o acute pain attributes from his epigastrium to his chest.  Started around 9 PM.  Had similar chest pain on Tuesday.  Reports a burning sour taste in his mouth states that the pain is intermittent but rapidly switches from present to absent patient was previously worked up in the ED were covered with Pepcid and GI cocktail.  Patient then returned to the ED with some angioedema secondary to Zofran.  Was admitted and worked up had some transient fevers.  Was started on steroids.    Review of prior external notes (non-ED) -and- Review of prior external test results outside of this encounter: I reviewed patient's discharge summary from today    Prescription drug monitoring program review:         PAST MEDICAL HISTORY  Active Ambulatory Problems     Diagnosis Date Noted    Sprain of acromioclavicular ligament 05/26/2016    Atopic dermatitis 05/26/2016    Essential hypertension 05/26/2016    Dyslipidemia 05/26/2016    Abdominal hernia 05/26/2016    Morbidly obese 11/16/2018    Type 2 diabetes mellitus without complication, with long-term current use of insulin 08/22/2019    Hyperlipidemia 08/20/2020    Abnormal liver function tests 03/11/2021    Autoimmune hepatitis 04/01/2022    Cataract extraction  status of left eye 12/03/2021    Cataract of right eye 04/01/2022    Cystoid macular edema 03/08/2021    Diabetes mellitus 12/03/2020    Disorder due to insertion of glaucoma drainage device 10/08/2021    Gastroesophageal reflux disease 03/11/2021    High gamma glutamyl transferase (GGT) 12/03/2020    Other specified postprocedural states 03/11/2021    Other states following surgery of eye and adnexa 09/22/2021    Pseudophakia of left eye 03/05/2021    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 No pertinent surgical history.      FAMILY HISTORY  Family History   Problem Relation Age of Onset    Crohn's disease Father     Diabetes Maternal Grandmother     Diabetes Paternal Grandmother          SOCIAL HISTORY  Social History     Socioeconomic History    Marital status:    Tobacco Use    Smoking status: Never    Smokeless tobacco: Never   Vaping Use    Vaping Use: Some days    Substances: THC    Devices: Pre-filled or refillable cartridge   Substance and Sexual Activity    Alcohol use: Yes     Comment: rarely    Drug use: Yes     Frequency: 1.0 times per week     Types: Marijuana     Comment: smokes daily per pt    Sexual activity: Defer         ALLERGIES  Zofran [ondansetron]        REVIEW OF SYSTEMS  Review of Systems  Included in HPI  All systems reviewed and negative except for those discussed in HPI.      PHYSICAL EXAM    I have reviewed the triage vital signs and nursing notes.    ED Triage Vitals   Temp Heart Rate Resp BP SpO2   11/17/23 2255 11/17/23 2255 11/17/23 2255 11/17/23 2300  11/17/23 2255   98.7 °F (37.1 °C) 100 18 (!) 177/120 98 %      Temp src Heart Rate Source Patient Position BP Location FiO2 (%)   11/17/23 2255 11/17/23 2255 -- -- --   Tympanic Monitor          Physical Exam  GENERAL: alert, mild acute distress  SKIN: Warm, dry  HENT: Normocephalic, atraumatic  EYES: no scleral icterus  CV: regular rhythm, regular rate  RESPIRATORY: normal effort, lungs clear  ABDOMEN: soft, nontender, nondistended  MUSCULOSKELETAL: no deformity  NEURO: alert, moves all extremities, follows commands                                                               LAB RESULTS  Recent Results (from the past 24 hour(s))   ECG 12 Lead Chest Pain    Collection Time: 11/17/23 11:14 PM   Result Value Ref Range    QT Interval 339 ms    QTC Interval 401 ms   Comprehensive Metabolic Panel    Collection Time: 11/17/23 11:23 PM    Specimen: Blood   Result Value Ref Range    Glucose 100 (H) 65 - 99 mg/dL    BUN 15 6 - 20 mg/dL    Creatinine 1.06 0.76 - 1.27 mg/dL    Sodium 138 136 - 145 mmol/L    Potassium 4.0 3.5 - 5.2 mmol/L    Chloride 104 98 - 107 mmol/L    CO2 25.0 22.0 - 29.0 mmol/L    Calcium 9.2 8.6 - 10.5 mg/dL    Total Protein 6.5 6.0 - 8.5 g/dL    Albumin 3.5 3.5 - 5.2 g/dL    ALT (SGPT) 33 1 - 41 U/L    AST (SGOT) 18 1 - 40 U/L    Alkaline Phosphatase 118 (H) 39 - 117 U/L    Total Bilirubin 0.4 0.0 - 1.2 mg/dL    Globulin 3.0 gm/dL    A/G Ratio 1.2 g/dL    BUN/Creatinine Ratio 14.2 7.0 - 25.0    Anion Gap 9.0 5.0 - 15.0 mmol/L    eGFR 93.3 >60.0 mL/min/1.73   Protime-INR    Collection Time: 11/17/23 11:23 PM    Specimen: Blood   Result Value Ref Range    Protime 13.8 11.7 - 14.2 Seconds    INR 1.05 0.90 - 1.10   aPTT    Collection Time: 11/17/23 11:23 PM    Specimen: Blood   Result Value Ref Range    PTT 22.7 22.7 - 35.4 seconds   High Sensitivity Troponin T    Collection Time: 11/17/23 11:23 PM    Specimen: Blood   Result Value Ref Range    HS Troponin T 9 <22 ng/L   BNP    Collection Time: 11/17/23  11:23 PM    Specimen: Blood   Result Value Ref Range    proBNP <36.0 0.0 - 450.0 pg/mL   Lipase    Collection Time: 11/17/23 11:23 PM    Specimen: Blood   Result Value Ref Range    Lipase 59 13 - 60 U/L   Lactic Acid, Plasma    Collection Time: 11/17/23 11:23 PM    Specimen: Blood   Result Value Ref Range    Lactate 1.4 0.5 - 2.0 mmol/L   CBC Auto Differential    Collection Time: 11/17/23 11:23 PM    Specimen: Blood   Result Value Ref Range    WBC 22.63 (H) 3.40 - 10.80 10*3/mm3    RBC 5.76 4.14 - 5.80 10*6/mm3    Hemoglobin 15.2 13.0 - 17.7 g/dL    Hematocrit 45.2 37.5 - 51.0 %    MCV 78.5 (L) 79.0 - 97.0 fL    MCH 26.4 (L) 26.6 - 33.0 pg    MCHC 33.6 31.5 - 35.7 g/dL    RDW 13.4 12.3 - 15.4 %    RDW-SD 37.7 37.0 - 54.0 fl    MPV 10.0 6.0 - 12.0 fL    Platelets 370 140 - 450 10*3/mm3    Neutrophil % 82.2 (H) 42.7 - 76.0 %    Lymphocyte % 13.4 (L) 19.6 - 45.3 %    Monocyte % 3.4 (L) 5.0 - 12.0 %    Eosinophil % 0.2 (L) 0.3 - 6.2 %    Basophil % 0.1 0.0 - 1.5 %    Immature Grans % 0.7 (H) 0.0 - 0.5 %    Neutrophils, Absolute 18.58 (H) 1.70 - 7.00 10*3/mm3    Lymphocytes, Absolute 3.03 0.70 - 3.10 10*3/mm3    Monocytes, Absolute 0.78 0.10 - 0.90 10*3/mm3    Eosinophils, Absolute 0.05 0.00 - 0.40 10*3/mm3    Basophils, Absolute 0.03 0.00 - 0.20 10*3/mm3    Immature Grans, Absolute 0.16 (H) 0.00 - 0.05 10*3/mm3    nRBC 0.0 0.0 - 0.2 /100 WBC   High Sensitivity Troponin T 2Hr    Collection Time: 11/18/23  1:32 AM    Specimen: Blood   Result Value Ref Range    HS Troponin T 9 <22 ng/L    Troponin T Delta 0 >=-4 - <+4 ng/L   POC Glucose Once    Collection Time: 11/18/23  5:40 AM    Specimen: Blood   Result Value Ref Range    Glucose 141 (H) 70 - 130 mg/dL   ECG 12 Lead Chest Pain    Collection Time: 11/18/23  5:47 AM   Result Value Ref Range    QT Interval 350 ms    QTC Interval 438 ms   CBC (No Diff)    Collection Time: 11/18/23  5:57 AM    Specimen: Blood   Result Value Ref Range    WBC 16.56 (H) 3.40 - 10.80 10*3/mm3     RBC 5.46 4.14 - 5.80 10*6/mm3    Hemoglobin 14.6 13.0 - 17.7 g/dL    Hematocrit 43.0 37.5 - 51.0 %    MCV 78.8 (L) 79.0 - 97.0 fL    MCH 26.7 26.6 - 33.0 pg    MCHC 34.0 31.5 - 35.7 g/dL    RDW 13.5 12.3 - 15.4 %    RDW-SD 37.9 37.0 - 54.0 fl    MPV 10.1 6.0 - 12.0 fL    Platelets 323 140 - 450 10*3/mm3       I ordered the above labs and independently reviewed the results.        RADIOLOGY  CT Abdomen Pelvis With Contrast    Result Date: 11/18/2023  Patient: NORA STEWART  Time Out: 03:54 Exam(s): CT ABDOMEN + PELVIS With Contrast EXAM:   CT Abdomen and Pelvis With Intravenous Contrast CLINICAL HISTORY:    Reason for exam: abd pain. TECHNIQUE:   Axial computed tomography images of the abdomen and pelvis with intravenous contrast.  CTDI is 42.17 mGy and DLP is 2291 mGy-cm.  This CT exam was performed according to the principle of ALARA (As Low As Reasonably Achievable) by using one or more of the following dose reduction techniques: automated exposure control, adjustment of the mA and or kV according to patient size, and or use of iterative reconstruction technique. COMPARISON:   No relevant prior studies available. FINDINGS:   Lung bases:  Unremarkable.  No mass.  No consolidation.  ABDOMEN:   Liver:  Unremarkable.  No mass.   Gallbladder and bile ducts:  Punctate cholelithiasis without gallbladder distention.  No ductal dilation.   Pancreas:  Unremarkable.  No mass.  No ductal dilation.   Spleen:  Unremarkable.  No splenomegaly.   Adrenals:  Unremarkable.  No mass.   Kidneys and ureters:  Unremarkable.  No solid mass.  No hydronephrosis.   Stomach and bowel:  Unremarkable.  No obstruction.  No mucosal thickening.  PELVIS:   Appendix:  No findings to suggest acute appendicitis.   Bladder:  Unremarkable.  No mass.   Reproductive:  Unremarkable as visualized.  ABDOMEN and PELVIS:   Intraperitoneal space:  Unremarkable.  No free air.  No significant fluid collection.   Bones joints:  No acute fracture.  No dislocation.    Soft tissues:  Unremarkable.   Vasculature:  Unremarkable.  No abdominal aortic aneurysm.   Lymph nodes:  Unremarkable.  No enlarged lymph nodes. IMPRESSION:       No acute findings in the abdomen or pelvis.     Electronically signed by Wilman Dang MD on 11-18-23 at 0354    XR Chest 1 View    Result Date: 11/17/2023  XR CHEST 1 VW-  Clinical: Chest pain, epigastric discomfort  COMPARISON examination 11/15/2023  FINDINGS: Cardiac size is within normal limits. No mediastinal or hilar abnormality. The lungs are clear. There is a lucency demonstrated under the left hemidiaphragm which probably represents gastric bubble. It is more curvilinear than typically seen (worrisome for free air) and given patient's symptoms, I would represent left side down decubitus view of the abdomen as follow-up.  This report was finalized on 11/17/2023 11:40 PM by Dr. Louie Billings M.D on Workstation: [id number]       I ordered the above noted radiological studies. Reviewed by me and discussed with radiologist.  See dictation for official radiology interpretation.      PROCEDURES    Procedures      MEDICATIONS GIVEN IN ER    Medications   sodium chloride 0.9 % flush 10 mL (has no administration in time range)   sodium chloride 0.9 % flush 10 mL (has no administration in time range)   sodium chloride 0.9 % flush 10 mL (has no administration in time range)   sodium chloride 0.9 % infusion 40 mL (has no administration in time range)   acetaminophen (TYLENOL) tablet 650 mg (650 mg Oral Given 11/18/23 0524)     Or   acetaminophen (TYLENOL) 160 MG/5ML oral solution 650 mg ( Oral Not Given:  See Alt 11/18/23 0524)     Or   acetaminophen (TYLENOL) suppository 650 mg ( Rectal Not Given:  See Alt 11/18/23 0524)   calcium carbonate (TUMS) chewable tablet 500 mg (200 mg elemental) (2 tablets Oral Given 11/18/23 0524)   pantoprazole (PROTONIX) injection 40 mg (has no administration in time range)   diphenhydrAMINE (BENADRYL) injection 12.5 mg  (has no administration in time range)   aluminum-magnesium hydroxide-simethicone (MAALOX MAX) 400-400-40 MG/5ML suspension 15 mL (has no administration in time range)   Lidocaine Viscous HCl (XYLOCAINE) 2 % solution 5 mL (has no administration in time range)   pantoprazole (PROTONIX) injection 40 mg (40 mg Intravenous Given 11/17/23 2327)   aluminum-magnesium hydroxide-simethicone (MAALOX MAX) 400-400-40 MG/5ML suspension 15 mL (15 mL Oral Given 11/17/23 2326)   iopamidol (ISOVUE-300) 61 % injection 100 mL (85 mL Intravenous Given 11/18/23 0016)   prochlorperazine (COMPAZINE) injection 10 mg (10 mg Intravenous Given 11/18/23 0130)   morphine injection 2 mg (2 mg Intravenous Given 11/18/23 0129)         ORDERS PLACED DURING THIS VISIT:  Orders Placed This Encounter   Procedures    XR Chest 1 View    CT Abdomen Pelvis With Contrast    Comprehensive Metabolic Panel    Protime-INR    aPTT    High Sensitivity Troponin T    BNP    Lipase    Lactic Acid, Plasma    CBC Auto Differential    High Sensitivity Troponin T 2Hr    Basic Metabolic Panel    CBC (No Diff)    High Sensitivity Troponin T    Diet: Liquid Diets; Clear Liquid; Fluid Consistency: Thin (IDDSI 0)    Monitor Blood Pressure    Pulse Oximetry, Continuous    Vital Signs    Intake & Output    Weigh Patient    Oral Care    Saline Lock & Maintain IV Access    Place Sequential Compression Device    Maintain Sequential Compression Device    Code Status and Medical Interventions:    [facility] (on-call MD unless specified) Details    Inpatient Gastroenterology Consult    Inpatient Case Management  Consult    POC Glucose Once    ECG 12 Lead Chest Pain    ECG 12 Lead Chest Pain    SCANNED - TELEMETRY      Insert Peripheral IV    Insert Peripheral IV    Initiate Observation Status    CBC & Differential         PROGRESS, DATA ANALYSIS, CONSULTS, AND MEDICAL DECISION MAKING    All labs have been independently interpreted by me.  All radiology studies have been  reviewed by me and discussed with radiologist dictating the report.   EKG's independently viewed and interpreted by me.  Discussion below represents my analysis of pertinent findings related to patient's condition, differential diagnosis, treatment plan and final disposition.    My differential diagnosis for chest pain includes but is not limited to:  Muscle strain, costochondritis, myositis, pleurisy, rib fracture, intercostal neuritis, herpes zoster, tumor, myocardial infarction, coronary syndrome, unstable angina, angina, aortic dissection, mitral valve prolapse, pericarditis, palpitations, pulmonary embolus, pneumonia, pneumothorax, lung cancer, GERD, esophagitis, esophageal spasm      Clinical Scores:              ED Course as of 11/18/23 0651   Fri Nov 17, 2023   2319 EKG          EKG time: 2314  Rhythm/Rate: Sinus rhythm rate 84  P waves and MO: Normal  QRS, axis: Narrow regular  ST and T waves: Nonspecific    Interpreted Contemporaneously by me, independently viewed  No significant changed compared to prior EKG from 11/14/2023   [TJ]   2348 WBC(!): 22.63  Patient was on IV steroids in the hospital [TJ]   2348 PTT: 22.7 [TJ]   2348 Protime: 13.8 [TJ]   2348 INR: 1.05 [TJ]   2348 Hemoglobin: 15.2 [TJ]   2348 Platelets: 370 [TJ]   2355 HS Troponin T: 9 [TJ]   2355 Lipase: 59 [TJ]   2355 proBNP: <36.0 [TJ]   Sat Nov 18, 2023   0050 Lactate: 1.4 [TJ]   0230 Troponin T Delta: 0 [TJ]   0230 Lactate: 1.4 [TJ]   0230 Lipase: 59 [TJ]   0230 proBNP: <36.0 [TJ]   0230 Creatinine: 1.06 [TJ]   0230 WBC(!): 22.63 [TJ]   0411 Patient is still fairly symptomatic despite medications in the ED.  Will admit for GI consult. [TJ]      ED Course User Index  [TJ] Janes Barrios MD           PPE: The patient wore a mask and I wore an N95 mask throughout the entire patient encounter.       AS OF 06:51 EST VITALS:    BP - (!) 144/102  HR - 112  TEMP - 97.9 °F (36.6 °C) (Oral)  O2 SATS - 93%        DIAGNOSIS  Final diagnoses:    Epigastric pain   Chest pain, unspecified type   Leukocytosis, unspecified type         DISPOSITION  ED Disposition       ED Disposition   Decision to Admit    Condition   --    Comment   Level of Care: Telemetry [5]   Diagnosis: Epigastric pain [002243]   Admitting Physician: JUNE BULLARD [[id number]]                    Note Disclaimer: At [facility], we believe that sharing information builds trust and better relationships. You are receiving this note because you recently visited [facility]. It is possible you will see health information before a provider has talked with you about it. This kind of information can be easy to misunderstand. To help you fully understand what it means for your health, we urge you to discuss this note with your provider.         Janes Barrios MD  11/18/23 0675

## 2023-11-18 NOTE — PLAN OF CARE
Problem: Adult Inpatient Plan of Care  Goal: Plan of Care Review  Outcome: Ongoing, Progressing  Flowsheets (Taken 11/18/2023 1842)  Plan of Care Reviewed With: patient  Outcome Evaluation: Pt AxOx4, medications given as scheduled, pain still c/o of pain through out shift , 2L NC, VSS  Goal: Patient-Specific Goal (Individualized)  Outcome: Ongoing, Progressing  Goal: Absence of Hospital-Acquired Illness or Injury  Outcome: Ongoing, Progressing  Intervention: Identify and Manage Fall Risk  Recent Flowsheet Documentation  Taken 11/18/2023 1835 by Corinne Sousa RN  Safety Promotion/Fall Prevention:   activity supervised   assistive device/personal items within reach   clutter free environment maintained   fall prevention program maintained   nonskid shoes/slippers when out of bed  Taken 11/18/2023 1600 by Corinne Sousa RN  Safety Promotion/Fall Prevention:   activity supervised   assistive device/personal items within reach   clutter free environment maintained   fall prevention program maintained   muscle strengthening facilitated   nonskid shoes/slippers when out of bed   safety round/check completed  Taken 11/18/2023 1434 by Corinne Sousa RN  Safety Promotion/Fall Prevention:   activity supervised   assistive device/personal items within reach   clutter free environment maintained   fall prevention program maintained   nonskid shoes/slippers when out of bed   safety round/check completed  Taken 11/18/2023 1217 by Corinne Sousa RN  Safety Promotion/Fall Prevention:   activity supervised   assistive device/personal items within reach   clutter free environment maintained   fall prevention program maintained   nonskid shoes/slippers when out of bed   safety round/check completed  Taken 11/18/2023 1024 by Corinne Sousa RN  Safety Promotion/Fall Prevention:   activity supervised   assistive device/personal items within reach   clutter free environment maintained   fall prevention program maintained   nonskid  shoes/slippers when out of bed   safety round/check completed  Taken 11/18/2023 0840 by Corinne Sousa RN  Safety Promotion/Fall Prevention:   activity supervised   assistive device/personal items within reach   clutter free environment maintained   fall prevention program maintained   nonskid shoes/slippers when out of bed   safety round/check completed  Intervention: Prevent Skin Injury  Recent Flowsheet Documentation  Taken 11/18/2023 1835 by Corinne Sousa RN  Body Position: position changed independently  Taken 11/18/2023 1434 by Corinne Sousa RN  Body Position: position changed independently  Taken 11/18/2023 1217 by Corinne Sousa RN  Body Position: position changed independently  Taken 11/18/2023 1024 by Corinne Sousa RN  Body Position: position changed independently  Taken 11/18/2023 0840 by Corinne Sousa RN  Body Position: position changed independently  Intervention: Prevent and Manage VTE (Venous Thromboembolism) Risk  Recent Flowsheet Documentation  Taken 11/18/2023 1835 by Corinne Sousa RN  Activity Management: up ad lin  Taken 11/18/2023 1600 by Corinne Sousa RN  Activity Management: up ad lin  Taken 11/18/2023 1434 by Corinne Sousa RN  Activity Management: up ad lin  Taken 11/18/2023 1217 by Corinne Sousa RN  Activity Management: up ad lin  Taken 11/18/2023 1024 by Corinne Sousa RN  Activity Management: up ad lni  Taken 11/18/2023 0840 by Corinne Sousa RN  Activity Management: up ad lin  Range of Motion: active ROM (range of motion) encouraged  Intervention: Prevent Infection  Recent Flowsheet Documentation  Taken 11/18/2023 1835 by Corinne Sousa RN  Infection Prevention: hand hygiene promoted  Goal: Optimal Comfort and Wellbeing  Outcome: Ongoing, Progressing  Intervention: Provide Person-Centered Care  Recent Flowsheet Documentation  Taken 11/18/2023 1434 by Corinne Sousa RN  Trust Relationship/Rapport:   care explained   choices provided   emotional support provided    empathic listening provided   questions answered  Taken 11/18/2023 0840 by Corinne Sousa RN  Trust Relationship/Rapport:   care explained   choices provided   emotional support provided   empathic listening provided   questions answered   questions encouraged   reassurance provided   thoughts/feelings acknowledged  Goal: Readiness for Transition of Care  Outcome: Ongoing, Progressing     Problem: Adjustment to Illness (Sepsis/Septic Shock)  Goal: Optimal Coping  Outcome: Ongoing, Progressing  Intervention: Optimize Psychosocial Adjustment to Illness  Recent Flowsheet Documentation  Taken 11/18/2023 1434 by Corinne Sousa RN  Family/Support System Care:   self-care encouraged   support provided  Taken 11/18/2023 0840 by Corinne Sousa RN  Family/Support System Care:   self-care encouraged   support provided     Problem: Bleeding (Sepsis/Septic Shock)  Goal: Absence of Bleeding  Outcome: Ongoing, Progressing     Problem: Glycemic Control Impaired (Sepsis/Septic Shock)  Goal: Blood Glucose Level Within Desired Range  Outcome: Ongoing, Progressing     Problem: Infection Progression (Sepsis/Septic Shock)  Goal: Absence of Infection Signs and Symptoms  Outcome: Ongoing, Progressing  Intervention: Initiate Sepsis Management  Recent Flowsheet Documentation  Taken 11/18/2023 1835 by Corinne Sousa RN  Infection Prevention: hand hygiene promoted  Intervention: Promote Recovery  Recent Flowsheet Documentation  Taken 11/18/2023 1835 by Corinne Sousa RN  Activity Management: up ad lin  Taken 11/18/2023 1600 by Corinne Sousa RN  Activity Management: up ad lin  Taken 11/18/2023 1434 by Corinne Sousa RN  Activity Management: up ad lin  Taken 11/18/2023 1217 by Corinne Sousa RN  Activity Management: up ad lin  Taken 11/18/2023 1024 by Corinne Sousa RN  Activity Management: up ad lin  Taken 11/18/2023 0840 by Corinne Sousa RN  Activity Management: up ad lin     Problem: Nutrition Impaired (Sepsis/Septic Shock)  Goal:  Optimal Nutrition Intake  Outcome: Ongoing, Progressing   Goal Outcome Evaluation:  Plan of Care Reviewed With: patient           Outcome Evaluation: Pt AxOx4, medications given as scheduled, pain still c/o of pain through out shift , 2L NC, VSS

## 2023-11-18 NOTE — OUTREACH NOTE
Prep Survey      Flowsheet Row Responses   [facility] patient discharged from? [place]   Is LACE score < 7 ? Yes   Eligibility [facility]   Date of Admission 11/15/23   Date of Discharge 11/17/23   Discharge Disposition Home or Self Care   Discharge diagnosis Fever   Does the patient have one of the following disease processes/diagnoses(primary or secondary)? Other   Does the patient have Home health ordered? No   Is there a DME ordered? No   Prep survey completed? Yes            Olimpia COLLINS - Registered Nurse

## 2023-11-18 NOTE — CONSULTS
GI CONSULT  NOTE:    Referring Provider: Dr. Degroot    Chief complaint: Epigastric pain    Subjective .     History of present illness: Josiah Loomis is a 38 y.o. male with history of DM type II, GERD, HTN, HLD, and chronic panuveitis with subsequent glaucoma who presents with complaint of epigastric pain.  He was just discharged from the hospital yesterday after admission for allergic reaction on 11/15.  Now he complains of acid reflux that rises up and sits in his chest and causes a stabbing epigastric pain.  GI cocktail did not help his pain.  He has persistent hiccups and nausea.  He is nontender on exam.  He is tolerating liquids for lunch.  No blood in stool or change in bowel habits. 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  His alkaline phosphatase has been chronically elevated since 2017.    Labs -alk phos 118, WBC 16.56, CMP and CBC otherwise WNL  US RUQ normal  CT normal      Endo History:  None     Past Medical History:  Past Medical History:   Diagnosis Date    GERD (gastroesophageal reflux disease)     Hyperlipidemia     Hypertension        Past Surgical History:  History reviewed. No pertinent surgical history.    Social History:  Social History     Tobacco Use    Smoking status: Never    Smokeless tobacco: Never   Vaping Use    Vaping Use: Some days    Substances: THC    Devices: Pre-filled or refillable cartridge   Substance Use Topics    Alcohol use: Yes     Comment: rarely    Drug use: Yes     Frequency: 1.0 times per week     Types: Marijuana     Comment: smokes daily per pt       Family History:  Family History   Problem Relation Age of Onset    Crohn's disease Father     Diabetes Maternal Grandmother     Diabetes Paternal Grandmother        Medications:  Medications Prior to Admission   Medication Sig Dispense Refill Last Dose    Adalimumab (Humira) 40 MG/0.4ML Prefilled Syringe Kit injection Inject 0.4 mL under the  skin into the appropriate area as directed 1 (One) Time.   Past Month    azaTHIOprine (IMURAN) 50 MG tablet Take 1 tablet by mouth 2 (Two) Times a Day.   11/13/2023    diphenhydrAMINE (BENADRYL) 50 MG capsule Take 1 capsule by mouth Every 6 (Six) Hours As Needed for Itching.   Past Week    famotidine (PEPCID) 40 MG tablet TAKE 1 TABLET BY MOUTH ONCE DAILY AT NIGHT 90 tablet 0 11/18/2023    insulin aspart (NovoLOG FlexPen ReliOn) 100 UNIT/ML solution pen-injector sc pen INJECT UP TO 36 UNITS DAILY VIA SLIDING SCALE(3U)150-200, 6U(201-250), 9U(251-300), 12 USE(301-351), 33 mL 3 Past Week    ondansetron (ZOFRAN) 4 MG tablet Take 1-2 tablets by mouth.   11/17/2023    prednisoLONE acetate (PRED FORTE) 1 % ophthalmic suspension Administer 1 drop to both eyes Daily.   11/17/2023    predniSONE (DELTASONE) 5 MG tablet TAKE 6 TABLETS BY MOUTH 1 TIME DAILY FOR 2 WEEKS. THEN TAKE 4 TABLETS BY MOUTH DAILY FOR 4 WEEKS.   11/17/2023    rosuvastatin (CRESTOR) 40 MG tablet TAKE 1 TABLET BY MOUTH ONCE DAILY AT NIGHT 90 tablet 0 11/17/2023    Semaglutide, 1 MG/DOSE, (OZEMPIC) 4 MG/3ML solution pen-injector Inject 1 mg under the skin into the appropriate area as directed 1 (One) Time Per Week. 3 mL 5 Past Week    TIMOLOL-DORZOLAMID-LATANOPROST OP Apply  to eye(s) as directed by provider.   Past Week    Cholecalciferol (vitamin D3) 125 MCG (5000 UT) tablet tablet Take 1 tablet by mouth Daily. 90 tablet 1     Continuous Blood Gluc Sensor (FreeStyle Checo 3 Sensor) misc 1 each Every 14 (Fourteen) Days. 6 each 3     glucose blood (Accu-Chek Guide) test strip 1 each by Other route 4 (Four) Times a Day. Use as instructed. Dx. E11.9 400 each 12     insulin degludec (Tresiba FlexTouch) 100 UNIT/ML solution pen-injector injection Inject 11 Units under the skin into the appropriate area as directed Every Night. 15 mL 5     Insulin Pen Needle (BD Pen Needle Dory 2nd Gen) 32G X 4 MM misc USE AS DIRECTED FOUR TIMES DAILY 400 each 5     Lancets 30G  "misc 1 Device 4 (Four) Times a Day. Dx. E11.9 400 each 12        Scheduled Meds:aluminum-magnesium hydroxide-simethicone, 15 mL, Oral, Once  azaTHIOprine, 50 mg, Oral, BID  dorzolamide (TRUSOPT) 2 % 1 drop, timolol (TIMOPTIC) 0.5 % 1 drop for Cosopt 22.3-6.8 mg/mL, , Both Eyes, BID  insulin glargine, 11 Units, Subcutaneous, Nightly  insulin lispro, 3-14 Units, Subcutaneous, 4x Daily AC & at Bedtime  latanoprost, 1 drop, Both Eyes, Nightly  pantoprazole, 40 mg, Intravenous, Q12H  prednisoLONE acetate, 1 drop, Both Eyes, Daily  predniSONE, 5 mg, Oral, Daily With Breakfast  rosuvastatin, 40 mg, Oral, Nightly  sodium chloride, 10 mL, Intravenous, Q12H      Continuous Infusions:   PRN Meds:.  acetaminophen **OR** acetaminophen **OR** acetaminophen    calcium carbonate    dextrose    dextrose    glucagon (human recombinant)    Lidocaine Viscous HCl    [COMPLETED] Insert Peripheral IV **AND** sodium chloride    sodium chloride    sodium chloride    ALLERGIES:  Zofran [ondansetron]    Review of Systems:  Review of Systems   Constitutional: Negative.    HENT: Negative.     Eyes:  Positive for visual disturbance.   Respiratory: Negative.     Cardiovascular: Negative.    Gastrointestinal:  Positive for abdominal pain and nausea.   Genitourinary: Negative.    Musculoskeletal: Negative.    Skin: Negative.    Neurological: Negative.    Psychiatric/Behavioral: Negative.           Objective     Vital Signs:   Vitals:    11/18/23 0528 11/18/23 0720 11/18/23 1300 11/18/23 1450   BP:  141/95 143/84    BP Location:  Left arm Left arm    Patient Position:  Lying Lying    Pulse: 112 108 91    Resp:  18 18    Temp:  98.1 °F (36.7 °C) 98.4 °F (36.9 °C)    TempSrc:  Oral Oral    SpO2: 93% 100% 99%    Weight:    (!) 160 kg (352 lb)   Height:    182.9 cm (72\")       Physical Exam: Resting in bed    General Appearance:    Awake and alert, in no acute distress   Head:    Normocephalic, without obvious abnormality   Throat:   No oral lesions, no " thrush, oral mucosa moist   Lungs:     Respirations regular, even and unlabored   Chest Wall:    No abnormalities observed   Abdomen:     Soft, non-tender, non-distended, no rebound or guarding, no hepatosplenomegaly   Rectal:     Deferred   Extremities:   Moves all extremities, no edema, no cyanosis   Pulses:   Pulses palpable and equal bilaterally   Skin:   No bruising, no rash, no jaundice, normal palpation   Lymph nodes:   No cervical, supraclavicular or submandibular palpable adenopathy   Neurologic:   No asterixis       Results Review:  I reviewed the patient's labs and imaging.     CBC  Results from last 7 days   Lab Units 11/18/23 0557 11/17/23 2323 11/17/23 0516 11/16/23  0517 11/15/23  1349 11/14/23  0804   RBC 10*6/mm3 5.46 5.76 5.87* 6.29* 6.38* 5.82*   WBC 10*3/mm3 16.56* 22.63* 17.74* 16.22* 14.82* 8.68   HEMOGLOBIN g/dL 14.6 15.2 15.3 16.4 16.7 15.3   PLATELETS 10*3/mm3 323 370 278 269 255 251       CMP  Results from last 7 days   Lab Units 11/18/23  0557 11/17/23 2323 11/17/23 0516 11/16/23  0517 11/15/23  1349 11/14/23  0804   SODIUM mmol/L 140 138 137 136 136 138   POTASSIUM mmol/L 3.7 4.0 4.4 4.3 4.1 4.1   CHLORIDE mmol/L 103 104 104 103 103 101   CO2 mmol/L 28.1 25.0 25.3 21.7* 21.0* 28.8   BUN mg/dL 13 15 10 6 5* 6   CREATININE mg/dL 1.16 1.06 0.86 0.95 1.01 1.05   GLUCOSE mg/dL 151* 100* 205* 181* 135* 140*   ALBUMIN g/dL  --  3.5 3.8  --  3.8 4.3   BILIRUBIN mg/dL  --  0.4 0.5  --  1.3* 0.7   ALK PHOS U/L  --  118* 131*  --  172* 179*   AST (SGOT) U/L  --  18 16  --  45* 58*   ALT (SGPT) U/L  --  33 35  --  64* 63*       Amylase and Lipase  Results from last 7 days   Lab Units 11/17/23  2323 11/14/23  0804   LIPASE U/L 59 54       CRP         Imaging Results (Last 24 Hours)       Procedure Component Value Units Date/Time    CT Angiogram Chest [[id number]] Collected: 11/18/23 1220     Updated: 11/18/23 1230    Narrative:      CT ANGIOGRAM CHEST-     Radiation dose reduction techniques were  utilized, including automated  exposure control and exposure modulation based on body size.     Clinical: Chest pain, evaluate for pulmonary embolus     CT scan of the chest performed with intravenous contrast, pulmonary  embolus protocol to include coronal, sagittal and three-dimensional  reconstruction.     FINDINGS:  1. Cardiac size within normal limits, no pericardial or esophageal  abnormality. Contour and caliber of the thoracic aorta within normal  limits, no dissection. Resolution of the pulmonary arterial tree is less  than optimal due to the patient's large body habitus. No gross pulmonary  embolus seen. Small pulmonary embolus not excluded.     2. Multiple small vague nodular foci of consolidation demonstrated  within the right upper lobe, the appearance most consistent with  atypical pneumonia. Nodular opacities are all subcentimeter in size. The  left lung is clear. No pleural effusion seen. Repeat CT chest  recommended in 6-8 weeks.     3. Limited images through the upper abdomen are unremarkable.                    This report was finalized on 11/18/2023 12:27 PM by Dr. Louie Billings M.D on Workstation: [id number]       CT Abdomen Pelvis With Contrast [[id number]] Collected: 11/18/23 0354     Updated: 11/18/23 0354    Narrative:        Patient: NORA STEWART  Time Out: 03:54  Exam(s): CT ABDOMEN + PELVIS With Contrast     EXAM:    CT Abdomen and Pelvis With Intravenous Contrast    CLINICAL HISTORY:     Reason for exam: abd pain.    TECHNIQUE:    Axial computed tomography images of the abdomen and pelvis with   intravenous contrast.  CTDI is 42.17 mGy and DLP is 2291 mGy-cm.  This CT   exam was performed according to the principle of ALARA (As Low As   Reasonably Achievable) by using one or more of the following dose   reduction techniques: automated exposure control, adjustment of the mA   and or kV according to patient size, and or use of iterative   reconstruction technique.    COMPARISON:    No  relevant prior studies available.    FINDINGS:    Lung bases:  Unremarkable.  No mass.  No consolidation.     ABDOMEN:    Liver:  Unremarkable.  No mass.    Gallbladder and bile ducts:  Punctate cholelithiasis without   gallbladder distention.  No ductal dilation.    Pancreas:  Unremarkable.  No mass.  No ductal dilation.    Spleen:  Unremarkable.  No splenomegaly.    Adrenals:  Unremarkable.  No mass.    Kidneys and ureters:  Unremarkable.  No solid mass.  No hydronephrosis.    Stomach and bowel:  Unremarkable.  No obstruction.  No mucosal   thickening.     PELVIS:    Appendix:  No findings to suggest acute appendicitis.    Bladder:  Unremarkable.  No mass.    Reproductive:  Unremarkable as visualized.     ABDOMEN and PELVIS:    Intraperitoneal space:  Unremarkable.  No free air.  No significant   fluid collection.    Bones joints:  No acute fracture.  No dislocation.    Soft tissues:  Unremarkable.    Vasculature:  Unremarkable.  No abdominal aortic aneurysm.    Lymph nodes:  Unremarkable.  No enlarged lymph nodes.    IMPRESSION:         No acute findings in the abdomen or pelvis.      Impression:          Electronically signed by Wilman Dang MD on 11-18-23 at 0354    XR Chest 1 View [[id number]] Collected: 11/17/23 2338     Updated: 11/17/23 2343    Narrative:      XR CHEST 1 VW-     Clinical: Chest pain, epigastric discomfort     COMPARISON examination 11/15/2023     FINDINGS: Cardiac size is within normal limits. No mediastinal or hilar  abnormality. The lungs are clear. There is a lucency demonstrated under  the left hemidiaphragm which probably represents gastric bubble. It is  more curvilinear than typically seen (worrisome for free air) and given  patient's symptoms, I would represent left side down decubitus view of  the abdomen as follow-up.     This report was finalized on 11/17/2023 11:40 PM by Dr. Louie Billings M.D on Workstation: [id number]                 ASSESSMENT:  38 y.o. male with history  of DM type II, GERD, HTN, HLD, and chronic panuveitis with subsequent glaucoma who presents with complaint of epigastric pain.   -Epigastric pain -possibly GERD versus esophageal spasm.  Ultrasound and CT unremarkable.  -Chronic panuveitis -on Humira, azathioprine, prednisone  -DM type II, HTN, HLD, obesity  -GERD  -Chronically elevated alkaline phosphatase, has had prior liver work-up.  -Leukocytosis -possibly related to chronic steroids         PLAN:  Plan for EGD on 11/19.  Liquid diet today, n.p.o. after midnight.  PPI twice daily.  Consider adding super fate pending EGD findings.  GI will follow.    We appreciate the referral.    Dilma Nagel, ISAIAH  11/18/23  16:21 EST

## 2023-11-18 NOTE — ED NOTES
.Nursing report ED to floor  Josiah Loomis  38 y.o.  male    HPI :   Chief Complaint   Patient presents with    Abdominal Pain       Admitting doctor:   Ethan Cavazos MD    Admitting diagnosis:   The primary encounter diagnosis was Epigastric pain. Diagnoses of Chest pain, unspecified type and Leukocytosis, unspecified type were also pertinent to this visit.    Code status:   Current Code Status       Date Active Code Status Order ID Comments User Context       11/18/2023 0415 CPR (Attempt to Resuscitate) [id number]  Gracy Mariee APRN ED        Question Answer    Code Status (Patient has no pulse and is not breathing) CPR (Attempt to Resuscitate)    Medical Interventions (Patient has pulse or is breathing) Full Support                    Allergies:   Zofran [ondansetron]    Isolation:   No active isolations    Intake and Output  No intake or output data in the 24 hours ending 11/18/23 0434    Weight:       11/17/23  2255   Weight: (!) 160 kg (352 lb)       Most recent vitals:   Vitals:    11/17/23 2304 11/17/23 2321 11/18/23 0001 11/18/23 0121   BP: (!) 177/120   (!) 170/101   Pulse:  84 84 94   Resp:       Temp:       TempSrc:       SpO2:  100% 99% 98%   Weight:       Height:           Active LDAs/IV Access:   Lines, Drains & Airways       Active LDAs       Name Placement date Placement time Site Days    Peripheral IV 11/17/23 2323 Right Antecubital 11/17/23 2323  Antecubital  less than 1                    Labs (abnormal labs have a star):   Labs Reviewed   COMPREHENSIVE METABOLIC PANEL - Abnormal; Notable for the following components:       Result Value    Glucose 100 (*)     Alkaline Phosphatase 118 (*)     All other components within normal limits    Narrative:     GFR Normal >60  Chronic Kidney Disease <60  Kidney Failure <15     CBC WITH AUTO DIFFERENTIAL - Abnormal; Notable for the following components:    WBC 22.63 (*)     MCV 78.5 (*)     MCH 26.4 (*)     Neutrophil % 82.2 (*)     Lymphocyte %  13.4 (*)     Monocyte % 3.4 (*)     Eosinophil % 0.2 (*)     Immature Grans % 0.7 (*)     Neutrophils, Absolute 18.58 (*)     Immature Grans, Absolute 0.16 (*)     All other components within normal limits   PROTIME-INR - Normal   APTT - Normal   TROPONIN - Normal    Narrative:     High Sensitive Troponin T Reference Range:  <14.0 ng/L- Negative Female for AMI  <22.0 ng/L- Negative Male for AMI  >=14 - Abnormal Female indicating possible myocardial injury.  >=22 - Abnormal Male indicating possible myocardial injury.   Clinicians would have to utilize clinical acumen, EKG, Troponin, and serial changes to determine if it is an Acute Myocardial Infarction or myocardial injury due to an underlying chronic condition.        BNP (IN-HOUSE) - Normal    Narrative:     This assay is used as an aid in the diagnosis of individuals suspected of having heart failure. It can be used as an aid in the diagnosis of acute decompensated heart failure (ADHF) in patients presenting with signs and symptoms of ADHF to the emergency department (ED). In addition, NT-proBNP of <300 pg/mL indicates ADHF is not likely.    Age Range Result Interpretation  NT-proBNP Concentration (pg/mL:      <50             Positive            >450                   Gray                 300-450                    Negative             <300    50-75           Positive            >900                  Gray                300-900                  Negative            <300      >75             Positive            >1800                  Gray                300-1800                  Negative            <300   LIPASE - Normal   LACTIC ACID, PLASMA - Normal   HIGH SENSITIVITIY TROPONIN T 2HR - Normal    Narrative:     High Sensitive Troponin T Reference Range:  <14.0 ng/L- Negative Female for AMI  <22.0 ng/L- Negative Male for AMI  >=14 - Abnormal Female indicating possible myocardial injury.  >=22 - Abnormal Male indicating possible myocardial injury.   Clinicians would  have to utilize clinical acumen, EKG, Troponin, and serial changes to determine if it is an Acute Myocardial Infarction or myocardial injury due to an underlying chronic condition.        BASIC METABOLIC PANEL   CBC (NO DIFF)   CBC AND DIFFERENTIAL    Narrative:     The following orders were created for panel order CBC & Differential.  Procedure                               Abnormality         Status                     ---------                               -----------         ------                     CBC Auto Differential[[id number]]        Abnormal            Final result                 Please view results for these tests on the individual orders.       EKG:   ECG 12 Lead Chest Pain   Preliminary Result   HEART RATE= 84  bpm   RR Interval= 714  ms   DC Interval= 140  ms   P Horizontal Axis= 10  deg   P Front Axis= 29  deg   QRSD Interval= 104  ms   QT Interval= 339  ms   QTcB= 401  ms   QRS Axis= 15  deg   T Wave Axis= 28  deg   - OTHERWISE NORMAL ECG -   Sinus rhythm   RSR' in V1 or V2, right VCD or RVH   Minimal ST elevation, anterior leads   Baseline wander in lead(s) II,III,aVR,aVF   Electronically Signed By:    Date and Time of Study: 2023-11-17 23:14:59          Meds given in ED:   Medications   sodium chloride 0.9 % flush 10 mL (has no administration in time range)   sodium chloride 0.9 % flush 10 mL (has no administration in time range)   sodium chloride 0.9 % flush 10 mL (has no administration in time range)   sodium chloride 0.9 % infusion 40 mL (has no administration in time range)   acetaminophen (TYLENOL) tablet 650 mg (has no administration in time range)     Or   acetaminophen (TYLENOL) 160 MG/5ML oral solution 650 mg (has no administration in time range)     Or   acetaminophen (TYLENOL) suppository 650 mg (has no administration in time range)   calcium carbonate (TUMS) chewable tablet 500 mg (200 mg elemental) (has no administration in time range)   pantoprazole (PROTONIX) injection 40 mg (has  no administration in time range)   pantoprazole (PROTONIX) injection 40 mg (40 mg Intravenous Given 11/17/23 2327)   aluminum-magnesium hydroxide-simethicone (MAALOX MAX) 400-400-40 MG/5ML suspension 15 mL (15 mL Oral Given 11/17/23 2326)   iopamidol (ISOVUE-300) 61 % injection 100 mL (85 mL Intravenous Given 11/18/23 0016)   prochlorperazine (COMPAZINE) injection 10 mg (10 mg Intravenous Given 11/18/23 0130)   morphine injection 2 mg (2 mg Intravenous Given 11/18/23 0129)       Imaging results:  CT Abdomen Pelvis With Contrast    Result Date: 11/18/2023  Electronically signed by Wilman Dang MD on 11-18-23 at 0354       Social issues:   Social History     Socioeconomic History    Marital status:    Tobacco Use    Smoking status: Never    Smokeless tobacco: Never   Vaping Use    Vaping Use: Never used   Substance and Sexual Activity    Alcohol use: Yes     Comment: rarely    Drug use: Yes     Frequency: 1.0 times per week     Types: Marijuana     Comment: smokes daily per pt    Sexual activity: Defer       NIH Stroke Scale:       Radha Kang RN  11/18/23 04:34 EST

## 2023-11-19 ENCOUNTER — TRANSITIONAL CARE MANAGEMENT TELEPHONE ENCOUNTER (OUTPATIENT)
Dept: CALL CENTER | Facility: HOSPITAL | Age: 38
End: 2023-11-19
Payer: COMMERCIAL

## 2023-11-19 ENCOUNTER — ON CAMPUS - OUTPATIENT (OUTPATIENT)
Dept: URBAN - METROPOLITAN AREA HOSPITAL 114 | Facility: HOSPITAL | Age: 38
End: 2023-11-19
Payer: COMMERCIAL

## 2023-11-19 ENCOUNTER — APPOINTMENT (OUTPATIENT)
Dept: CARDIOLOGY | Facility: HOSPITAL | Age: 38
End: 2023-11-19
Payer: COMMERCIAL

## 2023-11-19 ENCOUNTER — ANESTHESIA EVENT (OUTPATIENT)
Dept: GASTROENTEROLOGY | Facility: HOSPITAL | Age: 38
End: 2023-11-19
Payer: COMMERCIAL

## 2023-11-19 ENCOUNTER — ANESTHESIA (OUTPATIENT)
Dept: GASTROENTEROLOGY | Facility: HOSPITAL | Age: 38
End: 2023-11-19
Payer: COMMERCIAL

## 2023-11-19 DIAGNOSIS — R10.13 EPIGASTRIC PAIN: ICD-10-CM

## 2023-11-19 DIAGNOSIS — K31.89 OTHER DISEASES OF STOMACH AND DUODENUM: ICD-10-CM

## 2023-11-19 DIAGNOSIS — R07.9 CHEST PAIN, UNSPECIFIED: ICD-10-CM

## 2023-11-19 LAB
ALBUMIN SERPL-MCNC: 3.3 G/DL (ref 3.5–5.2)
ALBUMIN/GLOB SERPL: 1.3 G/DL
ALP SERPL-CCNC: 102 U/L (ref 39–117)
ALT SERPL W P-5'-P-CCNC: 28 U/L (ref 1–41)
ANION GAP SERPL CALCULATED.3IONS-SCNC: 6 MMOL/L (ref 5–15)
AST SERPL-CCNC: 19 U/L (ref 1–40)
BH CV LOW VAS LEFT POSTERIOR TIBIAL VESSEL: 1
BH CV LOWER VASCULAR LEFT COMMON FEMORAL AUGMENT: NORMAL
BH CV LOWER VASCULAR LEFT COMMON FEMORAL COMPETENT: NORMAL
BH CV LOWER VASCULAR LEFT COMMON FEMORAL COMPRESS: NORMAL
BH CV LOWER VASCULAR LEFT COMMON FEMORAL PHASIC: NORMAL
BH CV LOWER VASCULAR LEFT COMMON FEMORAL SPONT: NORMAL
BH CV LOWER VASCULAR LEFT DISTAL FEMORAL COMPRESS: NORMAL
BH CV LOWER VASCULAR LEFT GASTRONEMIUS COMPRESS: NORMAL
BH CV LOWER VASCULAR LEFT GREATER SAPH AK COMPRESS: NORMAL
BH CV LOWER VASCULAR LEFT GREATER SAPH BK COMPRESS: NORMAL
BH CV LOWER VASCULAR LEFT LESSER SAPH COMPRESS: NORMAL
BH CV LOWER VASCULAR LEFT MID FEMORAL AUGMENT: NORMAL
BH CV LOWER VASCULAR LEFT MID FEMORAL COMPETENT: NORMAL
BH CV LOWER VASCULAR LEFT MID FEMORAL COMPRESS: NORMAL
BH CV LOWER VASCULAR LEFT MID FEMORAL PHASIC: NORMAL
BH CV LOWER VASCULAR LEFT MID FEMORAL SPONT: NORMAL
BH CV LOWER VASCULAR LEFT PERONEAL COMPRESS: NORMAL
BH CV LOWER VASCULAR LEFT POPLITEAL AUGMENT: NORMAL
BH CV LOWER VASCULAR LEFT POPLITEAL COMPETENT: NORMAL
BH CV LOWER VASCULAR LEFT POPLITEAL COMPRESS: NORMAL
BH CV LOWER VASCULAR LEFT POPLITEAL PHASIC: NORMAL
BH CV LOWER VASCULAR LEFT POPLITEAL SPONT: NORMAL
BH CV LOWER VASCULAR LEFT POSTERIOR TIBIAL COMPRESS: NORMAL
BH CV LOWER VASCULAR LEFT POSTERIOR TIBIAL THROMBUS: NORMAL
BH CV LOWER VASCULAR LEFT PROFUNDA FEMORAL COMPRESS: NORMAL
BH CV LOWER VASCULAR LEFT PROXIMAL FEMORAL COMPRESS: NORMAL
BH CV LOWER VASCULAR LEFT SAPHENOFEMORAL JUNCTION COMPRESS: NORMAL
BH CV LOWER VASCULAR LEFT SOLEAL COMPRESS: NORMAL
BH CV LOWER VASCULAR RIGHT COMMON FEMORAL AUGMENT: NORMAL
BH CV LOWER VASCULAR RIGHT COMMON FEMORAL COMPETENT: NORMAL
BH CV LOWER VASCULAR RIGHT COMMON FEMORAL COMPRESS: NORMAL
BH CV LOWER VASCULAR RIGHT COMMON FEMORAL PHASIC: NORMAL
BH CV LOWER VASCULAR RIGHT COMMON FEMORAL SPONT: NORMAL
BH CV LOWER VASCULAR RIGHT DISTAL FEMORAL COMPRESS: NORMAL
BH CV LOWER VASCULAR RIGHT GASTRONEMIUS COMPRESS: NORMAL
BH CV LOWER VASCULAR RIGHT GREATER SAPH AK COMPRESS: NORMAL
BH CV LOWER VASCULAR RIGHT GREATER SAPH BK COMPRESS: NORMAL
BH CV LOWER VASCULAR RIGHT LESSER SAPH COMPRESS: NORMAL
BH CV LOWER VASCULAR RIGHT MID FEMORAL AUGMENT: NORMAL
BH CV LOWER VASCULAR RIGHT MID FEMORAL COMPETENT: NORMAL
BH CV LOWER VASCULAR RIGHT MID FEMORAL COMPRESS: NORMAL
BH CV LOWER VASCULAR RIGHT MID FEMORAL PHASIC: NORMAL
BH CV LOWER VASCULAR RIGHT MID FEMORAL SPONT: NORMAL
BH CV LOWER VASCULAR RIGHT PERONEAL COMPRESS: NORMAL
BH CV LOWER VASCULAR RIGHT POPLITEAL AUGMENT: NORMAL
BH CV LOWER VASCULAR RIGHT POPLITEAL COMPETENT: NORMAL
BH CV LOWER VASCULAR RIGHT POPLITEAL COMPRESS: NORMAL
BH CV LOWER VASCULAR RIGHT POPLITEAL PHASIC: NORMAL
BH CV LOWER VASCULAR RIGHT POPLITEAL SPONT: NORMAL
BH CV LOWER VASCULAR RIGHT POSTERIOR TIBIAL COMPRESS: NORMAL
BH CV LOWER VASCULAR RIGHT PROFUNDA FEMORAL COMPRESS: NORMAL
BH CV LOWER VASCULAR RIGHT PROXIMAL FEMORAL COMPRESS: NORMAL
BH CV LOWER VASCULAR RIGHT SAPHENOFEMORAL JUNCTION COMPRESS: NORMAL
BH CV VAS PRELIMINARY FINDINGS SCRIPTING: 1
BILIRUB SERPL-MCNC: 0.4 MG/DL (ref 0–1.2)
BUN SERPL-MCNC: 9 MG/DL (ref 6–20)
BUN/CREAT SERPL: 8.3 (ref 7–25)
CALCIUM SPEC-SCNC: 8.8 MG/DL (ref 8.6–10.5)
CHLORIDE SERPL-SCNC: 102 MMOL/L (ref 98–107)
CO2 SERPL-SCNC: 30 MMOL/L (ref 22–29)
CREAT SERPL-MCNC: 1.08 MG/DL (ref 0.76–1.27)
DEPRECATED RDW RBC AUTO: 37.1 FL (ref 37–54)
EGFRCR SERPLBLD CKD-EPI 2021: 91.2 ML/MIN/1.73
ERYTHROCYTE [DISTWIDTH] IN BLOOD BY AUTOMATED COUNT: 13.2 % (ref 12.3–15.4)
GLOBULIN UR ELPH-MCNC: 2.5 GM/DL
GLUCOSE BLDC GLUCOMTR-MCNC: 110 MG/DL (ref 70–130)
GLUCOSE BLDC GLUCOMTR-MCNC: 120 MG/DL (ref 70–130)
GLUCOSE BLDC GLUCOMTR-MCNC: 149 MG/DL (ref 70–130)
GLUCOSE SERPL-MCNC: 96 MG/DL (ref 65–99)
HBA1C MFR BLD: 7.1 % (ref 4.8–5.6)
HCT VFR BLD AUTO: 41.4 % (ref 37.5–51)
HGB BLD-MCNC: 13.9 G/DL (ref 13–17.7)
MCH RBC QN AUTO: 26.1 PG (ref 26.6–33)
MCHC RBC AUTO-ENTMCNC: 33.6 G/DL (ref 31.5–35.7)
MCV RBC AUTO: 77.7 FL (ref 79–97)
PLATELET # BLD AUTO: 279 10*3/MM3 (ref 140–450)
PMV BLD AUTO: 9.9 FL (ref 6–12)
POTASSIUM SERPL-SCNC: 3.9 MMOL/L (ref 3.5–5.2)
PROT SERPL-MCNC: 5.8 G/DL (ref 6–8.5)
RBC # BLD AUTO: 5.33 10*6/MM3 (ref 4.14–5.8)
SODIUM SERPL-SCNC: 138 MMOL/L (ref 136–145)
WBC NRBC COR # BLD AUTO: 9.47 10*3/MM3 (ref 3.4–10.8)

## 2023-11-19 PROCEDURE — 88305 TISSUE EXAM BY PATHOLOGIST: CPT | Performed by: INTERNAL MEDICINE

## 2023-11-19 PROCEDURE — 63710000001 DIPHENHYDRAMINE PER 50 MG: Performed by: STUDENT IN AN ORGANIZED HEALTH CARE EDUCATION/TRAINING PROGRAM

## 2023-11-19 PROCEDURE — 63710000001 AZATHIOPRINE PER 50 MG: Performed by: STUDENT IN AN ORGANIZED HEALTH CARE EDUCATION/TRAINING PROGRAM

## 2023-11-19 PROCEDURE — 25810000003 SODIUM CHLORIDE 0.9 % SOLUTION: Performed by: INTERNAL MEDICINE

## 2023-11-19 PROCEDURE — G0378 HOSPITAL OBSERVATION PER HR: HCPCS

## 2023-11-19 PROCEDURE — 85027 COMPLETE CBC AUTOMATED: CPT | Performed by: STUDENT IN AN ORGANIZED HEALTH CARE EDUCATION/TRAINING PROGRAM

## 2023-11-19 PROCEDURE — 82948 REAGENT STRIP/BLOOD GLUCOSE: CPT

## 2023-11-19 PROCEDURE — 96372 THER/PROPH/DIAG INJ SC/IM: CPT

## 2023-11-19 PROCEDURE — 63710000001 INSULIN GLARGINE PER 5 UNITS: Performed by: STUDENT IN AN ORGANIZED HEALTH CARE EDUCATION/TRAINING PROGRAM

## 2023-11-19 PROCEDURE — 83036 HEMOGLOBIN GLYCOSYLATED A1C: CPT | Performed by: STUDENT IN AN ORGANIZED HEALTH CARE EDUCATION/TRAINING PROGRAM

## 2023-11-19 PROCEDURE — 43239 EGD BIOPSY SINGLE/MULTIPLE: CPT | Performed by: INTERNAL MEDICINE

## 2023-11-19 PROCEDURE — 25010000002 ENOXAPARIN PER 10 MG: Performed by: STUDENT IN AN ORGANIZED HEALTH CARE EDUCATION/TRAINING PROGRAM

## 2023-11-19 PROCEDURE — 25010000002 PROPOFOL 10 MG/ML EMULSION: Performed by: ANESTHESIOLOGY

## 2023-11-19 PROCEDURE — 96376 TX/PRO/DX INJ SAME DRUG ADON: CPT

## 2023-11-19 PROCEDURE — 80053 COMPREHEN METABOLIC PANEL: CPT | Performed by: STUDENT IN AN ORGANIZED HEALTH CARE EDUCATION/TRAINING PROGRAM

## 2023-11-19 PROCEDURE — 93970 EXTREMITY STUDY: CPT

## 2023-11-19 RX ORDER — SODIUM CHLORIDE, SODIUM LACTATE, POTASSIUM CHLORIDE, CALCIUM CHLORIDE 600; 310; 30; 20 MG/100ML; MG/100ML; MG/100ML; MG/100ML
30 INJECTION, SOLUTION INTRAVENOUS CONTINUOUS PRN
Status: DISCONTINUED | OUTPATIENT
Start: 2023-11-19 | End: 2023-11-20 | Stop reason: HOSPADM

## 2023-11-19 RX ORDER — ENOXAPARIN SODIUM 100 MG/ML
40 INJECTION SUBCUTANEOUS EVERY 12 HOURS
Status: DISCONTINUED | OUTPATIENT
Start: 2023-11-19 | End: 2023-11-19

## 2023-11-19 RX ORDER — LIDOCAINE HYDROCHLORIDE 20 MG/ML
INJECTION, SOLUTION INFILTRATION; PERINEURAL AS NEEDED
Status: DISCONTINUED | OUTPATIENT
Start: 2023-11-19 | End: 2023-11-19 | Stop reason: SURG

## 2023-11-19 RX ORDER — SODIUM CHLORIDE 9 MG/ML
30 INJECTION, SOLUTION INTRAVENOUS CONTINUOUS PRN
Status: DISCONTINUED | OUTPATIENT
Start: 2023-11-19 | End: 2023-11-20 | Stop reason: HOSPADM

## 2023-11-19 RX ORDER — PANTOPRAZOLE SODIUM 40 MG/1
40 TABLET, DELAYED RELEASE ORAL
Status: DISCONTINUED | OUTPATIENT
Start: 2023-11-20 | End: 2023-11-20 | Stop reason: HOSPADM

## 2023-11-19 RX ORDER — PROPOFOL 10 MG/ML
VIAL (ML) INTRAVENOUS AS NEEDED
Status: DISCONTINUED | OUTPATIENT
Start: 2023-11-19 | End: 2023-11-19 | Stop reason: SURG

## 2023-11-19 RX ORDER — GLYCOPYRROLATE 0.2 MG/ML
INJECTION INTRAMUSCULAR; INTRAVENOUS AS NEEDED
Status: DISCONTINUED | OUTPATIENT
Start: 2023-11-19 | End: 2023-11-19 | Stop reason: SURG

## 2023-11-19 RX ORDER — DIPHENHYDRAMINE HCL 25 MG
25 CAPSULE ORAL ONCE
Status: COMPLETED | OUTPATIENT
Start: 2023-11-19 | End: 2023-11-19

## 2023-11-19 RX ORDER — ENOXAPARIN SODIUM 100 MG/ML
1 INJECTION SUBCUTANEOUS EVERY 12 HOURS
Status: DISCONTINUED | OUTPATIENT
Start: 2023-11-20 | End: 2023-11-20

## 2023-11-19 RX ORDER — ENOXAPARIN SODIUM 150 MG/ML
120 INJECTION SUBCUTANEOUS ONCE
Qty: 0.8 ML | Refills: 0 | Status: COMPLETED | OUTPATIENT
Start: 2023-11-19 | End: 2023-11-19

## 2023-11-19 RX ADMIN — AZATHIOPRINE 50 MG: 50 TABLET ORAL at 20:34

## 2023-11-19 RX ADMIN — SODIUM CHLORIDE 30 ML/HR: 9 INJECTION, SOLUTION INTRAVENOUS at 09:38

## 2023-11-19 RX ADMIN — PREDNISOLONE ACETATE 1 DROP: 10 SUSPENSION/ DROPS OPHTHALMIC at 08:24

## 2023-11-19 RX ADMIN — PROPOFOL 200 MG: 10 INJECTION, EMULSION INTRAVENOUS at 11:05

## 2023-11-19 RX ADMIN — TIMOLOL MALEATE: 5 SOLUTION OPHTHALMIC at 08:24

## 2023-11-19 RX ADMIN — TIMOLOL MALEATE: 5 SOLUTION OPHTHALMIC at 20:27

## 2023-11-19 RX ADMIN — LIDOCAINE HYDROCHLORIDE 100 MG: 20 INJECTION, SOLUTION INFILTRATION; PERINEURAL at 11:03

## 2023-11-19 RX ADMIN — ENOXAPARIN SODIUM 40 MG: 100 INJECTION SUBCUTANEOUS at 13:32

## 2023-11-19 RX ADMIN — ROSUVASTATIN CALCIUM 40 MG: 40 TABLET, FILM COATED ORAL at 20:26

## 2023-11-19 RX ADMIN — GLYCOPYRROLATE 0.2 MG: 0.2 INJECTION INTRAMUSCULAR; INTRAVENOUS at 11:00

## 2023-11-19 RX ADMIN — DIPHENHYDRAMINE HYDROCHLORIDE 25 MG: 25 CAPSULE ORAL at 15:39

## 2023-11-19 RX ADMIN — INSULIN GLARGINE 11 UNITS: 100 INJECTION, SOLUTION SUBCUTANEOUS at 20:26

## 2023-11-19 RX ADMIN — ENOXAPARIN SODIUM 120 MG: 150 INJECTION SUBCUTANEOUS at 17:09

## 2023-11-19 RX ADMIN — PANTOPRAZOLE SODIUM 40 MG: 40 INJECTION, POWDER, FOR SOLUTION INTRAVENOUS at 08:23

## 2023-11-19 RX ADMIN — Medication 10 ML: at 08:25

## 2023-11-19 RX ADMIN — Medication 10 ML: at 20:28

## 2023-11-19 NOTE — PROGRESS NOTES
Name: Josiah Loomis ADMIT: 2023   : 1985  PCP: Kodi Rosario DO    MRN: [id number] LOS: 0 days   AGE/SEX: 38 y.o. male  ROOM: [facility]/     Subjective   Subjective     No events overnight. His CTA chest yesterday showed some evidence of pneumonia, but was negative for PE. He no longer has any pna symptoms though he says he did last week. Leukocytosis has resolved. He's on room air. Procal was normal. CTA chest couldn't rule out a small PE and a d-dimer is positive, so we're proceeding with lower extremity duplexes. Echo was also normal. EGD only showed some gastritis.       Objective   Objective   Vital Signs  Temp:  [98.2 °F (36.8 °C)-98.4 °F (36.9 °C)] 98.2 °F (36.8 °C)  Heart Rate:  [] 84  Resp:  [12-18] 12  BP: (132-143)/(78-87) 143/87  SpO2:  [98 %-100 %] 100 %  on  Flow (L/min):  [2] 2;   Device (Oxygen Therapy): room air  Body mass index is 47.74 kg/m².  Physical Exam  Constitutional:       General: He is not in acute distress.     Appearance: He is obese. He is not toxic-appearing.   Cardiovascular:      Rate and Rhythm: Normal rate and regular rhythm.      Heart sounds: Normal heart sounds.   Pulmonary:      Effort: Pulmonary effort is normal.      Breath sounds: Normal breath sounds.   Abdominal:      General: Bowel sounds are normal.      Palpations: Abdomen is soft.   Musculoskeletal:         General: No tenderness.      Right lower leg: No edema.      Left lower leg: No edema.   Neurological:      Mental Status: He is alert.   Psychiatric:         Mood and Affect: Mood normal.         Behavior: Behavior normal.         Results Review     I reviewed the patient's new clinical results.  Results from last 7 days   Lab Units 23  03023  0557 23  2323 23  0516   WBC 10*3/mm3 9.47 16.56* 22.63* 17.74*   HEMOGLOBIN g/dL 13.9 14.6 15.2 15.3   PLATELETS 10*3/mm3 279 323 370 278     Results from last 7 days   Lab Units 23  0305 23  0557 23  9505  11/17/23  0516   SODIUM mmol/L 138 140 138 137   POTASSIUM mmol/L 3.9 3.7 4.0 4.4   CHLORIDE mmol/L 102 103 104 104   CO2 mmol/L 30.0* 28.1 25.0 25.3   BUN mg/dL 9 13 15 10   CREATININE mg/dL 1.08 1.16 1.06 0.86   GLUCOSE mg/dL 96 151* 100* 205*   Estimated Creatinine Clearance: 148.5 mL/min (by C-G formula based on SCr of 1.08 mg/dL).  Results from last 7 days   Lab Units 11/19/23  0305 11/17/23 2323 11/17/23  0516 11/15/23  1349   ALBUMIN g/dL 3.3* 3.5 3.8 3.8   BILIRUBIN mg/dL 0.4 0.4 0.5 1.3*   ALK PHOS U/L 102 118* 131* 172*   AST (SGOT) U/L 19 18 16 45*   ALT (SGPT) U/L 28 33 35 64*     Results from last 7 days   Lab Units 11/19/23  0305 11/18/23  0557 11/17/23 2323 11/17/23  0516 11/16/23  0517 11/15/23  1349   CALCIUM mg/dL 8.8 8.8 9.2 9.4   < > 9.2   ALBUMIN g/dL 3.3*  --  3.5 3.8  --  3.8    < > = values in this interval not displayed.     Results from last 7 days   Lab Units 11/18/23  1614 11/17/23  2323 11/15/23  1349   PROCALCITONIN ng/mL 0.17  --  0.15   LACTATE mmol/L  --  1.4 0.9     COVID19   Date Value Ref Range Status   11/16/2023 Not Detected Not Detected - Ref. Range Final     Hemoglobin A1C   Date/Time Value Ref Range Status   11/19/2023 0305 7.10 (H) 4.80 - 5.60 % Final     Glucose   Date/Time Value Ref Range Status   11/19/2023 0615 110 70 - 130 mg/dL Final   11/18/2023 2123 109 70 - 130 mg/dL Final   11/18/2023 1613 161 (H) 70 - 130 mg/dL Final   11/18/2023 1139 110 70 - 130 mg/dL Final   11/18/2023 0540 141 (H) 70 - 130 mg/dL Final   11/17/2023 0610 203 (H) 70 - 130 mg/dL Final   11/16/2023 2142 162 (H) 70 - 130 mg/dL Final       Adult Transthoracic Echo Complete W/ Cont if Necessary Per Protocol    Left ventricular systolic function is normal. Calculated left   ventricular EF = 58.4%    Left ventricular diastolic function was normal.    Estimated right ventricular systolic pressure from tricuspid   regurgitation is normal (<35 mmHg).  CT Angiogram Chest  CT ANGIOGRAM CHEST-     Radiation  dose reduction techniques were utilized, including automated  exposure control and exposure modulation based on body size.     Clinical: Chest pain, evaluate for pulmonary embolus     CT scan of the chest performed with intravenous contrast, pulmonary  embolus protocol to include coronal, sagittal and three-dimensional  reconstruction.     FINDINGS:  1. Cardiac size within normal limits, no pericardial or esophageal  abnormality. Contour and caliber of the thoracic aorta within normal  limits, no dissection. Resolution of the pulmonary arterial tree is less  than optimal due to the patient's large body habitus. No gross pulmonary  embolus seen. Small pulmonary embolus not excluded.     2. Multiple small vague nodular foci of consolidation demonstrated  within the right upper lobe, the appearance most consistent with  atypical pneumonia. Nodular opacities are all subcentimeter in size. The  left lung is clear. No pleural effusion seen. Repeat CT chest  recommended in 6-8 weeks.     3. Limited images through the upper abdomen are unremarkable.                    This report was finalized on 11/18/2023 12:27 PM by Dr. Louie Billings M.D on Workstation: [email or web address]     CT Abdomen Pelvis With Contrast  Narrative: Patient: NORA STEWART  Time Out: 03:54  Exam(s): CT ABDOMEN + PELVIS With Contrast     EXAM:    CT Abdomen and Pelvis With Intravenous Contrast    CLINICAL HISTORY:     Reason for exam: abd pain.    TECHNIQUE:    Axial computed tomography images of the abdomen and pelvis with   intravenous contrast.  CTDI is 42.17 mGy and DLP is 2291 mGy-cm.  This CT   exam was performed according to the principle of ALARA (As Low As   Reasonably Achievable) by using one or more of the following dose   reduction techniques: automated exposure control, adjustment of the mA   and or kV according to patient size, and or use of iterative   reconstruction technique.    COMPARISON:    No relevant prior studies available.    FINDINGS:     Lung bases:  Unremarkable.  No mass.  No consolidation.     ABDOMEN:    Liver:  Unremarkable.  No mass.    Gallbladder and bile ducts:  Punctate cholelithiasis without   gallbladder distention.  No ductal dilation.    Pancreas:  Unremarkable.  No mass.  No ductal dilation.    Spleen:  Unremarkable.  No splenomegaly.    Adrenals:  Unremarkable.  No mass.    Kidneys and ureters:  Unremarkable.  No solid mass.  No hydronephrosis.    Stomach and bowel:  Unremarkable.  No obstruction.  No mucosal   thickening.     PELVIS:    Appendix:  No findings to suggest acute appendicitis.    Bladder:  Unremarkable.  No mass.    Reproductive:  Unremarkable as visualized.     ABDOMEN and PELVIS:    Intraperitoneal space:  Unremarkable.  No free air.  No significant   fluid collection.    Bones joints:  No acute fracture.  No dislocation.    Soft tissues:  Unremarkable.    Vasculature:  Unremarkable.  No abdominal aortic aneurysm.    Lymph nodes:  Unremarkable.  No enlarged lymph nodes.    IMPRESSION:         No acute findings in the abdomen or pelvis.  Impression: Electronically signed by Wilman Dang MD on 11-18-23 at 0354    Scheduled Medications  aluminum-magnesium hydroxide-simethicone, 15 mL, Oral, Once  azaTHIOprine, 50 mg, Oral, BID  dorzolamide (TRUSOPT) 2 % 1 drop, timolol (TIMOPTIC) 0.5 % 1 drop for Cosopt 22.3-6.8 mg/mL, , Both Eyes, BID  insulin glargine, 11 Units, Subcutaneous, Nightly  insulin lispro, 3-14 Units, Subcutaneous, 4x Daily AC & at Bedtime  latanoprost, 1 drop, Both Eyes, Nightly  [START ON 11/20/2023] pantoprazole, 40 mg, Oral, Q AM  prednisoLONE acetate, 1 drop, Both Eyes, Daily  predniSONE, 5 mg, Oral, Daily With Breakfast  rosuvastatin, 40 mg, Oral, Nightly  sodium chloride, 10 mL, Intravenous, Q12H    Infusions  lactated ringers, 30 mL/hr  sodium chloride, 30 mL/hr, Last Rate: 30 mL/hr (11/19/23 0938)    Diet  Diet: Regular/House Diet; Texture: Regular Texture (IDDSI 7); Fluid Consistency: Thin  (IDDSI 0)       Assessment/Plan     Active Hospital Problems    Diagnosis  POA    **Epigastric pain [R10.13]  Yes    Gastroesophageal reflux disease [K21.9]  Yes    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       38 y.o. male admitted with Epigastric pain.    Mr. Loomis is a 38 y.o. 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 He initially present to the ED with GI complains on 11/14/23 and was subsequently discharged after receiving zofran and a GI cocktail. He then returned on 11/15/23 with swelling in his lower lip, tingling/soreness in his throat, urticaria, and fever to 101.5. infectious workup was negative and fevers (as well as his other symptoms) resolved after a single dose of rocephin and iv steroids and so he was discharged. He was felt to have had an allergic reaction to zofran at the time. He represented this admission with epigastric and chest discomfort similar to his initial presentation on the 14th as well as nausea and vomiting and hiccups.     Chest pain/epigastric pain  trop negative x2, bnp negative, echocardiogram unremarkable. No EKG changes.   EGD only showed some gastritis and is on an oral ppi  CTA was negative for PE, though the image was limited due to his habitus and a small PE couldn't be ruled out. D-dimer is positive, so we're proceeding with lower extremity duplexes.   CTA chest did show some evidence of a right upper lobe pneumonia. He did have pneumonia symptoms last week with fever and leukocytosis and cough, but these have resolved and procal  is normal as well.  Leukocytosis-resolved. Seems likely at this point that was from a combination of steroids and pneumonia.  Nausea and vomiting-resolved  Type 2 diabetes-glucose at goal  Hypertension-not on medications at home. Bp is acceptable acutely. Wouldn't treat acutely given his acute illness  Hyperlipidemia-statin  Chronic panuveitis with subsequent glaucoma-on humira, azathioprine, and steroids for this as an outpatient. Humira held while hospitalized.  Pharmacy to dose Lovenox for DVT prophylaxis.  Full code.  Discussed with patient and nursing staff.  Anticipate discharge home tomorrow. If symptoms are controlled and duplex is negative      Mauricio Degroot MD  [facility]  11/19/23  12:28 EST    I wore protective equipment throughout this patient encounter including a face mask, gloves and protective eyewear.  Hand hygiene was performed before donning protective equipment and after removal when leaving the room.

## 2023-11-19 NOTE — ANESTHESIA PREPROCEDURE EVALUATION
Anesthesia Evaluation     Patient summary reviewed and Nursing notes reviewed   NPO Solid Status: > 8 hours             Airway   Mallampati: II  TM distance: >3 FB  Neck ROM: full  no difficulty expected  Dental - normal exam     Pulmonary - normal exam   Cardiovascular - normal exam    (+) hypertension, hyperlipidemia      Neuro/Psych  GI/Hepatic/Renal/Endo    (+) obesity, morbid obesity, GERD, hepatitis, liver disease, diabetes mellitus type 2 using insulin    Musculoskeletal     Abdominal  - normal exam   Substance History      OB/GYN          Other                    Anesthesia Plan    ASA 4     MAC     intravenous induction     Anesthetic plan, risks, benefits, and alternatives have been provided, discussed and informed consent has been obtained with: patient.    CODE STATUS:    Code Status (Patient has no pulse and is not breathing): CPR (Attempt to Resuscitate)  Medical Interventions (Patient has pulse or is breathing): Full Support

## 2023-11-19 NOTE — PLAN OF CARE
Problem: Adult Inpatient Plan of Care  Goal: Plan of Care Review  Outcome: Ongoing, Progressing  Flowsheets (Taken 11/19/2023 0349)  Progress: no change  Plan of Care Reviewed With: patient  Outcome Evaluation: NPO since midnight. EGD possible today. 2LNC  Goal: Patient-Specific Goal (Individualized)  Outcome: Ongoing, Progressing  Goal: Absence of Hospital-Acquired Illness or Injury  Outcome: Ongoing, Progressing  Intervention: Identify and Manage Fall Risk  Recent Flowsheet Documentation  Taken 11/19/2023 0105 by Perri Valderrama RN  Safety Promotion/Fall Prevention: safety round/check completed  Taken 11/18/2023 2010 by Preri Valderrama RN  Safety Promotion/Fall Prevention: safety round/check completed  Intervention: Prevent Skin Injury  Recent Flowsheet Documentation  Taken 11/19/2023 0105 by Perri Valderrama RN  Body Position: position changed independently  Taken 11/18/2023 2010 by Perri Valderrama RN  Body Position:   position changed independently   side-lying   right   head facing, right  Intervention: Prevent and Manage VTE (Venous Thromboembolism) Risk  Recent Flowsheet Documentation  Taken 11/19/2023 0105 by Perri Valderrama RN  Activity Management: activity encouraged  VTE Prevention/Management: other (see comments)  Taken 11/18/2023 2010 by Perri Valderrama RN  Activity Management: up ad lin  VTE Prevention/Management: sequential compression devices off  Intervention: Prevent Infection  Recent Flowsheet Documentation  Taken 11/18/2023 2010 by Perri Valderrama RN  Infection Prevention: hand hygiene promoted  Goal: Optimal Comfort and Wellbeing  Outcome: Ongoing, Progressing  Intervention: Monitor Pain and Promote Comfort  Recent Flowsheet Documentation  Taken 11/18/2023 2010 by Perri Valderrama RN  Pain Management Interventions: pain management plan reviewed with patient/caregiver  Goal: Readiness for Transition of Care  Outcome: Ongoing, Progressing   Goal Outcome Evaluation:  Plan of Care Reviewed With: patient         Progress: no change  Outcome Evaluation: NPO since midnight. EGD possible today. 2LNC

## 2023-11-19 NOTE — PROGRESS NOTES
"[facility] Clinical Pharmacy Services: Enoxaparin Consult    Josiah Loomis has a pharmacy consult to dose prophylactic enoxaparin per Dr. JENISE Degroot's request.     Indication: VTE Prophylaxis       Relevant clinical data and objective history reviewed:  38 y.o. male 182.9 cm (72\") (!) 160 kg (352 lb)   Body mass index is 47.74 kg/m².   Results from last 7 days   Lab Units 11/19/23  0305   PLATELETS 10*3/mm3 279     Estimated Creatinine Clearance: 148.5 mL/min (by C-G formula based on SCr of 1.08 mg/dL).    Assessment/Plan    Will start patient on 40mg subcutaneous every 12 hours, adjusted for renal function and bmi > 40 Consult order will be discontinued but pharmacy will continue to follow.     Karina Hurley, [facility]  Clinical Pharmacist    "

## 2023-11-19 NOTE — PLAN OF CARE
Goal Outcome Evaluation:  Plan of Care Reviewed With: patient        Progress: improving  Outcome Evaluation: EGD today resulted in gastritis, venus duplex positive for acute left vein thrombosis, weight based lovenox started. VSS

## 2023-11-19 NOTE — NURSING NOTE
Paged [facility] regarding lab result elevated D-Dimer @1932. Awaiting call back. Nancy COLON returned call. Notified of result and CT chest negative for acute PE.

## 2023-11-19 NOTE — ANESTHESIA POSTPROCEDURE EVALUATION
"Patient: Josiah Loomis    Procedure Summary       Date: 11/19/23 Room / Location:  [facility]    Anesthesia Start: 1058 Anesthesia Stop: 1112    Procedure: ESOPHAGOGASTRODUODENOSCOPY with cold biopsies (Esophagus) Diagnosis:       Epigastric pain      (Epigastric pain [R10.13])    Surgeons: Dwight Witt MD Provider: Amadeo Herrera MD    Anesthesia Type: MAC ASA Status: 4            Anesthesia Type: MAC    Vitals  Vitals Value Taken Time   /82 11/19/23 1112   Temp     Pulse 95 11/19/23 1112   Resp 12 11/19/23 1112   SpO2 99 % 11/19/23 1112           Post Anesthesia Care and Evaluation    Patient location during evaluation: bedside  Patient participation: complete - patient participated  Level of consciousness: awake and alert  Pain management: adequate    Airway patency: patent  Anesthetic complications: No anesthetic complications    Cardiovascular status: acceptable  Respiratory status: acceptable  Hydration status: acceptable    Comments: /82 (BP Location: Left arm, Patient Position: Lying)   Pulse 95   Temp 36.8 °C (98.2 °F) (Oral)   Resp 12   Ht 182.9 cm (72\")   Wt (!) 160 kg (352 lb)   SpO2 99%   BMI 47.74 kg/m²     "

## 2023-11-19 NOTE — OUTREACH NOTE
Call Center TCM Note      Flowsheet Row Responses   [facility] patient discharged from? [place]   Does the patient have one of the following disease processes/diagnoses(primary or secondary)? Other   TCM attempt successful? No   Unsuccessful attempts Attempt 1   Change in Health Status Readmitted            Yola Potter RN    11/19/2023, 07:23 EST

## 2023-11-20 ENCOUNTER — READMISSION MANAGEMENT (OUTPATIENT)
Dept: CALL CENTER | Facility: HOSPITAL | Age: 38
End: 2023-11-20
Payer: COMMERCIAL

## 2023-11-20 VITALS
HEART RATE: 103 BPM | BODY MASS INDEX: 42.66 KG/M2 | WEIGHT: 315 LBS | TEMPERATURE: 99.3 F | HEIGHT: 72 IN | RESPIRATION RATE: 16 BRPM | SYSTOLIC BLOOD PRESSURE: 125 MMHG | OXYGEN SATURATION: 100 % | DIASTOLIC BLOOD PRESSURE: 76 MMHG

## 2023-11-20 PROBLEM — I82.409 ACUTE DVT (DEEP VENOUS THROMBOSIS): Status: ACTIVE | Noted: 2023-11-20

## 2023-11-20 PROBLEM — R10.13 EPIGASTRIC PAIN: Status: RESOLVED | Noted: 2023-11-18 | Resolved: 2023-11-20

## 2023-11-20 LAB
BACTERIA SPEC AEROBE CULT: NORMAL
BACTERIA SPEC AEROBE CULT: NORMAL
GLUCOSE BLDC GLUCOMTR-MCNC: 106 MG/DL (ref 70–130)
GLUCOSE BLDC GLUCOMTR-MCNC: 123 MG/DL (ref 70–130)

## 2023-11-20 PROCEDURE — 96372 THER/PROPH/DIAG INJ SC/IM: CPT

## 2023-11-20 PROCEDURE — 63710000001 AZATHIOPRINE PER 50 MG: Performed by: STUDENT IN AN ORGANIZED HEALTH CARE EDUCATION/TRAINING PROGRAM

## 2023-11-20 PROCEDURE — 63710000001 PREDNISONE PER 5 MG: Performed by: STUDENT IN AN ORGANIZED HEALTH CARE EDUCATION/TRAINING PROGRAM

## 2023-11-20 PROCEDURE — 25010000002 ENOXAPARIN PER 10 MG: Performed by: STUDENT IN AN ORGANIZED HEALTH CARE EDUCATION/TRAINING PROGRAM

## 2023-11-20 PROCEDURE — 82948 REAGENT STRIP/BLOOD GLUCOSE: CPT

## 2023-11-20 PROCEDURE — G0378 HOSPITAL OBSERVATION PER HR: HCPCS

## 2023-11-20 RX ORDER — PANTOPRAZOLE SODIUM 40 MG/1
40 TABLET, DELAYED RELEASE ORAL
Qty: 30 TABLET | Refills: 0 | Status: SHIPPED | OUTPATIENT
Start: 2023-11-21

## 2023-11-20 RX ADMIN — PREDNISOLONE ACETATE 1 DROP: 10 SUSPENSION/ DROPS OPHTHALMIC at 08:40

## 2023-11-20 RX ADMIN — PREDNISONE 5 MG: 5 TABLET ORAL at 08:39

## 2023-11-20 RX ADMIN — AZATHIOPRINE 50 MG: 50 TABLET ORAL at 08:39

## 2023-11-20 RX ADMIN — TIMOLOL MALEATE: 5 SOLUTION OPHTHALMIC at 08:39

## 2023-11-20 RX ADMIN — Medication 10 ML: at 08:40

## 2023-11-20 RX ADMIN — PANTOPRAZOLE SODIUM 40 MG: 40 TABLET, DELAYED RELEASE ORAL at 06:35

## 2023-11-20 RX ADMIN — ENOXAPARIN SODIUM 160 MG: 100 INJECTION SUBCUTANEOUS at 06:35

## 2023-11-20 NOTE — OUTREACH NOTE
Prep Survey      Flowsheet Row Responses   [facility] patient discharged from? [place]   Is LACE score < 7 ? No   Eligibility [facility]   Date of Admission 11/17/23   Date of Discharge 11/20/23   Discharge Disposition Home or Self Care   Discharge diagnosis Acute DVT   Does the patient have one of the following disease processes/diagnoses(primary or secondary)? Other   Does the patient have Home health ordered? No   Is there a DME ordered? No   Prep survey completed? Yes            Kristy AGUILA - Registered Nurse

## 2023-11-20 NOTE — CASE MANAGEMENT/SOCIAL WORK
Case Management Discharge Note      Final Note: Home, no additional CCP needs.         Selected Continued Care - Admitted Since 11/17/2023       Destination    No services have been selected for the patient.                Durable Medical Equipment    No services have been selected for the patient.                Dialysis/Infusion    No services have been selected for the patient.                Home Medical Care    No services have been selected for the patient.                Therapy    No services have been selected for the patient.                Community Resources    No services have been selected for the patient.                Community & DME    No services have been selected for the patient.                         Final Discharge Disposition Code: 01 - home or self-care

## 2023-11-20 NOTE — PLAN OF CARE
Problem: Adult Inpatient Plan of Care  Goal: Plan of Care Review  Outcome: Met  Flowsheets (Taken 11/20/2023 1758)  Progress: improving  Plan of Care Reviewed With:   patient   spouse  Goal: Patient-Specific Goal (Individualized)  Outcome: Met  Goal: Absence of Hospital-Acquired Illness or Injury  Outcome: Met  Goal: Optimal Comfort and Wellbeing  Outcome: Met  Goal: Readiness for Transition of Care  Outcome: Met     Problem: Adjustment to Illness (Sepsis/Septic Shock)  Goal: Optimal Coping  Outcome: Met     Problem: Bleeding (Sepsis/Septic Shock)  Goal: Absence of Bleeding  Outcome: Met     Problem: Glycemic Control Impaired (Sepsis/Septic Shock)  Goal: Blood Glucose Level Within Desired Range  Outcome: Met     Problem: Infection Progression (Sepsis/Septic Shock)  Goal: Absence of Infection Signs and Symptoms  Outcome: Met     Problem: Nutrition Impaired (Sepsis/Septic Shock)  Goal: Optimal Nutrition Intake  Outcome: Met   Goal Outcome Evaluation:  Plan of Care Reviewed With: patient, spouse        Progress: improving  Outcome Evaluation: Pt AxOx4, medications given as scheduled, pain still c/o of pain through out shift , 2L NC, VSS

## 2023-11-21 ENCOUNTER — TRANSITIONAL CARE MANAGEMENT TELEPHONE ENCOUNTER (OUTPATIENT)
Dept: CALL CENTER | Facility: HOSPITAL | Age: 38
End: 2023-11-21
Payer: COMMERCIAL

## 2023-11-21 NOTE — OUTREACH NOTE
Call Center TCM Note      Flowsheet Row Responses   [facility] patient discharged from? [place]   Does the patient have one of the following disease processes/diagnoses(primary or secondary)? Other   TCM attempt successful? No   Unsuccessful attempts Attempt 2  [No updated verbal release on file from PCP group]            Brea Rivera RN    11/21/2023, 10:25 EST

## 2023-11-22 ENCOUNTER — TRANSITIONAL CARE MANAGEMENT TELEPHONE ENCOUNTER (OUTPATIENT)
Dept: CALL CENTER | Facility: HOSPITAL | Age: 38
End: 2023-11-22
Payer: COMMERCIAL

## 2023-11-22 NOTE — OUTREACH NOTE
Call Center TCM Note      Flowsheet Row Responses   [facility] patient discharged from? [place]   Does the patient have one of the following disease processes/diagnoses(primary or secondary)? Other   TCM attempt successful? No  [No VR from PCP]   Unsuccessful attempts Attempt 3            Nely Marrero RN    11/22/2023, 08:26 EST

## 2023-11-23 LAB
LAB AP CASE REPORT: NORMAL
PATH REPORT.FINAL DX SPEC: NORMAL
PATH REPORT.GROSS SPEC: NORMAL

## 2023-11-29 ENCOUNTER — OFFICE VISIT (OUTPATIENT)
Age: 38
End: 2023-11-29
Payer: COMMERCIAL

## 2023-11-29 VITALS — WEIGHT: 315 LBS | BODY MASS INDEX: 40.43 KG/M2 | HEIGHT: 74 IN

## 2023-11-29 DIAGNOSIS — R07.2 PRECORDIAL PAIN: Primary | ICD-10-CM

## 2023-11-29 PROCEDURE — 93000 ELECTROCARDIOGRAM COMPLETE: CPT | Performed by: INTERNAL MEDICINE

## 2023-11-29 PROCEDURE — 99204 OFFICE O/P NEW MOD 45 MIN: CPT | Performed by: INTERNAL MEDICINE

## 2023-11-29 RX ORDER — DORZOLAMIDE HYDROCHLORIDE AND TIMOLOL MALEATE 20; 5 MG/ML; MG/ML
1 SOLUTION/ DROPS OPHTHALMIC 2 TIMES DAILY
COMMUNITY
Start: 2023-09-22

## 2023-12-05 ENCOUNTER — OFFICE VISIT (OUTPATIENT)
Dept: FAMILY MEDICINE CLINIC | Facility: CLINIC | Age: 38
End: 2023-12-05
Payer: COMMERCIAL

## 2023-12-05 VITALS — OXYGEN SATURATION: 95 % | BODY MASS INDEX: 40.43 KG/M2 | HEIGHT: 74 IN | HEART RATE: 87 BPM | WEIGHT: 315 LBS

## 2023-12-05 DIAGNOSIS — Z79.4 TYPE 2 DIABETES MELLITUS WITH HYPERGLYCEMIA, WITH LONG-TERM CURRENT USE OF INSULIN: ICD-10-CM

## 2023-12-05 DIAGNOSIS — I82.4Y2 ACUTE DEEP VEIN THROMBOSIS (DVT) OF PROXIMAL VEIN OF LEFT LOWER EXTREMITY: ICD-10-CM

## 2023-12-05 DIAGNOSIS — H20.9 UVEITIS: ICD-10-CM

## 2023-12-05 DIAGNOSIS — E78.5 DYSLIPIDEMIA: ICD-10-CM

## 2023-12-05 DIAGNOSIS — E11.65 TYPE 2 DIABETES MELLITUS WITH HYPERGLYCEMIA, WITH LONG-TERM CURRENT USE OF INSULIN: ICD-10-CM

## 2023-12-05 DIAGNOSIS — Z79.4 TYPE 2 DIABETES MELLITUS WITH OTHER OPHTHALMIC COMPLICATION, WITH LONG-TERM CURRENT USE OF INSULIN: Primary | ICD-10-CM

## 2023-12-05 DIAGNOSIS — K21.9 GASTROESOPHAGEAL REFLUX DISEASE WITHOUT ESOPHAGITIS: ICD-10-CM

## 2023-12-05 DIAGNOSIS — E11.39 TYPE 2 DIABETES MELLITUS WITH OTHER OPHTHALMIC COMPLICATION, WITH LONG-TERM CURRENT USE OF INSULIN: Primary | ICD-10-CM

## 2023-12-05 DIAGNOSIS — I10 ESSENTIAL HYPERTENSION: ICD-10-CM

## 2023-12-05 PROCEDURE — 99214 OFFICE O/P EST MOD 30 MIN: CPT | Performed by: FAMILY MEDICINE

## 2023-12-05 RX ORDER — BLOOD-GLUCOSE SENSOR
1 EACH MISCELLANEOUS
Qty: 6 EACH | Refills: 3 | Status: SHIPPED | OUTPATIENT
Start: 2023-12-05

## 2023-12-05 RX ORDER — FAMOTIDINE 40 MG/1
40 TABLET, FILM COATED ORAL 2 TIMES DAILY PRN
Qty: 180 TABLET | Refills: 3 | Status: SHIPPED | OUTPATIENT
Start: 2023-12-05

## 2023-12-05 NOTE — PROGRESS NOTES
Subjective   Josiah Loomis is a 38 y.o. male. Presents today for   Chief Complaint   Patient presents with    Diabetes    Hypertension    Hyperlipidemia       Discharge Diagnoses            Active Hospital Problems     Diagnosis   POA    **Acute DVT (deep venous thrombosis) [I82.409]   Yes    Gastroesophageal reflux disease [K21.9]   Yes    Panuveitis of both eyes [H44.113]   Yes    Type 2 diabetes mellitus without complication, with long-term current use of insulin [E11.9, Z79.4]   Not Applicable    Morbidly obese [E66.01]   Yes    Dyslipidemia [E78.5]   Yes    Essential hypertension [I10]   Yes       Resolved Hospital Problems     Diagnosis Date Resolved POA    Epigastric pain [R10.13] 11/20/2023 Yes         Hospital Course      Mr. Loomis is a 38 y.o. 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 He initially present to the ED with GI complains on 11/14/23 and was subsequently discharged after receiving zofran and a GI cocktail. He then returned on 11/15/23 with swelling in his lower lip, tingling/soreness in his throat, urticaria, and fever to 101.5. infectious workup was negative and fevers (as well as his other symptoms) resolved after a single dose of rocephin and iv steroids and so he was discharged. He was felt to have had an allergic reaction to zofran at the time. He represented this admission with epigastric and chest discomfort similar to his initial presentation on the 14th as well as nausea and vomiting and hiccups and so was admitted for further evaluation and treatment.     A CT of his abdomen pelvis was negative, and troponins were negative x2, and BNP was negative, and there were no EKG changes.  His symptoms sounded gastrointestinal in nature, and so he was started on IV PPI twice daily, and gastroenterology was consulted,  and they took him for an EGD which only showed some gastritis, and so his IV PPI was transitioned to oral PPI.  He was moderate risk for pulmonary embolism and was tachycardic, and so he underwent CTA chest which was negative for pulmonary embolism, but the evaluation was limited due to his habitus, and so a small PE could not be ruled out.  A D-dimer was subsequently checked and this was positive, and so he underwent lower extremity Dopplers which did show an acute left lower extremity DVT.  He was subsequently started on therapeutic Lovenox and then transition to oral Eliquis which she will need to continue for 3 to 6 months, and possibly indefinitely given his probable autoimmune disease and weight.  A referral to hematology has been placed in epic.     Of note, his chest CT also showed evidence of a right upper lobe pneumonia.  The patient reports that last week he was having cough in addition to his known leukocytosis and fever, but the symptoms have all resolved as is the leukocytosis, and his procalcitonin was negative.  I do not feel that there is any indication for antibiotics at this time, but I did recommend to him that he get a repeat chest CT in 2 to 3 months.       He will also need to follow-up with gastroenterology as an outpatient as his symptoms really do sound more gastrointestinal in nature and he did not have any pleuritic chest pain, and so I wonder if the acute DVT is an incidental finding as opposed to evidence of a pulmonary embolism.  Diabetes  He presents for his follow-up diabetic visit. He has type 2 diabetes mellitus. His disease course has been stable. Pertinent negatives for diabetes include no chest pain and no foot paresthesias. Symptoms are stable.   Hypertension  This is a chronic problem. The current episode started more than 1 year ago. The problem is unchanged. The problem is controlled. Pertinent negatives include no chest pain, orthopnea, palpitations, peripheral edema, PND  or shortness of breath. The current treatment provides moderate improvement.   Hyperlipidemia  This is a chronic problem. The problem is controlled. Recent lipid tests were reviewed and are normal. Exacerbating diseases include diabetes and obesity. Pertinent negatives include no chest pain or shortness of breath. Current antihyperlipidemic treatment includes statins. The current treatment provides moderate improvement of lipids.   Patient a lot of GI issues;  on pantoprazole and pepcid, still throwing up and relates imuran worsened the most though is on GLP1 and reports doing fine until added med for uveitis.    Is losing weight on glp1;       Prior to hospitalization did not need any sliding scale insulin for 1 month;   reports higher while inpatient;   Had acute dvt, on eliquis this doing better;     Review of Systems   Respiratory:  Negative for shortness of breath.    Cardiovascular:  Negative for chest pain, palpitations, orthopnea and PND.   Gastrointestinal:  Positive for nausea. Negative for abdominal pain and vomiting.       Patient Active Problem List   Diagnosis    Sprain of acromioclavicular ligament    Atopic dermatitis    Essential hypertension    Dyslipidemia    Abdominal hernia    Morbidly obese    Type 2 diabetes mellitus without complication, with long-term current use of insulin    Hyperlipidemia    Abnormal liver function tests    Autoimmune hepatitis    Cataract extraction status of left eye    Cataract of right eye    Cystoid macular edema    Diabetes mellitus    Disorder due to insertion of glaucoma drainage device    Gastroesophageal reflux disease    High gamma glutamyl transferase (GGT)    Other specified postprocedural states    Other states following surgery of eye and adnexa    Pseudophakia of left eye    Panuveitis of both eyes    Sarcoidosis    Uveitic glaucoma    Uveitis    Exotropia of right eye    Fever    Allergic reaction caused by a drug    Acute DVT (deep venous thrombosis)        Social History     Socioeconomic History    Marital status:    Tobacco Use    Smoking status: Never     Passive exposure: Never    Smokeless tobacco: Never   Vaping Use    Vaping Use: Some days    Substances: THC    Devices: Pre-filled or refillable cartridge   Substance and Sexual Activity    Alcohol use: Not Currently     Comment: rarely    Drug use: Yes     Frequency: 1.0 times per week     Types: Marijuana     Comment: smokes daily per pt    Sexual activity: Defer       Allergies   Allergen Reactions    Zofran [Ondansetron] Itching     IV form only       Current Outpatient Medications on File Prior to Visit   Medication Sig Dispense Refill    Adalimumab (Humira) 40 MG/0.4ML Prefilled Syringe Kit injection Inject 0.4 mL under the skin into the appropriate area as directed 1 (One) Time.      apixaban (ELIQUIS) 5 MG tablet tablet Take 1 tablet by mouth 2 (Two) Times a Day. 60 tablet 4    azaTHIOprine (IMURAN) 50 MG tablet Take 1 tablet by mouth 2 (Two) Times a Day.      diphenhydrAMINE (BENADRYL) 50 MG capsule Take 1 capsule by mouth Every 6 (Six) Hours As Needed for Itching.      dorzolamide-timolol (COSOPT) 2-0.5 % ophthalmic solution Administer 1 drop to both eyes 2 (Two) Times a Day.      glucose blood (Accu-Chek Guide) test strip 1 each by Other route 4 (Four) Times a Day. Use as instructed. Dx. E11.9 400 each 12    insulin aspart (NovoLOG FlexPen ReliOn) 100 UNIT/ML solution pen-injector sc pen INJECT UP TO 36 UNITS DAILY VIA SLIDING SCALE(3U)150-200, 6U(201-250), 9U(251-300), 12 USE(301-351), 33 mL 3    insulin degludec (Tresiba FlexTouch) 100 UNIT/ML solution pen-injector injection Inject 11 Units under the skin into the appropriate area as directed Every Night. 15 mL 5    Insulin Pen Needle (BD Pen Needle Dory 2nd Gen) 32G X 4 MM misc USE AS DIRECTED FOUR TIMES DAILY 400 each 5    Lancets 30G misc 1 Device 4 (Four) Times a Day. Dx. E11.9 400 each 12    ondansetron (ZOFRAN) 4 MG tablet Take 1-2  "tablets by mouth.      pantoprazole (PROTONIX) 40 MG EC tablet Take 1 tablet by mouth Every Morning. 30 tablet 0    prednisoLONE acetate (PRED FORTE) 1 % ophthalmic suspension Administer 1 drop to both eyes Daily.      predniSONE (DELTASONE) 5 MG tablet Take 1 tablet by mouth Daily.      rosuvastatin (CRESTOR) 40 MG tablet TAKE 1 TABLET BY MOUTH ONCE DAILY AT NIGHT 90 tablet 0    Semaglutide, 1 MG/DOSE, (OZEMPIC) 4 MG/3ML solution pen-injector Inject 1 mg under the skin into the appropriate area as directed 1 (One) Time Per Week. 3 mL 5    [DISCONTINUED] Continuous Blood Gluc Sensor (FreeStyle Checo 3 Sensor) misc 1 each Every 14 (Fourteen) Days. 6 each 3    [DISCONTINUED] famotidine (PEPCID) 40 MG tablet TAKE 1 TABLET BY MOUTH ONCE DAILY AT NIGHT 90 tablet 0     No current facility-administered medications on file prior to visit.       Objective   Vitals:    12/05/23 1019   Pulse: 87   SpO2: 95%   Weight: (!) 155 kg (341 lb)   Height: 188 cm (74\")     Body mass index is 43.78 kg/m².    Physical Exam  Vitals and nursing note reviewed.   Constitutional:       Appearance: He is well-developed.   HENT:      Head: Normocephalic and atraumatic.   Neck:      Thyroid: No thyromegaly.      Vascular: No JVD.   Cardiovascular:      Rate and Rhythm: Normal rate and regular rhythm.      Heart sounds: Normal heart sounds. No murmur heard.     No friction rub. No gallop.   Pulmonary:      Effort: Pulmonary effort is normal. No respiratory distress.      Breath sounds: Normal breath sounds. No wheezing or rales.   Abdominal:      General: Bowel sounds are normal. There is no distension.      Palpations: Abdomen is soft.      Tenderness: There is no abdominal tenderness. There is no guarding or rebound.   Musculoskeletal:      Cervical back: Neck supple.   Skin:     General: Skin is warm and dry.   Neurological:      Mental Status: He is alert.   Psychiatric:         Behavior: Behavior normal.       Component      Latest Ref Rng " 11/19/2023   Glucose      65 - 99 mg/dL 96    BUN      6 - 20 mg/dL 9    Creatinine      0.76 - 1.27 mg/dL 1.08    Sodium      136 - 145 mmol/L 138    Potassium      3.5 - 5.2 mmol/L 3.9    Chloride      98 - 107 mmol/L 102    CO2      22.0 - 29.0 mmol/L 30.0 (H)    Calcium      8.6 - 10.5 mg/dL 8.8    Total Protein      6.0 - 8.5 g/dL 5.8 (L)    Albumin      3.5 - 5.2 g/dL 3.3 (L)    ALT (SGPT)      1 - 41 U/L 28    AST (SGOT)      1 - 40 U/L 19    Alkaline Phosphatase      39 - 117 U/L 102    Total Bilirubin      0.0 - 1.2 mg/dL 0.4    Globulin      gm/dL 2.5    A/G Ratio      g/dL 1.3    BUN/Creatinine Ratio      7.0 - 25.0  8.3    Anion Gap      5.0 - 15.0 mmol/L 6.0    eGFR      >60.0 mL/min/1.73 91.2    Hemoglobin A1C      4.80 - 5.60 % 7.10 (H)       Legend:  (H) High  (L) Low  Assessment & Plan   Diagnoses and all orders for this visit:    1. Type 2 diabetes mellitus without complication, with long-term current use of insulin (Primary)    2. Essential hypertension    3. Dyslipidemia    4. Gastroesophageal reflux disease without esophagitis  -     famotidine (PEPCID) 40 MG tablet; Take 1 tablet by mouth 2 (Two) Times a Day As Needed for Heartburn (nausea).  Dispense: 180 tablet; Refill: 3    5. Type 2 diabetes mellitus with hyperglycemia, with long-term current use of insulin  -     Continuous Blood Gluc Sensor (FreeStyle Checo 3 Sensor) misc; Use 1 each Every 14 (Fourteen) Days.  Dispense: 6 each; Refill: 3    Dm2 - doing better, on insulin and would need tighter control recommend cgm, will send again;  reports has active medicaid that will cover.   Ok pepcid bid for now to hlep tolerate  -hypertension - controlled, continue medications  -HLD - continue statin, recheck lipids next ov                 -Follow up: 3 montsh and prn

## 2023-12-26 DIAGNOSIS — E78.5 DYSLIPIDEMIA: ICD-10-CM

## 2023-12-26 RX ORDER — ROSUVASTATIN CALCIUM 40 MG/1
TABLET, COATED ORAL
Qty: 90 TABLET | Refills: 3 | Status: SHIPPED | OUTPATIENT
Start: 2023-12-26

## 2023-12-29 RX ORDER — PANTOPRAZOLE SODIUM 40 MG/1
40 TABLET, DELAYED RELEASE ORAL
Qty: 90 TABLET | Refills: 3 | Status: SHIPPED | OUTPATIENT
Start: 2023-12-29

## 2023-12-29 NOTE — TELEPHONE ENCOUNTER
Caller: Josiah Loomis    Relationship: Self    Best call back number: [phone]    Requested Prescriptions:   Requested Prescriptions     Pending Prescriptions Disp Refills    pantoprazole (PROTONIX) 40 MG EC tablet 30 tablet 0     Sig: Take 1 tablet by mouth Every Morning.        Pharmacy where request should be sent: [facility] [id number] [place] [id number] - [phone] PH - [phone] FX     Last office visit with prescribing clinician: 12/5/2023   Last telemedicine visit with prescribing clinician: Visit date not found   Next office visit with prescribing clinician: 2/6/2024     Additional details provided by patient: OUT OF MEDICATION     Does the patient have less than a 3 day supply:  [x] Yes  [] No      Sabina Arnold Rep   12/29/23 12:34 EST

## 2024-01-12 DIAGNOSIS — Z79.4 TYPE 2 DIABETES MELLITUS WITH HYPERGLYCEMIA, WITH LONG-TERM CURRENT USE OF INSULIN: ICD-10-CM

## 2024-01-12 DIAGNOSIS — E11.65 TYPE 2 DIABETES MELLITUS WITH HYPERGLYCEMIA, WITH LONG-TERM CURRENT USE OF INSULIN: ICD-10-CM

## 2024-01-12 RX ORDER — BLOOD SUGAR DIAGNOSTIC
1 STRIP MISCELLANEOUS 4 TIMES DAILY
Qty: 400 EACH | Refills: 12 | Status: SHIPPED | OUTPATIENT
Start: 2024-01-12

## 2024-01-12 NOTE — TELEPHONE ENCOUNTER
Caller: Josiah Loomis    Relationship: Self    Best call back number: [phone]    Requested Prescriptions:   Requested Prescriptions     Pending Prescriptions Disp Refills    glucose blood (Accu-Chek Guide) test strip 400 each 12     Si each by Other route 4 (Four) Times a Day. Use as instructed. Dx. E11.9    Lancets 30G misc 400 each 12     Sig: Use 1 Device 4 (Four) Times a Day. Dx. E11.9        Pharmacy where request should be sent: [facility] [id number] - [phone]  - [phone] FX     Last office visit with prescribing clinician: 2023   Last telemedicine visit with prescribing clinician: Visit date not found   Next office visit with prescribing clinician: 2024     Additional details provided by patient: OUT OF THESE    Does the patient have less than a 3 day supply:  [x] Yes  [] No    Would you like a call back once the refill request has been completed: [] Yes [x] No    If the office needs to give you a call back, can they leave a voicemail: [] Yes [x] No    Sabina Morelos Rep   24 12:22 EST

## 2024-01-18 ENCOUNTER — APPOINTMENT (OUTPATIENT)
Dept: LAB | Facility: HOSPITAL | Age: 39
End: 2024-01-18
Payer: COMMERCIAL

## 2024-01-18 ENCOUNTER — TELEPHONE (OUTPATIENT)
Dept: FAMILY MEDICINE CLINIC | Facility: CLINIC | Age: 39
End: 2024-01-18

## 2024-01-18 ENCOUNTER — CONSULT (OUTPATIENT)
Dept: ONCOLOGY | Facility: CLINIC | Age: 39
End: 2024-01-18
Payer: COMMERCIAL

## 2024-01-18 VITALS
OXYGEN SATURATION: 98 % | TEMPERATURE: 98.5 F | DIASTOLIC BLOOD PRESSURE: 100 MMHG | SYSTOLIC BLOOD PRESSURE: 145 MMHG | WEIGHT: 315 LBS | BODY MASS INDEX: 40.43 KG/M2 | HEIGHT: 74 IN | HEART RATE: 80 BPM | RESPIRATION RATE: 16 BRPM

## 2024-01-18 DIAGNOSIS — R91.8 LUNG NODULES: ICD-10-CM

## 2024-01-18 DIAGNOSIS — I82.402 ACUTE DEEP VEIN THROMBOSIS (DVT) OF LEFT LOWER EXTREMITY, UNSPECIFIED VEIN: Primary | ICD-10-CM

## 2024-01-18 DIAGNOSIS — D75.1 SECONDARY POLYCYTHEMIA: ICD-10-CM

## 2024-01-18 LAB
BASOPHILS # BLD AUTO: 0.07 10*3/MM3 (ref 0–0.2)
BASOPHILS NFR BLD AUTO: 0.8 % (ref 0–1.5)
DEPRECATED RDW RBC AUTO: 38 FL (ref 37–54)
EOSINOPHIL # BLD AUTO: 0.28 10*3/MM3 (ref 0–0.4)
EOSINOPHIL NFR BLD AUTO: 3.3 % (ref 0.3–6.2)
ERYTHROCYTE [DISTWIDTH] IN BLOOD BY AUTOMATED COUNT: 13.4 % (ref 12.3–15.4)
HCT VFR BLD AUTO: 48.6 % (ref 37.5–51)
HGB BLD-MCNC: 16.3 G/DL (ref 13–17.7)
IMM GRANULOCYTES # BLD AUTO: 0.04 10*3/MM3 (ref 0–0.05)
IMM GRANULOCYTES NFR BLD AUTO: 0.5 % (ref 0–0.5)
LYMPHOCYTES # BLD AUTO: 2.13 10*3/MM3 (ref 0.7–3.1)
LYMPHOCYTES NFR BLD AUTO: 25 % (ref 19.6–45.3)
MCH RBC QN AUTO: 26.6 PG (ref 26.6–33)
MCHC RBC AUTO-ENTMCNC: 33.5 G/DL (ref 31.5–35.7)
MCV RBC AUTO: 79.3 FL (ref 79–97)
MONOCYTES # BLD AUTO: 0.69 10*3/MM3 (ref 0.1–0.9)
MONOCYTES NFR BLD AUTO: 8.1 % (ref 5–12)
NEUTROPHILS NFR BLD AUTO: 5.32 10*3/MM3 (ref 1.7–7)
NEUTROPHILS NFR BLD AUTO: 62.3 % (ref 42.7–76)
NRBC BLD AUTO-RTO: 0 /100 WBC (ref 0–0.2)
PLATELET # BLD AUTO: 301 10*3/MM3 (ref 140–450)
PMV BLD AUTO: 10 FL (ref 6–12)
RBC # BLD AUTO: 6.13 10*6/MM3 (ref 4.14–5.8)
WBC NRBC COR # BLD AUTO: 8.53 10*3/MM3 (ref 3.4–10.8)

## 2024-01-18 PROCEDURE — 36415 COLL VENOUS BLD VENIPUNCTURE: CPT | Performed by: INTERNAL MEDICINE

## 2024-01-18 PROCEDURE — 85025 COMPLETE CBC W/AUTO DIFF WBC: CPT | Performed by: INTERNAL MEDICINE

## 2024-01-18 RX ORDER — BRINZOLAMIDE/BRIMONIDINE TARTRATE 10; 2 MG/ML; MG/ML
SUSPENSION/ DROPS OPHTHALMIC
COMMUNITY
Start: 2024-01-12

## 2024-01-18 NOTE — PROGRESS NOTES
Subjective     REASON FOR CONSULTATION:  Provide an opinion on any further workup or treatment on:    Deep vein thrombosis                       REQUESTING PHYSICIAN: Mauricio Degroot MD    RECORDS OBTAINED: Records of the patients history including those obtained from the referring provider were reviewed and summarized in detail.    HISTORY OF PRESENT ILLNESS:    Josiah Loomis is a 39 y.o. patient who was referred for evaluation of deep vein thrombosis.      Patient reports having problem with his vision secondary to uveitis and glaucoma.  He had surgery for the glucoma but he did not feel that it improved his vision.  He has morbid obesity and has been working on losing weight.  He lost about 50 pounds in 2023.    Patient reports that in November 2023, he developed upset stomach and recurrent hiccups.  He had nausea and vomiting.  He presented to the ER at [facility] on 11/14/2023.  He was given IV Zofran but had a reaction to the infusion.  He was hospitalized.  He reported having chest pain.  This was concerning for pulmonary embolism.  CT angiogram chest was obtained and showed no definite PE.  He had a venous Doppler that revealed acute DVT in the left lower extremity.  He was started on Lovenox.  He was discharged home on Eliquis.  He has been taking Eliquis twice daily regularly.  No problem with bleeding or excessive bruising.    Patient reports that he had a change in the type of work she does skin 2023.  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  He noticed that he was not moving much.  He reports that he is drinking plenty of fluids, usually a gallon of water a day.  Despite this, he feels thirsty.  He has constipation and reports that his urine is usually dark/concentrated.    Patient denies having prior history of DVT prior to November 2023.     REVIEW OF SYSTEMS:  Pertinent ROS as in the HPI.     Past Medical History:   Diagnosis  Date    Diabetes mellitus     GERD (gastroesophageal reflux disease)     Glaucoma     History of cataract     History of iridocyclitis     Hyperlipidemia     Hypertension      Past Surgical History:   Procedure Laterality Date    CATARACT EXTRACTION      ENDOSCOPY N/A 11/19/2023    Procedure: ESOPHAGOGASTRODUODENOSCOPY with cold biopsies;  Surgeon: Dwight Witt MD;  Location[facility] ENDOSCOPY;  Service: Gastroenterology;  Laterality: N/A;  pre: epigastric pain  post: gastritis    EYE SURGERY      INTRAOCULAR LENS INSERTION      IRIDOTOMY / IRIDECTOMY      TESTICLE SURGERY  2008     Social History     Socioeconomic History    Marital status:      Spouse name: Brigid   Tobacco Use    Smoking status: Never     Passive exposure: Never    Smokeless tobacco: Never   Vaping Use    Vaping Use: Some days    Substances: THC    Devices: Pre-filled or refillable cartridge   Substance and Sexual Activity    Alcohol use: Not Currently     Comment: rarely    Drug use: Yes     Frequency: 1.0 times per week     Types: Marijuana     Comment: smokes daily per pt    Sexual activity: Defer     Family History   Problem Relation Age of Onset    Crohn's disease Father     Diabetes Maternal Grandmother     Diabetes Paternal Grandmother       MEDICATIONS:    Current Outpatient Medications:     Adalimumab (Humira) 40 MG/0.4ML Prefilled Syringe Kit injection, Inject 0.4 mL under the skin into the appropriate area as directed 1 (One) Time., Disp: , Rfl:     apixaban (ELIQUIS) 5 MG tablet tablet, Take 1 tablet by mouth 2 (Two) Times a Day., Disp: 60 tablet, Rfl: 4    azaTHIOprine (IMURAN) 50 MG tablet, Take 1 tablet by mouth 2 (Two) Times a Day., Disp: , Rfl:     Continuous Blood Gluc Sensor (FreeStyle Checo 3 Sensor) misc, Use 1 each Every 14 (Fourteen) Days., Disp: 6 each, Rfl: 3    diphenhydrAMINE (BENADRYL) 50 MG capsule, Take 1 capsule by mouth Every 6 (Six) Hours As Needed for Itching., Disp: , Rfl:     dorzolamide-timolol  (COSOPT) 2-0.5 % ophthalmic solution, Administer 1 drop to both eyes 2 (Two) Times a Day., Disp: , Rfl:     famotidine (PEPCID) 40 MG tablet, Take 1 tablet by mouth 2 (Two) Times a Day As Needed for Heartburn (nausea)., Disp: 180 tablet, Rfl: 3    glucose blood (Accu-Chek Guide) test strip, 1 each by Other route 4 (Four) Times a Day. Use as instructed. Dx. E11.9, Disp: 400 each, Rfl: 12    insulin aspart (NovoLOG FlexPen ReliOn) 100 UNIT/ML solution pen-injector sc pen, INJECT UP TO 36 UNITS DAILY VIA SLIDING SCALE(3U)150-200, 6U(201-250), 9U(251-300), 12 USE(301-351),, Disp: 33 mL, Rfl: 3    insulin degludec (Tresiba FlexTouch) 100 UNIT/ML solution pen-injector injection, Inject 11 Units under the skin into the appropriate area as directed Every Night., Disp: 15 mL, Rfl: 5    Insulin Pen Needle (BD Pen Needle Dory 2nd Gen) 32G X 4 MM misc, USE AS DIRECTED FOUR TIMES DAILY, Disp: 400 each, Rfl: 5    Lancets 30G misc, Use 1 Device 4 (Four) Times a Day. Dx. E11.9, Disp: 400 each, Rfl: 12    ondansetron (ZOFRAN) 4 MG tablet, Take 1-2 tablets by mouth., Disp: , Rfl:     pantoprazole (PROTONIX) 40 MG EC tablet, Take 1 tablet by mouth Every Morning., Disp: 90 tablet, Rfl: 3    prednisoLONE acetate (PRED FORTE) 1 % ophthalmic suspension, Administer 1 drop to both eyes Daily., Disp: , Rfl:     predniSONE (DELTASONE) 5 MG tablet, Take 1 tablet by mouth Daily., Disp: , Rfl:     rosuvastatin (CRESTOR) 40 MG tablet, TAKE 1 TABLET BY MOUTH ONCE DAILY AT NIGHT, Disp: 90 tablet, Rfl: 3    Semaglutide, 1 MG/DOSE, (OZEMPIC) 4 MG/3ML solution pen-injector, Inject 1 mg under the skin into the appropriate area as directed 1 (One) Time Per Week., Disp: 3 mL, Rfl: 5    Simbrinza 1-0.2 % suspension, , Disp: , Rfl:      ALLERGIES:  Allergies   Allergen Reactions    Zofran [Ondansetron] Itching     IV form only      Objective   VITAL SIGNS:  Vitals:    01/18/24 1430   BP: 145/100   Pulse: 80   Resp: 16   Temp: 98.5 °F (36.9 °C)   TempSrc:  "Temporal   SpO2: 98%   Weight: (!) 155 kg (341 lb 11.2 oz)   Height: 188 cm (74.02\")   PainSc: 0-No pain     Wt Readings from Last 3 Encounters:   01/18/24 (!) 155 kg (341 lb 11.2 oz)   12/05/23 (!) 155 kg (341 lb)   11/29/23 (!) 156 kg (345 lb)     PHYSICAL EXAMINATION  GENERAL:  The patient appears in good general condition, not in acute distress.  SKIN: No skin rashes. No ecchymosis.  HEAD:  Normocephalic.  EYES: Divergent squint.  NECK:  Supple with Good ROM. No Masses.  CHEST: Normal respiratory effort. Lungs clear to auscultation.   CARDIAC:  Normal S1 & S2. No murmur.   EXTREMITIES: No size difference between the legs.  No leg erythema or warmth.  No calf tenderness.  NEUROLOGICAL:  No Focal neurological deficits.     RESULT REVIEW:     Results from last 7 days   Lab Units 01/18/24  1411   WBC 10*3/mm3 8.53   NEUTROS ABS 10*3/mm3 5.32   HEMOGLOBIN g/dL 16.3   HEMATOCRIT % 48.6   PLATELETS 10*3/mm3 301     Component      Latest Ref Rng 11/19/2023   Glucose      65 - 99 mg/dL 96    BUN      6 - 20 mg/dL 9    Creatinine      0.76 - 1.27 mg/dL 1.08    Sodium      136 - 145 mmol/L 138    Potassium      3.5 - 5.2 mmol/L 3.9    Chloride      98 - 107 mmol/L 102    CO2      22.0 - 29.0 mmol/L 30.0 (H)    Calcium      8.6 - 10.5 mg/dL 8.8    Total Protein      6.0 - 8.5 g/dL 5.8 (L)    Albumin      3.5 - 5.2 g/dL 3.3 (L)    ALT (SGPT)      1 - 41 U/L 28    AST (SGOT)      1 - 40 U/L 19    Alkaline Phosphatase      39 - 117 U/L 102    Total Bilirubin      0.0 - 1.2 mg/dL 0.4    Globulin      gm/dL 2.5    A/G Ratio      g/dL 1.3    BUN/Creatinine Ratio      7.0 - 25.0  8.3    Anion Gap      5.0 - 15.0 mmol/L 6.0    eGFR      >60.0 mL/min/1.73 91.2       Venous Doppler on 11/19/2023:    Acute left lower extremity deep vein thrombosis noted in the posterial tibial.    All other veins appeared normal bilaterally.    CT angiogram chest on 11/18/2023:  1. Cardiac size within normal limits, no pericardial or " esophageal  abnormality. Contour and caliber of the thoracic aorta within normal  limits, no dissection. Resolution of the pulmonary arterial tree is less  than optimal due to the patient's large body habitus. No gross pulmonary  embolus seen. Small pulmonary embolus not excluded.     2. Multiple small vague nodular foci of consolidation demonstrated  within the right upper lobe, the appearance most consistent with  atypical pneumonia. Nodular opacities are all subcentimeter in size. The  left lung is clear. No pleural effusion seen. Repeat CT chest  recommended in 6-8 weeks.     3. Limited images through the upper abdomen are unremarkable.    Assessment & Plan   *Acute deep vein thrombosis involving the left posterior tibial vein.  He was diagnosed on 11/19/2023.  The diagnosis was made after the patient had chest pain.  CT angiogram chest revealed no definite PE.  Small PE could not be excluded.  The patient did not have left leg pain or swelling.  His risk factors were obesity, decreased activity, secondary polycythemia and inflammation due to his underlying autoimmune disease.  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  He is on Eliquis 5 mg twice daily.  He is taking Eliquis well.  Exam of the left lower extremity today was unremarkable.    *Polycythemia.  This is likely secondary polycythemia attributed to morbid obesity.  1/18/2024: Hemoglobin 16.3.  Hematocrit 48.6%.    Patient drinks plenty of fluids regularly.    *Lung nodules.  CT on 11/18/2023 revealed multiple small vague nodular foci of consolidation in the right upper lobe.  This was suspected of being atypical pneumonia.  I recommended repeating CT scan as recommended by the radiologist.    PLAN:    1.  Continue Eliquis 5 mg twice daily.  2.  I recommended increasing activity and avoiding prolonged sitting.  3.  I recommended maintaining adequate hydration.  4.  We discussed continuing to work  on weight loss.  5.  Obtain CT chest in mid February 2024.    6.  Obtain venous Doppler in mid February 2024.  7.  I will see him in follow-up after the venous Doppler.  CBC CMP will be obtained.       Angelina Noyola MD  01/18/24

## 2024-01-19 DIAGNOSIS — Z79.4 TYPE 2 DIABETES MELLITUS WITH HYPERGLYCEMIA, WITH LONG-TERM CURRENT USE OF INSULIN: Primary | ICD-10-CM

## 2024-01-19 DIAGNOSIS — E11.65 TYPE 2 DIABETES MELLITUS WITH HYPERGLYCEMIA, WITH LONG-TERM CURRENT USE OF INSULIN: Primary | ICD-10-CM

## 2024-01-19 RX ORDER — LANCETS 30 GAUGE
EACH MISCELLANEOUS
Qty: 200 EACH | Refills: 3 | Status: SHIPPED | OUTPATIENT
Start: 2024-01-19

## 2024-01-27 DIAGNOSIS — E11.65 TYPE 2 DIABETES MELLITUS WITH HYPERGLYCEMIA, WITH LONG-TERM CURRENT USE OF INSULIN: ICD-10-CM

## 2024-01-27 DIAGNOSIS — Z79.4 TYPE 2 DIABETES MELLITUS WITH HYPERGLYCEMIA, WITH LONG-TERM CURRENT USE OF INSULIN: ICD-10-CM

## 2024-01-29 RX ORDER — SEMAGLUTIDE 1.34 MG/ML
1 INJECTION, SOLUTION SUBCUTANEOUS
Qty: 3 ML | Refills: 2 | Status: SHIPPED | OUTPATIENT
Start: 2024-01-29

## 2024-02-06 ENCOUNTER — OFFICE VISIT (OUTPATIENT)
Dept: FAMILY MEDICINE CLINIC | Facility: CLINIC | Age: 39
End: 2024-02-06
Payer: COMMERCIAL

## 2024-02-06 VITALS
HEART RATE: 82 BPM | BODY MASS INDEX: 40.43 KG/M2 | OXYGEN SATURATION: 98 % | HEIGHT: 74 IN | SYSTOLIC BLOOD PRESSURE: 132 MMHG | WEIGHT: 315 LBS | DIASTOLIC BLOOD PRESSURE: 88 MMHG

## 2024-02-06 DIAGNOSIS — Z00.00 WELLNESS EXAMINATION: Primary | ICD-10-CM

## 2024-02-06 DIAGNOSIS — I10 ESSENTIAL HYPERTENSION: ICD-10-CM

## 2024-02-06 DIAGNOSIS — Z79.4 TYPE 2 DIABETES MELLITUS WITH HYPERGLYCEMIA, WITH LONG-TERM CURRENT USE OF INSULIN: ICD-10-CM

## 2024-02-06 DIAGNOSIS — K75.4 AUTOIMMUNE HEPATITIS: ICD-10-CM

## 2024-02-06 DIAGNOSIS — E11.9 ENCOUNTER FOR DIABETIC FOOT EXAM: ICD-10-CM

## 2024-02-06 DIAGNOSIS — E11.65 TYPE 2 DIABETES MELLITUS WITH HYPERGLYCEMIA, WITH LONG-TERM CURRENT USE OF INSULIN: ICD-10-CM

## 2024-02-06 DIAGNOSIS — H20.9 UVEITIS: ICD-10-CM

## 2024-02-06 DIAGNOSIS — E78.5 DYSLIPIDEMIA: ICD-10-CM

## 2024-02-06 DIAGNOSIS — E66.01 MORBID (SEVERE) OBESITY DUE TO EXCESS CALORIES: ICD-10-CM

## 2024-02-06 PROBLEM — H35.059: Status: ACTIVE | Noted: 2024-01-30

## 2024-02-06 RX ORDER — METHYLPREDNISOLONE 4 MG/1
TABLET ORAL
Qty: 21 TABLET | Refills: 0 | Status: SHIPPED | OUTPATIENT
Start: 2024-02-06

## 2024-02-06 RX ORDER — LANCING DEVICE/LANCETS
1 KIT MISCELLANEOUS 2 TIMES DAILY
Qty: 1 KIT | Refills: 1 | Status: SHIPPED | OUTPATIENT
Start: 2024-02-06

## 2024-02-06 RX ORDER — LANCETS 30 GAUGE
EACH MISCELLANEOUS
Qty: 200 EACH | Refills: 3 | Status: SHIPPED | OUTPATIENT
Start: 2024-02-06

## 2024-02-06 NOTE — PROGRESS NOTES
Subjective   Josiah Loomis is a 39 y.o. male. Presents today for   Chief Complaint   Patient presents with    Annual Exam    Med Management    Diabetes       History of Present Illness  Patient 40 y/o male with dm2,htn, hld, uveitis (sees ophthalmology/rheumatology) and possible autoimmune hepatitis (sees GI)  Doing well overall;  Reports new CNV with retina heme present on left eye 1/30/24 at Ophthalmology given injection os avastin.   No cp/soa;  no abd pain;  Last A1c 7.1% now;   BS's trending down; needs ozempic and lancets fast clicks refilled, but no acute issues;  Started going back to gym to exercise.  Reports visual change improved     Review of Systems   Eyes:  Positive for visual disturbance.   Respiratory:  Negative for shortness of breath.    Cardiovascular:  Negative for chest pain and palpitations.   Gastrointestinal:  Negative for abdominal pain, nausea and vomiting.       Patient Active Problem List   Diagnosis    Sprain of acromioclavicular ligament    Atopic dermatitis    Essential hypertension    Dyslipidemia    Abdominal hernia    Morbidly obese    Type 2 diabetes mellitus without complication, with long-term current use of insulin    Hyperlipidemia    Abnormal liver function tests    Autoimmune hepatitis    Cataract extraction status of left eye    Cataract of right eye    Cystoid macular edema    Diabetes mellitus    Disorder due to insertion of glaucoma drainage device    Gastroesophageal reflux disease    High gamma glutamyl transferase (GGT)    Other specified postprocedural states    Other states following surgery of eye and adnexa    Pseudophakia of left eye    Panuveitis of both eyes    Sarcoidosis    Uveitic glaucoma    Uveitis    Exotropia of right eye    Fever    Allergic reaction caused by a drug    Acute DVT (deep venous thrombosis)    Classic choroidal neovascular membrane       Social History     Socioeconomic History    Marital status:      Spouse name: Brigid   Tobacco  Use    Smoking status: Never     Passive exposure: Never    Smokeless tobacco: Never   Vaping Use    Vaping Use: Some days    Substances: THC    Devices: Pre-filled or refillable cartridge   Substance and Sexual Activity    Alcohol use: Yes     Comment: rarely    Drug use: Yes     Frequency: 1.0 times per week     Types: Marijuana     Comment: smokes daily per pt    Sexual activity: Yes     Partners: Female       Allergies   Allergen Reactions    Zofran [Ondansetron] Itching     IV form only       Current Outpatient Medications on File Prior to Visit   Medication Sig Dispense Refill    Adalimumab (Humira) 40 MG/0.4ML Prefilled Syringe Kit injection Inject 0.4 mL under the skin into the appropriate area as directed 1 (One) Time.      apixaban (ELIQUIS) 5 MG tablet tablet Take 1 tablet by mouth 2 (Two) Times a Day. 60 tablet 4    azaTHIOprine (IMURAN) 50 MG tablet Take 1 tablet by mouth 2 (Two) Times a Day.      Continuous Blood Gluc Sensor (FreeStyle Checo 3 Sensor) [facility] Use 1 each Every 14 (Fourteen) Days. 6 each 3    diphenhydrAMINE (BENADRYL) 50 MG capsule Take 1 capsule by mouth Every 6 (Six) Hours As Needed for Itching.      dorzolamide-timolol (COSOPT) 2-0.5 % ophthalmic solution Administer 1 drop to both eyes 2 (Two) Times a Day.      famotidine (PEPCID) 40 MG tablet Take 1 tablet by mouth 2 (Two) Times a Day As Needed for Heartburn (nausea). 180 tablet 3    glucose blood (Accu-Chek Guide) test strip 1 each by Other route 4 (Four) Times a Day. Use as instructed. Dx. E11.9 400 each 12    insulin aspart (NovoLOG FlexPen ReliOn) 100 UNIT/ML solution pen-injector sc pen INJECT UP TO 36 UNITS DAILY VIA SLIDING SCALE(3U)150-200, 6U(201-250), 9U(251-300), 12 USE(301-351), 33 mL 3    insulin degludec (Tresiba FlexTouch) 100 UNIT/ML solution pen-injector injection Inject 11 Units under the skin into the appropriate area as directed Every Night. 15 mL 5    Insulin Pen Needle (BD Pen Needle Dory 2nd Gen) 32G X 4 MM misc  "USE AS DIRECTED FOUR TIMES DAILY 400 each 5    ondansetron (ZOFRAN) 4 MG tablet Take 1-2 tablets by mouth.      pantoprazole (PROTONIX) 40 MG EC tablet Take 1 tablet by mouth Every Morning. 90 tablet 3    prednisoLONE acetate (PRED FORTE) 1 % ophthalmic suspension Administer 1 drop to both eyes Daily.      predniSONE (DELTASONE) 5 MG tablet Take 1 tablet by mouth Daily.      rosuvastatin (CRESTOR) 40 MG tablet TAKE 1 TABLET BY MOUTH ONCE DAILY AT NIGHT 90 tablet 3    Simbrinza 1-0.2 % suspension       [DISCONTINUED] Lancets misc Check 4x daily dx iddm ACCU CHECK GLUCOSE METER FAST CLICK LANCETS 200 each 3    [DISCONTINUED] Semaglutide, 1 MG/DOSE, (Ozempic, 1 MG/DOSE,) 4 MG/3ML solution pen-injector Inject 1 mg under the skin into the appropriate area as directed 1 (One) Time Per Week. 3 mL 2     No current facility-administered medications on file prior to visit.       Objective   Vitals:    02/06/24 1516   BP: 132/90   Pulse: 82   SpO2: 98%   Weight: (!) 155 kg (342 lb)   Height: 188 cm (74.02\")     Body mass index is 43.89 kg/m².    Physical Exam  Vitals and nursing note reviewed.   Constitutional:       Appearance: He is well-developed.   HENT:      Head: Normocephalic and atraumatic.   Neck:      Thyroid: No thyromegaly.      Vascular: No JVD.   Cardiovascular:      Rate and Rhythm: Normal rate and regular rhythm.      Heart sounds: Normal heart sounds. No murmur heard.     No friction rub. No gallop.   Pulmonary:      Effort: Pulmonary effort is normal. No respiratory distress.      Breath sounds: Normal breath sounds. No wheezing or rales.   Abdominal:      General: Bowel sounds are normal. There is no distension.      Palpations: Abdomen is soft.      Tenderness: There is no abdominal tenderness. There is no guarding or rebound.   Musculoskeletal:      Cervical back: Neck supple.   Feet:      Comments: Diabetic foot exam and monofilament completed, see scanned report.        Skin:     General: Skin is warm " and dry.   Neurological:      Mental Status: He is alert.   Psychiatric:         Behavior: Behavior normal.         Assessment & Plan   Diagnoses and all orders for this visit:    1. Wellness examination (Primary)    2. Type 2 diabetes mellitus with hyperglycemia, with long-term current use of insulin  -     Lancets misc; Check 4x daily dx iddm ACCU CHECK GLUCOSE METER FAST CLICK LANCETS  Dispense: 200 each; Refill: 3  -     Semaglutide, 2 MG/DOSE, (OZEMPIC) 8 MG/3ML solution pen-injector; Inject 2 mg under the skin into the appropriate area as directed 1 (One) Time Per Week.  Dispense: 9 mL; Refill: 3    3. Morbid (severe) obesity due to excess calories  -     Semaglutide, 2 MG/DOSE, (OZEMPIC) 8 MG/3ML solution pen-injector; Inject 2 mg under the skin into the appropriate area as directed 1 (One) Time Per Week.  Dispense: 9 mL; Refill: 3    4. Encounter for diabetic foot exam    Other orders  -     Lancets Misc. (Accu-Chek FastClix Lancet) kit; Use 1 each 2 (Two) Times a Day.  Dispense: 1 kit; Refill: 1    Counseled on diet and exercise;  work on weight loss  Next ov address chronic disease issues                 -Follow up:  3 months and prn

## 2024-02-27 ENCOUNTER — HOSPITAL ENCOUNTER (OUTPATIENT)
Dept: CARDIOLOGY | Facility: HOSPITAL | Age: 39
Discharge: HOME | End: 2024-02-27
Payer: COMMERCIAL

## 2024-02-27 ENCOUNTER — APPOINTMENT (OUTPATIENT)
Dept: CT IMAGING | Facility: HOSPITAL | Age: 39
End: 2024-02-27
Payer: COMMERCIAL

## 2024-02-27 DIAGNOSIS — I82.402 ACUTE DEEP VEIN THROMBOSIS (DVT) OF LEFT LOWER EXTREMITY, UNSPECIFIED VEIN: ICD-10-CM

## 2024-02-27 DIAGNOSIS — R91.8 LUNG NODULES: ICD-10-CM

## 2024-02-27 LAB
BH CV LOWER VASCULAR LEFT COMMON FEMORAL AUGMENT: NORMAL
BH CV LOWER VASCULAR LEFT COMMON FEMORAL COMPETENT: NORMAL
BH CV LOWER VASCULAR LEFT COMMON FEMORAL COMPRESS: NORMAL
BH CV LOWER VASCULAR LEFT COMMON FEMORAL PHASIC: NORMAL
BH CV LOWER VASCULAR LEFT COMMON FEMORAL SPONT: NORMAL
BH CV LOWER VASCULAR LEFT DISTAL FEMORAL COMPRESS: NORMAL
BH CV LOWER VASCULAR LEFT GASTRONEMIUS COMPRESS: NORMAL
BH CV LOWER VASCULAR LEFT GREATER SAPH AK COMPRESS: NORMAL
BH CV LOWER VASCULAR LEFT GREATER SAPH BK COMPRESS: NORMAL
BH CV LOWER VASCULAR LEFT LESSER SAPH COMPRESS: NORMAL
BH CV LOWER VASCULAR LEFT MID FEMORAL AUGMENT: NORMAL
BH CV LOWER VASCULAR LEFT MID FEMORAL COMPETENT: NORMAL
BH CV LOWER VASCULAR LEFT MID FEMORAL COMPRESS: NORMAL
BH CV LOWER VASCULAR LEFT MID FEMORAL PHASIC: NORMAL
BH CV LOWER VASCULAR LEFT MID FEMORAL SPONT: NORMAL
BH CV LOWER VASCULAR LEFT PERONEAL COMPRESS: NORMAL
BH CV LOWER VASCULAR LEFT POPLITEAL AUGMENT: NORMAL
BH CV LOWER VASCULAR LEFT POPLITEAL COMPETENT: NORMAL
BH CV LOWER VASCULAR LEFT POPLITEAL COMPRESS: NORMAL
BH CV LOWER VASCULAR LEFT POPLITEAL PHASIC: NORMAL
BH CV LOWER VASCULAR LEFT POPLITEAL SPONT: NORMAL
BH CV LOWER VASCULAR LEFT POSTERIOR TIBIAL COMPRESS: NORMAL
BH CV LOWER VASCULAR LEFT PROFUNDA FEMORAL COMPRESS: NORMAL
BH CV LOWER VASCULAR LEFT PROXIMAL FEMORAL COMPRESS: NORMAL
BH CV LOWER VASCULAR LEFT SAPHENOFEMORAL JUNCTION COMPRESS: NORMAL
BH CV LOWER VASCULAR RIGHT COMMON FEMORAL AUGMENT: NORMAL
BH CV LOWER VASCULAR RIGHT COMMON FEMORAL COMPETENT: NORMAL
BH CV LOWER VASCULAR RIGHT COMMON FEMORAL COMPRESS: NORMAL
BH CV LOWER VASCULAR RIGHT COMMON FEMORAL PHASIC: NORMAL
BH CV LOWER VASCULAR RIGHT COMMON FEMORAL SPONT: NORMAL

## 2024-02-27 PROCEDURE — 93971 EXTREMITY STUDY: CPT

## 2024-02-27 NOTE — PROGRESS NOTES
Josiah Loomis  1985  Date of Office Visit: 11/29/23  Encounter Provider: Farooq Thorne MD  Place of Service: [facility]      CHIEF COMPLAINT:  Chest pain    HISTORY OF PRESENT ILLNESS:  38 y.o. 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 He initially present to the ED with GI complains on 11/14/23 and was subsequently discharged after receiving zofran and a GI cocktail. He represented this admission with epigastric and chest discomfort similar to his initial presentation on the 14th as well as nausea and vomiting and hiccups and so was admitted for further evaluation and treatment.  Per his report the chest discomfort was central and felt like food was getting stuck in his esophagus. He denied any increase with ambulation or other activities.     A CT of his abdomen pelvis was negative, and troponins were negative x2, and BNP was negative, and. t EKG was normal s.  His symptoms sounded gastrointestinal in nature, and so he was started on IV PPI twice daily, and gastroenterology was consulted.  They took him for an EGD which only showed some gastritis, and so his IV PPI was transitioned to oral PPI.      CTA chest which was negative for pulmonary embolism, but the evaluation was limited due to his habitus, and so a small PE could not be ruled out.  A D-dimer was subsequently checked and this was positive, and so he underwent lower extremity Dopplers which did show an acute left lower extremity DVT.  He was subsequently started on therapeutic Lovenox and then transition to oral Eliquis which she will need to continue.  Hematology consultation was placed but it does not look like he has seen them yet.        Review of Systems   Constitutional: Negative for fever and malaise/fatigue.    HENT:  Negative for nosebleeds and sore throat.    Eyes:  Negative for blurred vision and double vision.   Cardiovascular:  Negative for chest pain, claudication, palpitations and syncope.   Respiratory:  Negative for cough, shortness of breath and snoring.    Endocrine: Negative for cold intolerance, heat intolerance and polydipsia.   Skin:  Negative for itching, poor wound healing and rash.   Musculoskeletal:  Negative for joint pain, joint swelling, muscle weakness and myalgias.   Gastrointestinal:  Negative for abdominal pain, melena, nausea and vomiting.   Neurological:  Negative for light-headedness, loss of balance, seizures, vertigo and weakness.   Psychiatric/Behavioral:  Negative for altered mental status and depression.        Past Medical History:   Diagnosis Date    Diabetes mellitus     GERD (gastroesophageal reflux disease)     Hyperlipidemia     Hypertension        The following portions of the patient's history were reviewed and updated as appropriate: Social history , Family history, and Surgical history     Current Outpatient Medications on File Prior to Visit   Medication Sig Dispense Refill    Adalimumab (Humira) 40 MG/0.4ML Prefilled Syringe Kit injection Inject 0.4 mL under the skin into the appropriate area as directed 1 (One) Time.      Apixaban Starter Pack tablet therapy pack Take 2 tablets by mouth Every 12 (Twelve) Hours for 7 days, THEN 1 tablet Every 12 (Twelve) Hours for 30 days. Indications: DVT/PE (active thrombosis) 74 tablet 0    azaTHIOprine (IMURAN) 50 MG tablet Take 1 tablet by mouth 2 (Two) Times a Day.      Cholecalciferol (vitamin D3) 125 MCG (5000 UT) tablet tablet Take 1 tablet by mouth Daily. 90 tablet 1    Continuous Blood Gluc Sensor (FreeStyle Checo 3 Sensor) misc 1 each Every 14 (Fourteen) Days. 6 each 3    diphenhydrAMINE (BENADRYL) 50 MG capsule Take 1 capsule by mouth Every 6 (Six) Hours As Needed for Itching.      famotidine (PEPCID) 40 MG tablet TAKE 1 TABLET  "BY MOUTH ONCE DAILY AT NIGHT 90 tablet 0    glucose blood (Accu-Chek Guide) test strip 1 each by Other route 4 (Four) Times a Day. Use as instructed. Dx. E11.9 400 each 12    insulin aspart (NovoLOG FlexPen ReliOn) 100 UNIT/ML solution pen-injector sc pen INJECT UP TO 36 UNITS DAILY VIA SLIDING SCALE(3U)150-200, 6U(201-250), 9U(251-300), 12 USE(301-351), 33 mL 3    insulin degludec (Tresiba FlexTouch) 100 UNIT/ML solution pen-injector injection Inject 11 Units under the skin into the appropriate area as directed Every Night. 15 mL 5    Insulin Pen Needle (BD Pen Needle Dory 2nd Gen) 32G X 4 MM misc USE AS DIRECTED FOUR TIMES DAILY 400 each 5    Lancets 30G misc 1 Device 4 (Four) Times a Day. Dx. E11.9 400 each 12    ondansetron (ZOFRAN) 4 MG tablet Take 1-2 tablets by mouth.      pantoprazole (PROTONIX) 40 MG EC tablet Take 1 tablet by mouth Every Morning. 30 tablet 0    prednisoLONE acetate (PRED FORTE) 1 % ophthalmic suspension Administer 1 drop to both eyes Daily.      predniSONE (DELTASONE) 5 MG tablet TAKE 6 TABLETS BY MOUTH 1 TIME DAILY FOR 2 WEEKS. THEN TAKE 4 TABLETS BY MOUTH DAILY FOR 4 WEEKS.      rosuvastatin (CRESTOR) 40 MG tablet TAKE 1 TABLET BY MOUTH ONCE DAILY AT NIGHT 90 tablet 0    Semaglutide, 1 MG/DOSE, (OZEMPIC) 4 MG/3ML solution pen-injector Inject 1 mg under the skin into the appropriate area as directed 1 (One) Time Per Week. 3 mL 5    TIMOLOL-DORZOLAMID-LATANOPROST OP Apply  to eye(s) as directed by provider.       No current facility-administered medications on file prior to visit.       Allergies   Allergen Reactions    Zofran [Ondansetron] Itching     IV form only       Vitals:    11/29/23 1442   Weight: (!) 156 kg (345 lb)   Height: 188 cm (74\")     Body mass index is 44.3 kg/m².   Constitutional:       Appearance: Well-developed.      Comments: Obese   Eyes:      General: No scleral icterus.     Conjunctiva/sclera: Conjunctivae normal.   HENT:      Head: Normocephalic and atraumatic. "   Neck:      Thyroid: No thyromegaly.      Vascular: Normal carotid pulses. No carotid bruit, hepatojugular reflux or JVD.      Trachea: No tracheal deviation.   Pulmonary:      Effort: No respiratory distress.      Breath sounds: Normal breath sounds. No decreased breath sounds. No wheezing. No rhonchi. No rales.   Chest:      Chest wall: Not tender to palpatation.   Cardiovascular:      Normal rate. Regular rhythm.      No gallop.    Pulses:     Carotid: 2+ bilaterally.     Radial: 2+ bilaterally.     Femoral: 2+ bilaterally.     Dorsalis pedis: 2+ bilaterally.     Posterior tibial: 2+ bilaterally.  Edema:     Peripheral edema absent.   Abdominal:      General: Bowel sounds are normal. There is no distension.      Palpations: Abdomen is soft.      Tenderness: There is no abdominal tenderness.   Musculoskeletal:         General: No deformity.      Cervical back: Normal range of motion and neck supple. Skin:     Findings: No erythema or rash.   Neurological:      Mental Status: Alert and oriented to person, place, and time.      Sensory: No sensory deficit.   Psychiatric:         Behavior: Behavior normal.         Lab Results   Component Value Date    WBC 9.47 11/19/2023    HGB 13.9 11/19/2023    HCT 41.4 11/19/2023    MCV 77.7 (L) 11/19/2023     11/19/2023       Lab Results   Component Value Date    GLUCOSE 96 11/19/2023    BUN 9 11/19/2023    CREATININE 1.08 11/19/2023    EGFRRESULT 97 01/17/2023    EGFR 91.2 11/19/2023    BCR 8.3 11/19/2023    K 3.9 11/19/2023    CO2 30.0 (H) 11/19/2023    CALCIUM 8.8 11/19/2023    PROTENTOTREF 7.7 01/17/2023    ALBUMIN 3.3 (L) 11/19/2023    BILITOT 0.4 11/19/2023    AST 19 11/19/2023    ALT 28 11/19/2023       Lab Results   Component Value Date    GLUCOSE 96 11/19/2023    CALCIUM 8.8 11/19/2023     11/19/2023    K 3.9 11/19/2023    CO2 30.0 (H) 11/19/2023     11/19/2023    BUN 9 11/19/2023    CREATININE 1.08 11/19/2023    EGFRRESULT 97 01/17/2023    EGFR 91.2  11/19/2023    BCR 8.3 11/19/2023    ANIONGAP 6.0 11/19/2023       Lab Results   Component Value Date    CHLPL 365 (H) 01/17/2023    TRIG 340 (H) 01/17/2023    HDL 57 01/17/2023     (H) 01/17/2023         ECG 12 Lead    Date/Time: 11/29/2023 3:14 PM  Performed by: Farooq Thorne MD    Authorized by: Farooq Thorne MD  Comparison: compared with previous ECG from 11/18/2023  Similar to previous ECG  Rhythm: sinus tachycardia  Rate: tachycardic  QRS axis: normal           Results for orders placed during the hospital encounter of 11/17/23    Adult Transthoracic Echo Complete W/ Cont if Necessary Per Protocol    Interpretation Summary    Left ventricular systolic function is normal. Calculated left ventricular EF = 58.4%    Left ventricular diastolic function was normal.    Estimated right ventricular systolic pressure from tricuspid regurgitation is normal (<35 mmHg).      DISCUSSION/SUMMARY  Very pleasant 38-year-old female with a medical history of obesity, diabetes mellitus, hyperlipidemia, who presents to me for evaluation of chest discomfort.  Chest comfort predominantly sounds gastrointestinal, however endoscopy just showed mild gastritis.  He stated it felt like food was getting stuck in his chest and he had significant discomfort, vomiting and hiccups.  He denied any increase with ambulation.  He has had a few episodes of recurrence of similar symptoms.  His EKG has been within normal limits.  Cardiac biomarkers were negative.  He did have a transthoracic echocardiogram on 11/17/2023 which was normal.    He did have a CTA of his chest which did not show any evidence of pulmonary embolus but was limited in the segmental branches.  He had no coronary calcification documented that time.  He was diagnosed with a DVT and subsequently has been on Eliquis therapy    1.  Chest pain: Most likely GI in etiology.  In the setting of his obesity, diabetes mellitus and hyperlipidemia I do think  perfusion stress test is reasonable.  Secondary to his obesity he will not be able to run on the treadmill.    2.  Lower extremity DVT: No evidence of PE on my review of the CT scan but segmental vessel imaging is limited by his body habitus.  I think that 6 months of therapy is reasonable to cover is either way.  I would not recommend lifelong therapy but do agree with hematology follow-up.   dietitian/nutrition services

## 2024-03-01 ENCOUNTER — TELEPHONE (OUTPATIENT)
Dept: ONCOLOGY | Facility: CLINIC | Age: 39
End: 2024-03-01
Payer: COMMERCIAL

## 2024-03-01 NOTE — TELEPHONE ENCOUNTER
Called pt and LM with appt change. Pt missed CT scan. Rescheduled both MD appt and CT scan. Advised pt to call back and confirm.

## 2024-03-04 ENCOUNTER — TELEPHONE (OUTPATIENT)
Dept: FAMILY MEDICINE CLINIC | Facility: CLINIC | Age: 39
End: 2024-03-04
Payer: COMMERCIAL

## 2024-03-04 DIAGNOSIS — Z79.4 TYPE 2 DIABETES MELLITUS WITH HYPERGLYCEMIA, WITH LONG-TERM CURRENT USE OF INSULIN: ICD-10-CM

## 2024-03-04 DIAGNOSIS — E66.01 MORBID (SEVERE) OBESITY DUE TO EXCESS CALORIES: ICD-10-CM

## 2024-03-04 DIAGNOSIS — E11.65 TYPE 2 DIABETES MELLITUS WITH HYPERGLYCEMIA, WITH LONG-TERM CURRENT USE OF INSULIN: ICD-10-CM

## 2024-03-14 DIAGNOSIS — N46.9 MALE INFERTILITY: Primary | ICD-10-CM

## 2024-03-14 DIAGNOSIS — N47.1 PHIMOSIS OF PENIS: ICD-10-CM

## 2024-03-21 ENCOUNTER — TELEPHONE (OUTPATIENT)
Dept: ONCOLOGY | Facility: CLINIC | Age: 39
End: 2024-03-21
Payer: COMMERCIAL

## 2024-04-02 NOTE — PLAN OF CARE
04/02/24 1215   Discharge Planning   Living Arrangements Spouse/significant other   Support Systems Spouse/significant other   Assistance Needed Independent   Number of Stairs to Enter Residence 2   Number of Stairs Within Residence 0  (All needs on first floor)   Do you have animals or pets at home? No   Who is requesting discharge planning? Patient   Home or Post Acute Services In home services   Type of Home Care Services Home OT;Home PT   Patient expects to be discharged to: Home with HHC   Does the patient need discharge transport arranged? No   Patient Choice   Patient / Family choosing to utilize agency / facility established prior to hospitalization Yes  ([facility] - has had before)       Spoke with patient bedside.  Discussed discharge planning.  Patient's wife has had [facility] home care and they request to have this company.   [facility]erral [facility].  ADOD:  maybe tomorrow  Patient remains on 3L NC today.    1304:   Accepted by [facility].   Goal Outcome Evaluation:  Plan of Care Reviewed With: patient, spouse        Progress: no change  Outcome Evaluation: sinus tach, room air, A/Ox4, standby assist to bathroom. Came to hospital on 11/14 for difficulty swallowing and difficulty breathing, d/c home. pt states have allergic reaction to Zofran r/t some lip swelling noted in early AM hours on 11/15. Admitted today for fever and elevated WBC count. IV Rocephin given in ED. Plans to d/c home, family to transport pending medical clearance.

## 2024-04-30 ENCOUNTER — TELEPHONE (OUTPATIENT)
Dept: ONCOLOGY | Facility: CLINIC | Age: 39
End: 2024-04-30

## 2024-04-30 NOTE — TELEPHONE ENCOUNTER
Caller: Josiah Loomis    Relationship to patient: Self    Best call back number: [phone]    Chief complaint: PT CALLED TO RESCHEDULE     Type of visit: LAB AND FOLLOW UP    If rescheduling, when is the original appointment: 4-30-24

## 2024-09-16 DIAGNOSIS — Z79.4 TYPE 2 DIABETES MELLITUS WITH HYPERGLYCEMIA, WITH LONG-TERM CURRENT USE OF INSULIN: ICD-10-CM

## 2024-09-16 DIAGNOSIS — E11.65 TYPE 2 DIABETES MELLITUS WITH HYPERGLYCEMIA, WITH LONG-TERM CURRENT USE OF INSULIN: ICD-10-CM

## 2024-09-16 RX ORDER — APIXABAN 5 MG/1
5 TABLET, FILM COATED ORAL 2 TIMES DAILY
Qty: 60 TABLET | Refills: 0 | Status: SHIPPED | OUTPATIENT
Start: 2024-09-16

## 2024-09-16 RX ORDER — BLOOD-GLUCOSE SENSOR
1 EACH MISCELLANEOUS
Qty: 6 EACH | Refills: 0 | Status: SHIPPED | OUTPATIENT
Start: 2024-09-16

## 2024-12-05 DIAGNOSIS — Z79.4 TYPE 2 DIABETES MELLITUS WITH OTHER OPHTHALMIC COMPLICATION, WITH LONG-TERM CURRENT USE OF INSULIN: Primary | ICD-10-CM

## 2024-12-05 DIAGNOSIS — E11.39 TYPE 2 DIABETES MELLITUS WITH OTHER OPHTHALMIC COMPLICATION, WITH LONG-TERM CURRENT USE OF INSULIN: Primary | ICD-10-CM

## 2025-01-13 ENCOUNTER — TELEPHONE (OUTPATIENT)
Dept: ONCOLOGY | Facility: CLINIC | Age: 40
End: 2025-01-13
Payer: COMMERCIAL

## 2025-01-13 ENCOUNTER — TELEPHONE (OUTPATIENT)
Dept: FAMILY MEDICINE CLINIC | Facility: CLINIC | Age: 40
End: 2025-01-13
Payer: COMMERCIAL

## 2025-01-13 DIAGNOSIS — K21.9 GASTROESOPHAGEAL REFLUX DISEASE WITHOUT ESOPHAGITIS: ICD-10-CM

## 2025-01-13 DIAGNOSIS — E78.5 DYSLIPIDEMIA: ICD-10-CM

## 2025-01-13 RX ORDER — ROSUVASTATIN CALCIUM 40 MG/1
40 TABLET, COATED ORAL NIGHTLY
Qty: 90 TABLET | Refills: 3 | Status: SHIPPED | OUTPATIENT
Start: 2025-01-13

## 2025-01-13 RX ORDER — FAMOTIDINE 40 MG/1
40 TABLET, FILM COATED ORAL 2 TIMES DAILY PRN
Qty: 180 TABLET | Refills: 3 | Status: SHIPPED | OUTPATIENT
Start: 2025-01-13

## 2025-01-13 NOTE — TELEPHONE ENCOUNTER
Caller: Josiah Loomis    Relationship to patient: Self    Best call back number: [phone]    Chief complaint: SCHEDULING    Type of visit: LAB, FOLLOW UP 1    Requested date: NEXT AVLIABLE WEDNESDAY, THURSDAY OR FRIDAY      If rescheduling, when is the original appointment: 6-

## 2025-01-13 NOTE — TELEPHONE ENCOUNTER
Caller: VladislavJosiah    Relationship: Self    Best call back number:     [phone]       Requested Prescriptions:   Requested Prescriptions     Pending Prescriptions Disp Refills    famotidine (PEPCID) 40 MG tablet 180 tablet 3     Sig: Take 1 tablet by mouth 2 (Two) Times a Day As Needed for Heartburn (nausea).    rosuvastatin (CRESTOR) 40 MG tablet 90 tablet 3     Sig: Take 1 tablet by mouth Every Night.        Pharmacy where request should be sent: [id number] [address] - [phone]  - [phone] FX     Last office visit with prescribing clinician: 2/6/2024   Last telemedicine visit with prescribing clinician: Visit date not found   Next office visit with prescribing clinician: 4/14/2025     Does the patient have less than a 3 day supply:  [] Yes  [x] No      Sabina Castle Rep   01/13/25 11:01 EST

## 2025-01-13 NOTE — TELEPHONE ENCOUNTER
Caller: Josiah Loomis    Relationship: Self    Best call back number: [phone]     What medication are you requesting:     Eliquis 5 MG tablet tablet     Have you had these symptoms before:    [x] Yes  [] No    Have you been treated for these symptoms before:   [x] Yes  [] No    If a prescription is needed, what is your preferred pharmacy and phone number: [id number] - [place], [place] - [address] - [phone] [facility] [phone] FX     Additional notes:  PLEASE CALL TO CONFIRM OR DENY.

## 2025-01-16 ENCOUNTER — LAB (OUTPATIENT)
Dept: LAB | Facility: HOSPITAL | Age: 40
End: 2025-01-16
Payer: COMMERCIAL

## 2025-01-16 ENCOUNTER — OFFICE VISIT (OUTPATIENT)
Dept: ONCOLOGY | Facility: CLINIC | Age: 40
End: 2025-01-16
Payer: COMMERCIAL

## 2025-01-16 VITALS
DIASTOLIC BLOOD PRESSURE: 80 MMHG | WEIGHT: 315 LBS | BODY MASS INDEX: 40.43 KG/M2 | HEIGHT: 74 IN | TEMPERATURE: 98.2 F | HEART RATE: 82 BPM | RESPIRATION RATE: 16 BRPM | OXYGEN SATURATION: 95 % | SYSTOLIC BLOOD PRESSURE: 145 MMHG

## 2025-01-16 DIAGNOSIS — D75.1 SECONDARY POLYCYTHEMIA: ICD-10-CM

## 2025-01-16 DIAGNOSIS — R91.8 LUNG NODULES: ICD-10-CM

## 2025-01-16 DIAGNOSIS — I82.402 ACUTE DEEP VEIN THROMBOSIS (DVT) OF LEFT LOWER EXTREMITY, UNSPECIFIED VEIN: Primary | ICD-10-CM

## 2025-01-16 DIAGNOSIS — Z79.01 CHRONIC ANTICOAGULATION: ICD-10-CM

## 2025-01-16 DIAGNOSIS — I82.402 ACUTE DEEP VEIN THROMBOSIS (DVT) OF LEFT LOWER EXTREMITY, UNSPECIFIED VEIN: ICD-10-CM

## 2025-01-16 LAB
ALBUMIN SERPL-MCNC: 4 G/DL (ref 3.5–5.2)
ALBUMIN/GLOB SERPL: 1.2 G/DL
ALP SERPL-CCNC: 137 U/L (ref 39–117)
ALT SERPL W P-5'-P-CCNC: 39 U/L (ref 1–41)
ANION GAP SERPL CALCULATED.3IONS-SCNC: 12 MMOL/L (ref 5–15)
AST SERPL-CCNC: 47 U/L (ref 1–40)
BASOPHILS # BLD AUTO: 0.06 10*3/MM3 (ref 0–0.2)
BASOPHILS NFR BLD AUTO: 0.7 % (ref 0–1.5)
BILIRUB SERPL-MCNC: 0.7 MG/DL (ref 0–1.2)
BUN SERPL-MCNC: 13 MG/DL (ref 6–20)
BUN/CREAT SERPL: 10.8 (ref 7–25)
CALCIUM SPEC-SCNC: 9.3 MG/DL (ref 8.6–10.5)
CHLORIDE SERPL-SCNC: 101 MMOL/L (ref 98–107)
CO2 SERPL-SCNC: 24 MMOL/L (ref 22–29)
CREAT SERPL-MCNC: 1.2 MG/DL (ref 0.76–1.27)
DEPRECATED RDW RBC AUTO: 39.8 FL (ref 37–54)
EGFRCR SERPLBLD CKD-EPI 2021: 79.4 ML/MIN/1.73
EOSINOPHIL # BLD AUTO: 0.28 10*3/MM3 (ref 0–0.4)
EOSINOPHIL NFR BLD AUTO: 3.4 % (ref 0.3–6.2)
ERYTHROCYTE [DISTWIDTH] IN BLOOD BY AUTOMATED COUNT: 13.7 % (ref 12.3–15.4)
GLOBULIN UR ELPH-MCNC: 3.4 GM/DL
GLUCOSE SERPL-MCNC: 110 MG/DL (ref 65–99)
HCT VFR BLD AUTO: 44.8 % (ref 37.5–51)
HGB BLD-MCNC: 15.1 G/DL (ref 13–17.7)
IMM GRANULOCYTES # BLD AUTO: 0.03 10*3/MM3 (ref 0–0.05)
IMM GRANULOCYTES NFR BLD AUTO: 0.4 % (ref 0–0.5)
LYMPHOCYTES # BLD AUTO: 1.96 10*3/MM3 (ref 0.7–3.1)
LYMPHOCYTES NFR BLD AUTO: 24 % (ref 19.6–45.3)
MCH RBC QN AUTO: 27 PG (ref 26.6–33)
MCHC RBC AUTO-ENTMCNC: 33.7 G/DL (ref 31.5–35.7)
MCV RBC AUTO: 80 FL (ref 79–97)
MONOCYTES # BLD AUTO: 0.62 10*3/MM3 (ref 0.1–0.9)
MONOCYTES NFR BLD AUTO: 7.6 % (ref 5–12)
NEUTROPHILS NFR BLD AUTO: 5.23 10*3/MM3 (ref 1.7–7)
NEUTROPHILS NFR BLD AUTO: 63.9 % (ref 42.7–76)
NRBC BLD AUTO-RTO: 0 /100 WBC (ref 0–0.2)
PLATELET # BLD AUTO: 233 10*3/MM3 (ref 140–450)
PMV BLD AUTO: 9.9 FL (ref 6–12)
POTASSIUM SERPL-SCNC: 3.8 MMOL/L (ref 3.5–5.2)
PROT SERPL-MCNC: 7.4 G/DL (ref 6–8.5)
RBC # BLD AUTO: 5.6 10*6/MM3 (ref 4.14–5.8)
SODIUM SERPL-SCNC: 137 MMOL/L (ref 136–145)
WBC NRBC COR # BLD AUTO: 8.18 10*3/MM3 (ref 3.4–10.8)

## 2025-01-16 PROCEDURE — 80053 COMPREHEN METABOLIC PANEL: CPT

## 2025-01-16 PROCEDURE — 85025 COMPLETE CBC W/AUTO DIFF WBC: CPT

## 2025-01-16 PROCEDURE — 36415 COLL VENOUS BLD VENIPUNCTURE: CPT

## 2025-01-16 NOTE — PROGRESS NOTES
Subjective     CHIEF COMPLAINT:      Chief Complaint   Patient presents with    Follow-up     HISTORY OF PRESENT ILLNESS:     Josiah Loomis is a 40 y.o. male patient who returns today for follow up on his DVT and polycythemia.  He returns today for follow-up reporting that he could not come back to the clinic for follow-up due to deaths of multiple family members in 2024.  He is taking Eliquis 5 mg twice daily.  He is tolerating it without problem with bleeding or bruising.  He is not having chest pain or shortness of breath.  He reports that he has recently increase his activity and started to exercise.    ROS:  Pertinent ROS is in the HPI.     Past medical, surgical, social and family history were reviewed.     MEDICATIONS:    Current Outpatient Medications:     Adalimumab (Humira) 40 MG/0.4ML Prefilled Syringe Kit injection, Inject 0.4 mL under the skin into the appropriate area as directed 1 (One) Time., Disp: , Rfl:     apixaban (Eliquis) 5 MG tablet tablet, Take 1 tablet by mouth 2 (Two) Times a Day., Disp: 60 tablet, Rfl: 1    azaTHIOprine (IMURAN) 50 MG tablet, Take 1 tablet by mouth 2 (Two) Times a Day., Disp: , Rfl:     Continuous Glucose Sensor (FreeStyle Checo 3 Sensor) misc, Use 1 each Every 14 (Fourteen) Days., Disp: 6 each, Rfl: 0    diphenhydrAMINE (BENADRYL) 50 MG capsule, Take 1 capsule by mouth Every 6 (Six) Hours As Needed for Itching., Disp: , Rfl:     dorzolamide-timolol (COSOPT) 2-0.5 % ophthalmic solution, Administer 1 drop to both eyes 2 (Two) Times a Day., Disp: , Rfl:     famotidine (PEPCID) 40 MG tablet, Take 1 tablet by mouth 2 (Two) Times a Day As Needed for Heartburn (nausea)., Disp: 180 tablet, Rfl: 3    glucose blood (Accu-Chek Guide) test strip, 1 each by Other route 4 (Four) Times a Day. Use as instructed. Dx. E11.9, Disp: 400 each, Rfl: 12    insulin aspart (NovoLOG FlexPen ReliOn) 100 UNIT/ML solution pen-injector sc pen, INJECT UP TO 36 UNITS DAILY VIA SLIDING SCALE(3U)150-200,  "6U(201-250), 9U(251-300), 12 USE(301-351),, Disp: 33 mL, Rfl: 3    insulin degludec (Tresiba FlexTouch) 100 UNIT/ML solution pen-injector injection, Inject 11 Units under the skin into the appropriate area as directed Every Night., Disp: 15 mL, Rfl: 5    Insulin Pen Needle (BD Pen Needle Dory 2nd Gen) 32G X 4 MM misc, USE AS DIRECTED FOUR TIMES DAILY, Disp: 400 each, Rfl: 5    Lancets Misc. (Accu-Chek FastClix Lancet) kit, Use 1 each 2 (Two) Times a Day., Disp: 1 kit, Rfl: 1    Lancets misc, Check 4x daily dx iddm ACCU CHECK GLUCOSE METER FAST CLICK LANCETS, Disp: 200 each, Rfl: 3    methylPREDNISolone (MEDROL) 4 MG dose pack, Take as directed on package instructions., Disp: 21 tablet, Rfl: 0    ondansetron (ZOFRAN) 4 MG tablet, Take 1-2 tablets by mouth., Disp: , Rfl:     pantoprazole (PROTONIX) 40 MG EC tablet, Take 1 tablet by mouth Every Morning., Disp: 90 tablet, Rfl: 3    prednisoLONE acetate (PRED FORTE) 1 % ophthalmic suspension, Administer 1 drop to both eyes Daily., Disp: , Rfl:     predniSONE (DELTASONE) 5 MG tablet, Take 1 tablet by mouth Daily., Disp: , Rfl:     rosuvastatin (CRESTOR) 40 MG tablet, Take 1 tablet by mouth Every Night., Disp: 90 tablet, Rfl: 3    Semaglutide, 2 MG/DOSE, (OZEMPIC) 8 MG/3ML solution pen-injector, Inject 2 mg under the skin into the appropriate area as directed 1 (One) Time Per Week., Disp: 9 mL, Rfl: 3    Simbrinza 1-0.2 % suspension, , Disp: , Rfl:     Objective     VITAL SIGNS:     Vitals:    01/16/25 1350   BP: 145/80   Pulse: 82   Resp: 16   Temp: 98.2 °F (36.8 °C)   TempSrc: Oral   SpO2: 95%   Weight: (!) 159 kg (350 lb 3.2 oz)   Height: 188 cm (74.02\")   PainSc: 0-No pain     Body mass index is 44.94 kg/m².     Wt Readings from Last 5 Encounters:   01/16/25 (!) 159 kg (350 lb 3.2 oz)   02/06/24 (!) 155 kg (342 lb)   01/18/24 (!) 155 kg (341 lb 11.2 oz)   12/05/23 (!) 155 kg (341 lb)   11/29/23 (!) 156 kg (345 lb)     PHYSICAL EXAMINATION:   GENERAL: The patient " appears in good general condition, not in acute distress.   SKIN: No Ecchymosis.  EYES: No jaundice. No Pallor.  CHEST: Normal respiratory effort. Normal breathing sounds bilaterally. No added sounds.  CVS: Normal S1 and S2. No Murmur.  ABDOMEN: Obese.  EXTREMITIES: No leg edema.  No calf tenderness.  Exam of the left foot did not reveal any abnormalities.  Normal pulses.  No erythema or warmth on exam of the big toe.    DIAGNOSTIC DATA:     Results from last 7 days   Lab Units 01/16/25  1335   WBC 10*3/mm3 8.18   NEUTROS ABS 10*3/mm3 5.23   HEMOGLOBIN g/dL 15.1   HEMATOCRIT % 44.8   PLATELETS 10*3/mm3 233     Results from last 7 days   Lab Units 01/16/25  1335   SODIUM mmol/L 137   POTASSIUM mmol/L 3.8   CHLORIDE mmol/L 101   CO2 mmol/L 24.0   BUN mg/dL 13   CREATININE mg/dL 1.20   CALCIUM mg/dL 9.3   ALBUMIN g/dL 4.0   BILIRUBIN mg/dL 0.7   ALK PHOS U/L 137*   ALT (SGPT) U/L 39   AST (SGOT) U/L 47*   GLUCOSE mg/dL 110*     Venous Doppler left lower extremity on 2/27/2024:   Normal left lower extremity venous duplex scan.     Assessment & Plan    *Acute deep vein thrombosis involving the left posterior tibial vein.  He was diagnosed on 11/19/2023.  The diagnosis was made after the patient had chest pain.  CT angiogram chest revealed no definite PE.  Small PE could not be excluded.  The patient did not have left leg pain or swelling.  His risk factors were obesity, decreased activity, secondary polycythemia and inflammation due to his underlying autoimmune disease.  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  Venous Doppler on 2/27/2024 revealed no DVT.  He continues Eliquis 5 mg twice daily.  He is not having problem with bleeding or bruising.  I explained that the current anticoagulation is preventative to decrease the risk of developing deep vein thrombosis and pulmonary embolism.  This was recommended to the patient despite resolution of the DVT due to his  continued to risk due to his obesity.  5 mg dose was selected due to his weight-159 kg today.  He expresses understanding.     *Polycythemia.  This is likely secondary polycythemia attributed to morbid obesity.  1/18/2024: Hemoglobin 16.3.  Hematocrit 48.6%.    1/16/2025: Hemoglobin 15.1.  Hematocrit 44.8%.     *Lung nodules.  CT on 11/18/2023 revealed multiple small vague nodular foci of consolidation in the right upper lobe.  This was suspected of being atypical pneumonia.  I recommended repeating CT scan as recommended by the radiologist.  However, the patient did not have the CT scan done.  He canceled his follow-up appointment.  I recommended obtaining a CT scan of the chest to reevaluate the lung findings.    *Elevated liver enzymes.  1/16/2025: Alkaline phosphatase 137.  AST 47.  ALT 39.  Review of previous labs, he had previously elevated liver enzymes which have improved.    PLAN:     1.  Continue Eliquis 5 mg twice daily.  2.  Obtain CT scan of the chest within 1 month.  3.  We discussed losing weight, increasing activity and maintaining adequate hydration.  4.  We will contact him with the result of the CT scan.  If it does not show any suspicious findings, we will see him in follow-up in 1 year with CBC and CMP.       Angelina Noyola MD  01/16/25

## 2025-01-21 ENCOUNTER — HOSPITAL ENCOUNTER (EMERGENCY)
Facility: HOSPITAL | Age: 40
Discharge: HOME | End: 2025-01-21
Attending: EMERGENCY MEDICINE | Admitting: EMERGENCY MEDICINE
Payer: COMMERCIAL

## 2025-01-21 VITALS
DIASTOLIC BLOOD PRESSURE: 128 MMHG | TEMPERATURE: 98 F | RESPIRATION RATE: 18 BRPM | OXYGEN SATURATION: 99 % | HEART RATE: 94 BPM | HEIGHT: 72 IN | SYSTOLIC BLOOD PRESSURE: 191 MMHG | BODY MASS INDEX: 42.66 KG/M2 | WEIGHT: 315 LBS

## 2025-01-21 DIAGNOSIS — R03.0 ELEVATED BLOOD PRESSURE READING: Primary | ICD-10-CM

## 2025-01-21 PROCEDURE — 99282 EMERGENCY DEPT VISIT SF MDM: CPT

## 2025-01-21 NOTE — ED TRIAGE NOTES
Patient to ED via pv from dentist. Patient was going to have a tooth pulled but dentist reported that his BP was too elevated. Patient called PCP who told him to go to ED. Patient denies symptoms currently.    24

## 2025-01-21 NOTE — ED PROVIDER NOTES
EMERGENCY DEPARTMENT ENCOUNTER    Room Number:  39/39  PCP: Kodi Rosario DO    HPI:  Chief Complaint: Elevated blood pressure  A complete HPI/ROS/PMH/PSH/SH/FH are unobtainable due to: None  Context: Josiah Loomis is a 40 y.o. male who presents to the ED c/o acute blood pressure.  He was in a get his molar removed today but his blood pressure was as high as 190 systolic.  Therefore he was sent here for evaluation after consulting with his PCP.  He states that he is asymptomatic with no headache, chest pain, shortness of breath.  He states that he is not on blood pressure cuff but he states the last time he was at his PCP office that his blood pressure was normal.      PAST MEDICAL HISTORY  Active Ambulatory Problems     Diagnosis Date Noted    Sprain of acromioclavicular ligament 05/26/2016    Atopic dermatitis 05/26/2016    Essential hypertension 05/26/2016    Dyslipidemia 05/26/2016    Abdominal hernia 05/26/2016    Morbidly obese 11/16/2018    Type 2 diabetes mellitus without complication, with long-term current use of insulin 08/22/2019    Hyperlipidemia 08/20/2020    Abnormal liver function tests 03/11/2021    Autoimmune hepatitis 04/01/2022    Cataract extraction status of left eye 12/03/2021    Cataract of right eye 04/01/2022    Cystoid macular edema 03/08/2021    Diabetes mellitus 12/03/2020    Disorder due to insertion of glaucoma drainage device 10/08/2021    Gastroesophageal reflux disease 03/11/2021    High gamma glutamyl transferase (GGT) 12/03/2020    Other specified postprocedural states 03/11/2021    Other states following surgery of eye and adnexa 09/22/2021    Pseudophakia of left eye 03/05/2021    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    Sarcoidosis 03/05/2021    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    Uveitis 12/03/2020    Exotropia of right eye 01/30/2023    Fever 11/15/2023    Allergic reaction caused by a drug 11/16/2023    Acute DVT (deep venous thrombosis) 11/20/2023    Classic choroidal  neovascular membrane 01/30/2024     Resolved Ambulatory Problems     Diagnosis Date Noted    Epigastric pain 11/18/2023     Past Medical History:   Diagnosis Date    Cataract     GERD (gastroesophageal reflux disease)     Glaucoma     History of cataract     History of iridocyclitis     Hypertension     Peptic ulceration          PAST SURGICAL HISTORY  Past Surgical History:   Procedure Laterality Date    CATARACT EXTRACTION      ENDOSCOPY N/A 11/19/2023    Procedure: ESOPHAGOGASTRODUODENOSCOPY with cold biopsies;  Surgeon: Dwight Witt MD;  Location: [facility] ENDOSCOPY;  Service: Gastroenterology;  Laterality: N/A;  pre: epigastric pain  post: gastritis    EYE SURGERY      INTRAOCULAR LENS INSERTION      IRIDOTOMY / IRIDECTOMY      TESTICLE SURGERY  2008         FAMILY HISTORY  Family History   Problem Relation Age of Onset    Crohn's disease Father     Diabetes Maternal Grandmother     Diabetes Paternal Grandmother          SOCIAL HISTORY  Social History     Socioeconomic History    Marital status:      Spouse name: Brigid   Tobacco Use    Smoking status: Never     Passive exposure: Never    Smokeless tobacco: Never   Vaping Use    Vaping status: Former    Substances: THC    Devices: Pre-filled or refillable cartridge   Substance and Sexual Activity    Alcohol use: Yes     Comment: rarely    Drug use: Yes     Frequency: 1.0 times per week     Types: Marijuana     Comment: smokes daily per pt    Sexual activity: Yes     Partners: Female         ALLERGIES  Zofran [ondansetron]        REVIEW OF SYSTEMS  Review of Systems     Included in HPI  All systems reviewed and negative except for those discussed in HPI.       PHYSICAL EXAM  ED Triage Vitals   Temp Heart Rate Resp BP SpO2   01/21/25 1040 01/21/25 1040 01/21/25 1040 01/21/25 1052 01/21/25 1040   98 °F (36.7 °C) 95 18 (!) 188/118 99 %      Temp src Heart Rate Source Patient Position BP Location FiO2 (%)   -- -- -- -- --              Physical  Exam      GENERAL: no acute distress  HENT: nares patent  EYES: no scleral icterus  CV: regular rhythm, normal rate  RESPIRATORY: normal effort, clear to auscultation bilaterally  ABDOMEN: soft, obese abdomen, nontender  MUSCULOSKELETAL: no deformity  NEURO: alert, moves all extremities, follows commands  PSYCH:  calm, cooperative  SKIN: warm, dry    Vital signs and nursing notes reviewed.          LAB RESULTS  No results found for this or any previous visit (from the past 24 hours).    Ordered the above labs and reviewed the results.        RADIOLOGY  Fundus Photos - OU - Both Eyes    Result Date: 1/20/2025  Right Eye Progression has been stable. Disc findings include normal observations. Left Eye Progression has improved. Macula findings include edema, exudates.     OCT, Retina - OU - Both Eyes    Result Date: 1/20/2025  Right Eye Quality was good. Scan locations included Subfoveal. Progression has been stable. Findings include Normal Foveal Contour, Epiretinal Membrane. Left Eye Quality was good. Scan locations included Subfoveal. Progression has worsened. Findings include Increased retinal thickness, Intra-retinal Fluid, Subretinal fibrosis, Subretinal hyper-reflective material.      Ordered the above noted radiological studies. Reviewed by me in PACS.        MEDICATIONS GIVEN IN ER  Medications - No data to display      ORDERS PLACED DURING THIS VISIT:  Orders Placed This Encounter   Procedures    Monitor Blood Pressure         OUTPATIENT MEDICATION MANAGEMENT:  No current Epic-ordered facility-administered medications on file.     Current Outpatient Medications Ordered in Epic   Medication Sig Dispense Refill    Adalimumab (Humira) 40 MG/0.4ML Prefilled Syringe Kit injection Inject 0.4 mL under the skin into the appropriate area as directed 1 (One) Time.      apixaban (Eliquis) 5 MG tablet tablet Take 1 tablet by mouth 2 (Two) Times a Day. 60 tablet 1    azaTHIOprine (IMURAN) 50 MG tablet Take 1 tablet by  mouth 2 (Two) Times a Day.      Continuous Glucose Sensor (FreeStyle Checo 3 Sensor) misc Use 1 each Every 14 (Fourteen) Days. 6 each 0    diphenhydrAMINE (BENADRYL) 50 MG capsule Take 1 capsule by mouth Every 6 (Six) Hours As Needed for Itching.      dorzolamide-timolol (COSOPT) 2-0.5 % ophthalmic solution Administer 1 drop to both eyes 2 (Two) Times a Day.      famotidine (PEPCID) 40 MG tablet Take 1 tablet by mouth 2 (Two) Times a Day As Needed for Heartburn (nausea). 180 tablet 3    glucose blood (Accu-Chek Guide) test strip 1 each by Other route 4 (Four) Times a Day. Use as instructed. Dx. E11.9 400 each 12    insulin aspart (NovoLOG FlexPen ReliOn) 100 UNIT/ML solution pen-injector sc pen INJECT UP TO 36 UNITS DAILY VIA SLIDING SCALE(3U)150-200, 6U(201-250), 9U(251-300), 12 USE(301-351), 33 mL 3    insulin degludec (Tresiba FlexTouch) 100 UNIT/ML solution pen-injector injection Inject 11 Units under the skin into the appropriate area as directed Every Night. 15 mL 5    Insulin Pen Needle (BD Pen Needle Dory 2nd Gen) 32G X 4 MM misc USE AS DIRECTED FOUR TIMES DAILY 400 each 5    Lancets Misc. (Accu-Chek FastClix Lancet) kit Use 1 each 2 (Two) Times a Day. 1 kit 1    Lancets misc Check 4x daily dx iddm ACCU CHECK GLUCOSE METER FAST CLICK LANCETS 200 each 3    methylPREDNISolone (MEDROL) 4 MG dose pack Take as directed on package instructions. 21 tablet 0    ondansetron (ZOFRAN) 4 MG tablet Take 1-2 tablets by mouth.      pantoprazole (PROTONIX) 40 MG EC tablet Take 1 tablet by mouth Every Morning. 90 tablet 3    prednisoLONE acetate (PRED FORTE) 1 % ophthalmic suspension Administer 1 drop to both eyes Daily.      predniSONE (DELTASONE) 5 MG tablet Take 1 tablet by mouth Daily.      rosuvastatin (CRESTOR) 40 MG tablet Take 1 tablet by mouth Every Night. 90 tablet 3    Semaglutide, 2 MG/DOSE, (OZEMPIC) 8 MG/3ML solution pen-injector Inject 2 mg under the skin into the appropriate area as directed 1 (One) Time Per  Week. 9 mL 3    Simbrinza 1-0.2 % suspension          PROCEDURES  Procedures          MEDICAL DECISION MAKING, PROGRESS, and CONSULTS    Discussion below represents my analysis of pertinent findings related to patient's condition, differential diagnosis, treatment plan and final disposition.      Differential diagnosis:    Primary hypertension, whitecoat syndrome                 Independent interpretation of labs, radiology studies, and discussions with consultants:  ED Course as of 01/21/25 1204   Tue Jan 21, 2025   1147 Medical chart review, patient had labs performed 5 days ago.  Creatinine was 1.2.  Potassium was 3.8. [TD]   1202 On medical chart review, patient was seen by oncology on 1/16/2025.  Blood pressure was 145/80.  Patient was seen on 2/6/2024 with blood pressure 132/90. [TD]   1202 Blood pressures currently in the 160s systolic. [TD]      ED Course User Index  [TD] Janes Peterson II, MD         Discussed with the patient empiric initiation of blood pressure medication versus further monitoring at home.  He prefers to monitor his blood pressure more regularly and then if remains elevated to then talk with his PCP about starting on antihypertensive therapy.  There is no urgent need for his tooth to be extracted as he is currently asymptomatic from that as well.  We discussed return precautions, specifically should he develop any symptoms associated with his hypertension such as headache, chest pain, shortness of breath.        DIAGNOSIS  Final diagnoses:   Elevated blood pressure reading         DISPOSITION  DISCHARGE    FOLLOW-UP  Kodi Rosario, DO  [address] [id number] [phone]    Schedule an appointment as soon as possible for a visit in 1 week           Medication List      No changes were made to your prescriptions during this visit.             Latest Documented Vital Signs:  As of 12:04 EST  BP- (!) 188/118 HR- 95 Temp- 98 °F (36.7 °C) O2 sat-  99%      --    Please note that portions of this were completed with a voice recognition program.       Note Disclaimer: At [facility], we believe that sharing information builds trust and better relationships. You are receiving this note because you are receiving care at [facility] or recently visited. It is possible you will see health information before a provider has talked with you about it. This kind of information can be easy to misunderstand. To help you fully understand what it means for your health, we urge you to discuss this note with your provider.         Janes Peterson II, MD  01/21/25 [id number]

## 2025-01-28 ENCOUNTER — OFFICE VISIT (OUTPATIENT)
Dept: FAMILY MEDICINE CLINIC | Facility: CLINIC | Age: 40
End: 2025-01-28
Payer: COMMERCIAL

## 2025-01-28 VITALS
WEIGHT: 315 LBS | HEART RATE: 70 BPM | BODY MASS INDEX: 42.66 KG/M2 | DIASTOLIC BLOOD PRESSURE: 108 MMHG | SYSTOLIC BLOOD PRESSURE: 162 MMHG | HEIGHT: 72 IN | OXYGEN SATURATION: 97 %

## 2025-01-28 DIAGNOSIS — E78.5 DYSLIPIDEMIA: ICD-10-CM

## 2025-01-28 DIAGNOSIS — Z79.899 DRUG THERAPY: ICD-10-CM

## 2025-01-28 DIAGNOSIS — Z79.4 TYPE 2 DIABETES MELLITUS WITH OTHER OPHTHALMIC COMPLICATION, WITH LONG-TERM CURRENT USE OF INSULIN: Primary | ICD-10-CM

## 2025-01-28 DIAGNOSIS — I10 PRIMARY HYPERTENSION: ICD-10-CM

## 2025-01-28 DIAGNOSIS — H20.9 UVEITIS: ICD-10-CM

## 2025-01-28 DIAGNOSIS — E11.39 TYPE 2 DIABETES MELLITUS WITH OTHER OPHTHALMIC COMPLICATION, WITH LONG-TERM CURRENT USE OF INSULIN: Primary | ICD-10-CM

## 2025-01-28 PROCEDURE — 99214 OFFICE O/P EST MOD 30 MIN: CPT | Performed by: FAMILY MEDICINE

## 2025-01-28 RX ORDER — ADALIMUMAB-ADAZ 40 MG/.4ML
40 INJECTION, SOLUTION SUBCUTANEOUS
COMMUNITY
Start: 2025-01-28

## 2025-01-28 RX ORDER — LATANOPROST 50 UG/ML
1 SOLUTION/ DROPS OPHTHALMIC NIGHTLY
COMMUNITY

## 2025-01-28 RX ORDER — LISINOPRIL 40 MG/1
40 TABLET ORAL DAILY
Qty: 30 TABLET | Refills: 5 | Status: SHIPPED | OUTPATIENT
Start: 2025-01-28

## 2025-01-28 RX ORDER — ACETAZOLAMIDE 500 MG/1
500 CAPSULE, EXTENDED RELEASE ORAL 2 TIMES DAILY
COMMUNITY
Start: 2025-01-17 | End: 2025-02-16

## 2025-02-08 NOTE — PROGRESS NOTES
Subjective   Josiah oLomis is a 40 y.o. male. Presents today for   Chief Complaint   Patient presents with    Diabetes    Hyperlipidemia    Hypertension    Hospital Follow Up Visit     [facility]        Diabetes  Pertinent negatives for hypoglycemia include no headaches or speech difficulty. Associated symptoms include polydipsia and polyuria. Pertinent negatives for diabetes include no chest pain and no weakness.   Hyperlipidemia  Pertinent negatives include no chest pain or shortness of breath.   Hypertension  Pertinent negatives include no chest pain, headaches, palpitations or shortness of breath.     Patient 39 y/o with dm2, htn, hld here for f/u of chronic conditions and ED for uncontrolled htn;  BP running high;  no cp/soa; no headaches;  sees ophthalmology for uveitis reports currently stable.     Review of Systems   Eyes:  Positive for visual disturbance.   Respiratory:  Negative for shortness of breath.    Cardiovascular:  Negative for chest pain and palpitations.   Endocrine: Positive for polydipsia and polyuria.   Neurological:  Negative for syncope, facial asymmetry, speech difficulty, weakness and headaches.       Patient Active Problem List   Diagnosis    Sprain of acromioclavicular ligament    Atopic dermatitis    Essential hypertension    Dyslipidemia    Abdominal hernia    Morbidly obese    Type 2 diabetes mellitus without complication, with long-term current use of insulin    Hyperlipidemia    Abnormal liver function tests    Autoimmune hepatitis    Cataract extraction status of left eye    Cataract of right eye    Cystoid macular edema    Diabetes mellitus    Disorder due to insertion of glaucoma drainage device    Gastroesophageal reflux disease    High gamma glutamyl transferase (GGT)    Other specified postprocedural states    Other states following surgery of eye and adnexa    Pseudophakia of left eye    Panuveitis of both eyes    Sarcoidosis    Uveitic glaucoma    Uveitis    Exotropia of right  "eye    Fever    Allergic reaction caused by a drug    Acute DVT (deep venous thrombosis)    Classic choroidal neovascular membrane       Social History     Socioeconomic History    Marital status:      Spouse name: Brigid   Tobacco Use    Smoking status: Never     Passive exposure: Never    Smokeless tobacco: Never   Vaping Use    Vaping status: Former    Substances: THC    Devices: Pre-filled or refillable cartridge   Substance and Sexual Activity    Alcohol use: Yes     Comment: rarely    Drug use: Yes     Frequency: 1.0 times per week     Types: Marijuana     Comment: smokes daily per pt    Sexual activity: Yes     Partners: Female       Allergies   Allergen Reactions    Zofran [Ondansetron] Itching     IV form only         Objective   Vitals:    01/28/25 1502   BP: (!) 162/108   Pulse: 70   SpO2: 97%   Weight: (!) 154 kg (340 lb)   Height: 182.9 cm (72\")     Body mass index is 46.11 kg/m².    Physical Exam  Vitals and nursing note reviewed.   Constitutional:       Appearance: He is well-developed.   HENT:      Head: Normocephalic and atraumatic.   Neck:      Thyroid: No thyromegaly.      Vascular: No JVD.   Cardiovascular:      Rate and Rhythm: Normal rate and regular rhythm.      Heart sounds: Normal heart sounds. No murmur heard.     No friction rub. No gallop.   Pulmonary:      Effort: Pulmonary effort is normal. No respiratory distress.      Breath sounds: Normal breath sounds. No wheezing or rales.   Abdominal:      General: Bowel sounds are normal. There is no distension.      Palpations: Abdomen is soft.      Tenderness: There is no abdominal tenderness. There is no guarding or rebound.   Musculoskeletal:      Cervical back: Neck supple.   Skin:     General: Skin is warm and dry.   Neurological:      Mental Status: He is alert.      Gait: Gait normal.   Psychiatric:         Behavior: Behavior normal.         Assessment & Plan   Diagnoses and all orders for this visit:    1. Type 2 diabetes mellitus " with other ophthalmic complication, with long-term current use of insulin (Primary)  -     lisinopril (PRINIVIL,ZESTRIL) 40 MG tablet; Take 1 tablet by mouth Daily.  Dispense: 30 tablet; Refill: 5  -     Cancel: Microalbumin / Creatinine Urine Ratio - Urine, Clean Catch  -     Cancel: Lipid Panel  -     Cancel: Comprehensive Metabolic Panel  -     Cancel: Hemoglobin A1c  -     Comprehensive Metabolic Panel  -     Hemoglobin A1c  -     Lipid Panel  -     Microalbumin / Creatinine Urine Ratio - Urine, Clean Catch    2. Dyslipidemia  -     Cancel: Lipid Panel  -     Lipid Panel    3. Uveitis    4. Primary hypertension  -     lisinopril (PRINIVIL,ZESTRIL) 40 MG tablet; Take 1 tablet by mouth Daily.  Dispense: 30 tablet; Refill: 5  -     Cancel: Comprehensive Metabolic Panel  -     Comprehensive Metabolic Panel    5. Drug therapy  -     ABO/Rh    Uveiitis - see eye doctor as doing  Uncontrolled bp - will titrate up acei;  keep bp log  -dm2 - continue medications, recheck labs  -HLD - continue statin, recheck lipids.  -requests check blood type           Class 3 Severe Obesity (BMI >=40). Obesity-related health conditions include the following: hypertension, diabetes mellitus, and dyslipidemias. Obesity is unchanged. BMI is is above average; BMI management plan is completed. We discussed portion control and increasing exercise.     -Follow up: 2 months and prn     ________________________________________  Kodi Rosario DO, MS    Current Outpatient Medications on File Prior to Visit   Medication Sig Dispense Refill    acetaZOLAMIDE (DIAMOX) 500 MG capsule Take 1 capsule by mouth 2 (Two) Times a Day.      Adalimumab-adaz 40 MG/0.4ML solution auto-injector Inject 40 mg under the skin into the appropriate area as directed Every 14 (Fourteen) Days.      apixaban (Eliquis) 5 MG tablet tablet Take 1 tablet by mouth 2 (Two) Times a Day. 60 tablet 1    azaTHIOprine (IMURAN) 50 MG tablet Take 1 tablet by mouth 2 (Two) Times  a Day.      Continuous Glucose Sensor (FreeStyle Checo 3 Sensor) misc Use 1 each Every 14 (Fourteen) Days. 6 each 0    diphenhydrAMINE (BENADRYL) 50 MG capsule Take 1 capsule by mouth Every 6 (Six) Hours As Needed for Itching.      dorzolamide-timolol (COSOPT) 2-0.5 % ophthalmic solution Administer 1 drop to both eyes 2 (Two) Times a Day.      famotidine (PEPCID) 40 MG tablet Take 1 tablet by mouth 2 (Two) Times a Day As Needed for Heartburn (nausea). 180 tablet 3    glucose blood (Accu-Chek Guide) test strip 1 each by Other route 4 (Four) Times a Day. Use as instructed. Dx. E11.9 400 each 12    insulin aspart (NovoLOG FlexPen ReliOn) 100 UNIT/ML solution pen-injector sc pen INJECT UP TO 36 UNITS DAILY VIA SLIDING SCALE(3U)150-200, 6U(201-250), 9U(251-300), 12 USE(301-351), 33 mL 3    insulin degludec (Tresiba FlexTouch) 100 UNIT/ML solution pen-injector injection Inject 11 Units under the skin into the appropriate area as directed Every Night. 15 mL 5    Insulin Pen Needle (BD Pen Needle Dory 2nd Gen) 32G X 4 MM misc USE AS DIRECTED FOUR TIMES DAILY 400 each 5    Lancets Misc. (Accu-Chek FastClix Lancet) kit Use 1 each 2 (Two) Times a Day. 1 kit 1    Lancets misc Check 4x daily dx iddm ACCU CHECK GLUCOSE METER FAST CLICK LANCETS 200 each 3    latanoprost (XALATAN) 0.005 % ophthalmic solution Administer 1 drop to both eyes Every Night.      ondansetron (ZOFRAN) 4 MG tablet Take 1-2 tablets by mouth.      pantoprazole (PROTONIX) 40 MG EC tablet Take 1 tablet by mouth Every Morning. 90 tablet 3    prednisoLONE acetate (PRED FORTE) 1 % ophthalmic suspension Administer 1 drop to both eyes Daily.      rosuvastatin (CRESTOR) 40 MG tablet Take 1 tablet by mouth Every Night. 90 tablet 3    Simbrinza 1-0.2 % suspension        No current facility-administered medications on file prior to visit.

## 2025-03-13 LAB
ALBUMIN SERPL-MCNC: 4.3 G/DL (ref 3.5–5.2)
ALBUMIN/GLOB SERPL: 1.3 G/DL
ALP SERPL-CCNC: 165 U/L (ref 39–117)
ALT SERPL-CCNC: 42 U/L (ref 1–41)
AST SERPL-CCNC: 52 U/L (ref 1–40)
BILIRUB SERPL-MCNC: 0.7 MG/DL (ref 0–1.2)
BUN SERPL-MCNC: 9 MG/DL (ref 6–20)
BUN/CREAT SERPL: 8 (ref 7–25)
CALCIUM SERPL-MCNC: 10 MG/DL (ref 8.6–10.5)
CHLORIDE SERPL-SCNC: 102 MMOL/L (ref 98–107)
CHOLEST SERPL-MCNC: 197 MG/DL (ref 0–200)
CO2 SERPL-SCNC: 28.4 MMOL/L (ref 22–29)
CREAT SERPL-MCNC: 1.12 MG/DL (ref 0.76–1.27)
EGFRCR SERPLBLD CKD-EPI 2021: 86.2 ML/MIN/1.73
GLOBULIN SER CALC-MCNC: 3.2 GM/DL
GLUCOSE SERPL-MCNC: 97 MG/DL (ref 65–99)
HBA1C MFR BLD: 6.7 % (ref 4.8–5.6)
HDLC SERPL-MCNC: 57 MG/DL (ref 40–60)
LDLC SERPL CALC-MCNC: 120 MG/DL (ref 0–100)
POTASSIUM SERPL-SCNC: 4.3 MMOL/L (ref 3.5–5.2)
PROT SERPL-MCNC: 7.5 G/DL (ref 6–8.5)
SODIUM SERPL-SCNC: 141 MMOL/L (ref 136–145)
TRIGL SERPL-MCNC: 114 MG/DL (ref 0–150)
VLDLC SERPL CALC-MCNC: 20 MG/DL (ref 5–40)

## 2025-03-17 DIAGNOSIS — R79.89 ELEVATED LFTS: Primary | ICD-10-CM

## 2025-03-18 ENCOUNTER — OFFICE VISIT (OUTPATIENT)
Dept: FAMILY MEDICINE CLINIC | Facility: CLINIC | Age: 40
End: 2025-03-18
Payer: COMMERCIAL

## 2025-03-18 VITALS
SYSTOLIC BLOOD PRESSURE: 168 MMHG | DIASTOLIC BLOOD PRESSURE: 110 MMHG | BODY MASS INDEX: 42.66 KG/M2 | HEART RATE: 80 BPM | WEIGHT: 315 LBS | HEIGHT: 72 IN | OXYGEN SATURATION: 98 %

## 2025-03-18 DIAGNOSIS — E11.9 ENCOUNTER FOR DIABETIC FOOT EXAM: ICD-10-CM

## 2025-03-18 DIAGNOSIS — R53.83 OTHER FATIGUE: ICD-10-CM

## 2025-03-18 DIAGNOSIS — I1A.0 RESISTANT HYPERTENSION: Primary | ICD-10-CM

## 2025-03-18 RX ORDER — AMLODIPINE BESYLATE 10 MG/1
10 TABLET ORAL DAILY
Qty: 30 TABLET | Refills: 5 | Status: SHIPPED | OUTPATIENT
Start: 2025-03-18

## 2025-03-18 NOTE — PROGRESS NOTES
Subjective   Josiah Loomis is a 40 y.o. male. Presents today for   Chief Complaint   Patient presents with    Hypertension    Heartburn    Diabetes         History of Present Illness  Patient 41 y/o male with dm2 and htn, here for bp f/u as been running high;  changed diet, workign out 3 days a week, cut out soda an dfried foods and last ov went up on lisinopril.   Does have fatigue, never had sleep study;  Reports to start remicaide as humira not supppressing ;  no heartburn, wonders if needs famotidine as not taking.    History of Present Illness    Review of Systems   Constitutional:  Positive for fatigue.   Respiratory:  Negative for shortness of breath.    Cardiovascular:  Negative for chest pain and palpitations.       Patient Active Problem List   Diagnosis    Sprain of acromioclavicular ligament    Atopic dermatitis    Essential hypertension    Dyslipidemia    Abdominal hernia    Morbidly obese    Type 2 diabetes mellitus without complication, with long-term current use of insulin    Hyperlipidemia    Abnormal liver function tests    Autoimmune hepatitis    Cataract extraction status of left eye    Cataract of right eye    Cystoid macular edema    Diabetes mellitus    Disorder due to insertion of glaucoma drainage device    Gastroesophageal reflux disease    High gamma glutamyl transferase (GGT)    Other specified postprocedural states    Other states following surgery of eye and adnexa    Pseudophakia of left eye    Panuveitis of both eyes    Sarcoidosis    Uveitic glaucoma    Uveitis    Exotropia of right eye    Fever    Allergic reaction caused by a drug    Acute DVT (deep venous thrombosis)    Classic choroidal neovascular membrane       Social History     Socioeconomic History    Marital status:      Spouse name: Brigid   Tobacco Use    Smoking status: Never     Passive exposure: Never    Smokeless tobacco: Never   Vaping Use    Vaping status: Former    Substances: THC    Devices: Pre-filled or  "refillable cartridge   Substance and Sexual Activity    Alcohol use: Yes     Comment: rarely    Drug use: Yes     Frequency: 1.0 times per week     Types: Marijuana     Comment: smokes daily per pt    Sexual activity: Yes     Partners: Female       Allergies   Allergen Reactions    Zofran [Ondansetron] Itching     IV form only         Objective   Vitals:    03/18/25 0927   BP: (!) 168/110   Pulse: 80   SpO2: 98%   Weight: (!) 153 kg (338 lb)   Height: 182.9 cm (72\")     Body mass index is 45.84 kg/m².    Physical Exam  Vitals and nursing note reviewed.   Constitutional:       Appearance: He is well-developed.   Musculoskeletal:      Cervical back: Neck supple.   Feet:      Comments: Diabetic foot exam and monofilament completed, see scanned report.          Skin:     General: Skin is warm and dry.   Neurological:      Mental Status: He is alert.   Psychiatric:         Behavior: Behavior normal.         Results       Assessment & Plan   Diagnoses and all orders for this visit:    1. Resistant hypertension (Primary)  -     Duplex Renal Artery - Bilateral Complete CAR; Future  -     Aldosterone / Renin Ratio  -     Ambulatory Referral to Sleep Medicine  -     amLODIPine (NORVASC) 10 MG tablet; Take 1 tablet by mouth Daily.  Dispense: 30 tablet; Refill: 5    2. Other fatigue  -     Ambulatory Referral to Sleep Medicine    3. Encounter for diabetic foot exam    Other orders  -     apixaban (Eliquis) 5 MG tablet tablet; Take 1 tablet by mouth 2 (Two) Times a Day.  Dispense: 60 tablet; Refill: 5    Resistant htn - will check duplex renal, aldsoterone/renin and recommend see sleep specialist  Will start amloidpine and continue lisinopril       Assessment & Plan            -Follow up: 6 to 8 weeks and prn     ________________________________________  Kodi Rosario DO, MS    Current Outpatient Medications on File Prior to Visit   Medication Sig Dispense Refill    Adalimumab-adaz 40 MG/0.4ML solution auto-injector " Inject 40 mg under the skin into the appropriate area as directed Every 14 (Fourteen) Days.      apixaban (Eliquis) 5 MG tablet tablet Take 1 tablet by mouth 2 (Two) Times a Day. 60 tablet 1    azaTHIOprine (IMURAN) 50 MG tablet Take 1 tablet by mouth 2 (Two) Times a Day.      Continuous Glucose Sensor (FreeStyle Checo 3 Sensor) misc Use 1 each Every 14 (Fourteen) Days. 6 each 0    diphenhydrAMINE (BENADRYL) 50 MG capsule Take 1 capsule by mouth Every 6 (Six) Hours As Needed for Itching.      dorzolamide-timolol (COSOPT) 2-0.5 % ophthalmic solution Administer 1 drop to both eyes 2 (Two) Times a Day.      famotidine (PEPCID) 40 MG tablet Take 1 tablet by mouth 2 (Two) Times a Day As Needed for Heartburn (nausea). 180 tablet 3    glucose blood (Accu-Chek Guide) test strip 1 each by Other route 4 (Four) Times a Day. Use as instructed. Dx. E11.9 400 each 12    insulin aspart (NovoLOG FlexPen ReliOn) 100 UNIT/ML solution pen-injector sc pen INJECT UP TO 36 UNITS DAILY VIA SLIDING SCALE(3U)150-200, 6U(201-250), 9U(251-300), 12 USE(301-351), 33 mL 3    insulin degludec (Tresiba FlexTouch) 100 UNIT/ML solution pen-injector injection Inject 11 Units under the skin into the appropriate area as directed Every Night. 15 mL 5    Insulin Pen Needle (BD Pen Needle Dory 2nd Gen) 32G X 4 MM misc USE AS DIRECTED FOUR TIMES DAILY 400 each 5    Lancets Misc. (Accu-Chek FastClix Lancet) kit Use 1 each 2 (Two) Times a Day. 1 kit 1    Lancets misc Check 4x daily dx iddm ACCU CHECK GLUCOSE METER FAST CLICK LANCETS 200 each 3    latanoprost (XALATAN) 0.005 % ophthalmic solution Administer 1 drop to both eyes Every Night.      lisinopril (PRINIVIL,ZESTRIL) 40 MG tablet Take 1 tablet by mouth Daily. 30 tablet 5    ondansetron (ZOFRAN) 4 MG tablet Take 1-2 tablets by mouth.      pantoprazole (PROTONIX) 40 MG EC tablet Take 1 tablet by mouth Every Morning. 90 tablet 3    prednisoLONE acetate (PRED FORTE) 1 % ophthalmic suspension Administer 1  drop to both eyes Daily.      rosuvastatin (CRESTOR) 40 MG tablet Take 1 tablet by mouth Every Night. 90 tablet 3    Simbrinza 1-0.2 % suspension        No current facility-administered medications on file prior to visit.

## 2025-08-08 DIAGNOSIS — Z79.4 TYPE 2 DIABETES MELLITUS WITH HYPERGLYCEMIA, WITH LONG-TERM CURRENT USE OF INSULIN: ICD-10-CM

## 2025-08-08 DIAGNOSIS — I10 PRIMARY HYPERTENSION: ICD-10-CM

## 2025-08-08 DIAGNOSIS — E11.39 TYPE 2 DIABETES MELLITUS WITH OTHER OPHTHALMIC COMPLICATION, WITH LONG-TERM CURRENT USE OF INSULIN: ICD-10-CM

## 2025-08-08 DIAGNOSIS — Z79.4 TYPE 2 DIABETES MELLITUS WITH OTHER OPHTHALMIC COMPLICATION, WITH LONG-TERM CURRENT USE OF INSULIN: ICD-10-CM

## 2025-08-08 DIAGNOSIS — E11.65 TYPE 2 DIABETES MELLITUS WITH HYPERGLYCEMIA, WITH LONG-TERM CURRENT USE OF INSULIN: ICD-10-CM

## 2025-08-08 RX ORDER — LISINOPRIL 40 MG/1
40 TABLET ORAL DAILY
Qty: 30 TABLET | Refills: 0 | Status: SHIPPED | OUTPATIENT
Start: 2025-08-08

## 2025-08-08 RX ORDER — ACYCLOVIR 800 MG/1
TABLET ORAL
Qty: 6 EACH | Refills: 0 | Status: SHIPPED | OUTPATIENT
Start: 2025-08-08

## 2025-08-20 RX ORDER — PANTOPRAZOLE SODIUM 40 MG/1
40 TABLET, DELAYED RELEASE ORAL EVERY MORNING
Qty: 90 TABLET | Refills: 0 | Status: SHIPPED | OUTPATIENT
Start: 2025-08-20

## (undated) DEVICE — MSK ENDO PORT O2 POM ELITE CURAPLEX A/

## (undated) DEVICE — SENSR O2 OXIMAX FNGR A/ 18IN NONSTR

## (undated) DEVICE — LN SMPL CO2 SHTRM SD STREAM W/M LUER

## (undated) DEVICE — BLCK/BITE BLOX W/DENTL/RIM W/STRAP 54F

## (undated) DEVICE — KT ORCA ORCAPOD DISP STRL

## (undated) DEVICE — TUBING, SUCTION, 1/4" X 10', STRAIGHT: Brand: MEDLINE

## (undated) DEVICE — ADAPT CLN BIOGUARD AIR/H2O DISP

## (undated) DEVICE — FRCP BX RADJAW4 NDL 2.8 240CM LG OG BX40